# Patient Record
Sex: FEMALE | Race: WHITE | NOT HISPANIC OR LATINO | Employment: OTHER | URBAN - METROPOLITAN AREA
[De-identification: names, ages, dates, MRNs, and addresses within clinical notes are randomized per-mention and may not be internally consistent; named-entity substitution may affect disease eponyms.]

---

## 2017-01-15 RX ORDER — AZATHIOPRINE 50 MG/1
50 TABLET ORAL DAILY
Qty: 30 TABLET | Refills: 0 | Status: SHIPPED | OUTPATIENT
Start: 2017-01-15 | End: 2017-03-03 | Stop reason: SDUPTHER

## 2017-03-06 RX ORDER — AZATHIOPRINE 50 MG/1
TABLET ORAL
Qty: 30 TABLET | Refills: 0 | Status: SHIPPED | OUTPATIENT
Start: 2017-03-06 | End: 2017-04-24 | Stop reason: SDUPTHER

## 2017-04-24 RX ORDER — AZATHIOPRINE 50 MG/1
TABLET ORAL
Qty: 30 TABLET | Refills: 6 | Status: SHIPPED | OUTPATIENT
Start: 2017-04-24 | End: 2017-10-10 | Stop reason: SDUPTHER

## 2017-10-11 RX ORDER — AZATHIOPRINE 50 MG/1
TABLET ORAL
Qty: 30 TABLET | Refills: 0 | Status: SHIPPED | OUTPATIENT
Start: 2017-10-11 | End: 2018-02-06 | Stop reason: SDUPTHER

## 2017-10-20 ENCOUNTER — TELEPHONE (OUTPATIENT)
Dept: HEPATOLOGY | Facility: CLINIC | Age: 57
End: 2017-10-20

## 2017-10-20 NOTE — TELEPHONE ENCOUNTER
----- Message from Agatha Maharaj sent at 10/20/2017  3:44 PM CDT -----  Contact: PT  Please call patient regarding appt on 11-6-17, states labs and u/s need to be scheduled before appt.     Call

## 2017-10-20 NOTE — TELEPHONE ENCOUNTER
MA called patient back, she is former patient of ZELDA CARMICHAEL. She is scheduled to see Dr. Hankins. She is requesting to get labs and ultrasound done before she see Dr. Hankins.     Inform patient that we are going to ask for order once its put it we are going to call her back to schedule the test. Patient understood. YOSEPH

## 2017-10-23 DIAGNOSIS — K75.4 AUTOIMMUNE HEPATITIS: Primary | ICD-10-CM

## 2017-10-23 DIAGNOSIS — K74.60 CIRRHOSIS OF LIVER WITHOUT ASCITES, UNSPECIFIED HEPATIC CIRRHOSIS TYPE: ICD-10-CM

## 2017-11-06 ENCOUNTER — HOSPITAL ENCOUNTER (OUTPATIENT)
Dept: RADIOLOGY | Facility: HOSPITAL | Age: 57
Discharge: HOME | End: 2017-11-06
Attending: INTERNAL MEDICINE
Payer: MEDICARE

## 2017-11-06 ENCOUNTER — OFFICE VISIT (OUTPATIENT)
Dept: HEPATOLOGY | Facility: CLINIC | Age: 57
End: 2017-11-06
Payer: MEDICARE

## 2017-11-06 VITALS
DIASTOLIC BLOOD PRESSURE: 57 MMHG | TEMPERATURE: 98 F | HEART RATE: 83 BPM | HEIGHT: 65 IN | BODY MASS INDEX: 48.82 KG/M2 | OXYGEN SATURATION: 98 % | SYSTOLIC BLOOD PRESSURE: 119 MMHG | WEIGHT: 293 LBS | RESPIRATION RATE: 18 BRPM

## 2017-11-06 DIAGNOSIS — I85.11 SECONDARY ESOPHAGEAL VARICES WITH BLEEDING: ICD-10-CM

## 2017-11-06 DIAGNOSIS — K74.60 CIRRHOSIS OF LIVER WITHOUT ASCITES, UNSPECIFIED HEPATIC CIRRHOSIS TYPE: ICD-10-CM

## 2017-11-06 DIAGNOSIS — Z95.828 S/P TIPS (TRANSJUGULAR INTRAHEPATIC PORTOSYSTEMIC SHUNT): ICD-10-CM

## 2017-11-06 DIAGNOSIS — K75.4 AUTOIMMUNE HEPATITIS: ICD-10-CM

## 2017-11-06 DIAGNOSIS — K21.9 GASTROESOPHAGEAL REFLUX DISEASE WITHOUT ESOPHAGITIS: Primary | ICD-10-CM

## 2017-11-06 DIAGNOSIS — K74.69 OTHER CIRRHOSIS OF LIVER: ICD-10-CM

## 2017-11-06 PROCEDURE — 76700 US EXAM ABDOM COMPLETE: CPT | Mod: 26,GC,, | Performed by: RADIOLOGY

## 2017-11-06 PROCEDURE — 99215 OFFICE O/P EST HI 40 MIN: CPT | Mod: S$PBB,,, | Performed by: INTERNAL MEDICINE

## 2017-11-06 PROCEDURE — 76700 US EXAM ABDOM COMPLETE: CPT | Mod: TC

## 2017-11-06 PROCEDURE — 99214 OFFICE O/P EST MOD 30 MIN: CPT | Mod: PBBFAC,25 | Performed by: INTERNAL MEDICINE

## 2017-11-06 PROCEDURE — 99999 PR PBB SHADOW E&M-EST. PATIENT-LVL IV: CPT | Mod: PBBFAC,,, | Performed by: INTERNAL MEDICINE

## 2017-11-06 RX ORDER — PANTOPRAZOLE SODIUM 40 MG/1
40 TABLET, DELAYED RELEASE ORAL DAILY
Qty: 90 TABLET | Refills: 11 | Status: SHIPPED | OUTPATIENT
Start: 2017-11-06 | End: 2018-01-01 | Stop reason: SDUPTHER

## 2017-11-06 NOTE — PATIENT INSTRUCTIONS
1. Continue imuran at the current dose  2. Blood tests every 3 motnhs  3. Repeat US but with doppler in 6 months  4. Return in 4 months  5. Return sooner if having a lot of infections

## 2017-11-06 NOTE — LETTER
November 6, 2017      Iman Katz NP  [address]  [place] [address]           [facility] - Hepatology  [address]  [facility] [address]  Phone: [phone]  Fax: [phone]          Patient: Zaira Gupta   MR Number: [id number]   YOB: 1960   Date of Visit: 11/6/2017       Dear Iman Katz:    Thank you for referring Zaira Gupta to me for evaluation. Attached you will find relevant portions of my assessment and plan of care.    If you have questions, please do not hesitate to call me. I look forward to following Zaira Gupta along with you.    Sincerely,    Gricelda Hankins MD    Enclosure  CC:  No Recipients    If you would like to receive this communication electronically, please contact [email or web address] or [phone] to request more information on [employer] Link access.    For providers and/or their staff who would like to refer a patient to [facility], please contact us through our one-stop-shop provider referral line, [facility], at [phone].    If you feel you have received this communication in error or would no longer like to receive these types of communications, please e-mail [email or web address]

## 2017-11-06 NOTE — PROGRESS NOTES
HEPATOLOGY FOLLOW UP    Referring Physician: Minna Canchola NP  Current Corresponding Physician: Minna Canchola NP    Zaira Gupta is here for follow up of Autoimmune hepatitis      HPI   Zaira Gupta has a history of decompensated cirrhosis from autoimmune hepatitis and possibly NORIEGA. She had a gastric variceal bleed in February of 2012 and was treated at [facility] with a TIPS procedure and a subsequent revision of the shunt there. Ascites is controlled with TIPS/diuretics and salt restriction.    Her liver biopsy 04/12 done elsewhere was reviewed at [facility] and although there was no definitive evidence of autoimmune hepatitis, there was a lymphocytic infiltrate and her BRANT was strongly positive with a titer of 1 in 320. She has been on azathioprine 50 mg daily with improvement in her liver enzymes and it was recommended that she continue this medication.    US today- no mass lesions; 0.8 cm ?foregin body? TIPS not evaluated     She is overall feeling well. She has no N/V, abdo pain, constipation or diarrhea. She has had no recurrent infections. Her BMI is 53.71.    ALT is 27, AST 47, Tbil 2.1, ALKP 116, INR 1.1, plts 79, creat 1.3 and tbil 2.1, Na 138.    Outpatient Encounter Prescriptions as of 11/6/2017   Medication Sig Dispense Refill    azathioprine (IMURAN) 50 mg Tab TAKE 1 TABLET(50 MG) BY MOUTH EVERY DAY 30 tablet 0    betamethasone dipropionate (DIPROLENE) 0.05 % cream Apply topically 2 (two) times daily. 45 g 1    CHOLECALCIFEROL, VITAMIN D3, (VITAMIN D3 ORAL) Take 1,000 mg by mouth once daily.      furosemide (LASIX) 40 MG tablet Take 1 tablet (40 mg total) by mouth once daily. 30 tablet 11    levothyroxine (SYNTHROID) 125 MCG tablet Take 2 tablets (250 mcg total) by mouth before breakfast. 180 tablet 3    pantoprazole (PROTONIX) 40 MG tablet TAKE 1 TABLET BY MOUTH ONCE DAILY 90 tablet 0    rifAXIMin (XIFAXAN) 550 mg Tab Take 1 tablet (550 mg total) by mouth 2 (two) times daily. 60 tablet 6     spironolactone (ALDACTONE) 100 MG tablet Take 1 tablet (100 mg total) by mouth once daily. 30 tablet 11    clindamycin phosphate 1% (CLINDAGEL) 1 % gel Apply topically 2 (two) times daily. Apply to affected areas. (Patient taking differently: Apply topically 2 (two) times daily as needed. Apply to affected areas.) 60 g 3     No facility-administered encounter medications on file as of 11/6/2017.      Review of patient's allergies indicates:   Allergen Reactions    Aspirin (bulk) Other (See Comments)    Heparin analogues Other (See Comments)     Difficulty to arouse    Nsaids (non-steroidal anti-inflammatory drug)      Past Medical History:   Diagnosis Date    Acid reflux     Anemia     Arthritis     Autoimmune hepatitis 10/23/2012      Ref. Range 2/6/2012 09:38  BRANT Latest Range: Neg <1:160  Pos, Titer to follow (A)  BRANT HEP-2 Titer Latest Range: Neg <1:160 titer Pos 1:320 (A)  BRANT Hep-2 Pattern No range found Homogeneous (A)  Anti-SSA Antibody Latest Range: <20 EU 1.68  Anti-SSA Interpretation Latest Range: Negative  Negative  Anti-SSB Antibody Latest Range: <20 EU 1.76  Anti-SSB Interpretation Latest Range: Negative  Negativ    Cirrhosis     Dry mouth     Esophageal varix bleeding     Hypothyroidism     Obesity     Thrombocytopenia        Review of Systems   Constitutional: Negative.    HENT: Negative.    Eyes: Negative.    Respiratory: Negative.    Cardiovascular: Negative.    Gastrointestinal: Negative.    Genitourinary: Negative.    Musculoskeletal: Negative.    Skin: Negative.    Neurological: Negative.    Psychiatric/Behavioral: Negative.      Vitals:    11/06/17 1311   BP: (!) 119/57   Pulse: 83   Resp: 18   Temp: 97.6 °F (36.4 °C)       Physical Exam   Constitutional: She is oriented to person, place, and time. She appears well-developed and well-nourished.   HENT:   Head: Normocephalic and atraumatic.   Eyes: Conjunctivae and EOM are normal. Pupils are equal, round, and reactive to light.  No scleral icterus.   Neck: Normal range of motion. Neck supple. No thyromegaly present.   Cardiovascular: Normal rate, regular rhythm and normal heart sounds.    Pulmonary/Chest: Effort normal and breath sounds normal. She has no rales.   Abdominal: Soft. Bowel sounds are normal. She exhibits no distension and no mass. There is no tenderness.   Musculoskeletal: Normal range of motion. She exhibits no edema.   Neurological: She is alert and oriented to person, place, and time.   Skin: Skin is warm and dry. No rash noted.   Psychiatric: She has a normal mood and affect.   Vitals reviewed.      MELD-Na score: 13 at 11/6/2017 10:14 AM  MELD score: 13 at 11/6/2017 10:14 AM  Calculated from:  Serum Creatinine: 1.3 mg/dL at 11/6/2017 10:14 AM  Serum Sodium: 138 mmol/L (Rounded to 137) at 11/6/2017 10:14 AM  Total Bilirubin: 2.1 mg/dL at 11/6/2017 10:14 AM  INR(ratio): 1.1 at 11/6/2017 10:14 AM  Age: 57 years    Lab Results   Component Value Date    GLU 98 11/06/2017    BUN 15 11/06/2017    CREATININE 1.3 11/06/2017    CALCIUM 9.6 11/06/2017     11/06/2017    K 4.5 11/06/2017     11/06/2017    PROT 7.0 11/06/2017    CO2 29 11/06/2017    ANIONGAP 5 (L) 11/06/2017    WBC 2.48 (L) 11/06/2017    RBC 4.11 11/06/2017    HGB 13.3 11/06/2017    HCT 39.4 11/06/2017    MCV 96 11/06/2017    MCH 32.4 (H) 11/06/2017    MCHC 33.8 11/06/2017     Lab Results   Component Value Date    RDW 15.6 (H) 11/06/2017    PLT 79 (L) 11/06/2017    MPV 10.1 11/06/2017    GRAN 1.7 (L) 11/06/2017    GRAN 67.3 11/06/2017    LYMPH 0.5 (L) 11/06/2017    LYMPH 19.4 11/06/2017    MONO 0.2 (L) 11/06/2017    MONO 7.7 11/06/2017    EOSINOPHIL 3.6 11/06/2017    BASOPHIL 1.6 11/06/2017    EOS 0.1 11/06/2017    BASO 0.04 11/06/2017    APTT 34.1 (H) 08/25/2014    GROUPTRH A POS 11/15/2013    GROUPTRH A POS 02/06/2012    BRANT Pos, Titer to follow (A) 02/06/2012    CHOL 218 (H) 04/07/2015    TRIG 128 04/07/2015    HDL 31 (L) 04/07/2015    CHOLHDL 14.2 (L)  04/07/2015    TOTALCHOLEST 7.0 (H) 04/07/2015    ALBUMIN 3.0 (L) 11/06/2017    BILIDIR 0.6 (H) 10/03/2013    AST 47 (H) 11/06/2017    ALT 27 11/06/2017    ALKPHOS 116 11/06/2017    LABPROT 11.4 11/06/2017    INR 1.1 11/06/2017     MELD-Na score: 13 at 11/6/2017 10:14 AM  MELD score: 13 at 11/6/2017 10:14 AM  Calculated from:  Serum Creatinine: 1.3 mg/dL at 11/6/2017 10:14 AM  Serum Sodium: 138 mmol/L (Rounded to 137) at 11/6/2017 10:14 AM  Total Bilirubin: 2.1 mg/dL at 11/6/2017 10:14 AM  INR(ratio): 1.1 at 11/6/2017 10:14 AM  Age: 57 years    Assessment and Plan:    Zaira Gupta is a 57 y.o. female with cirrhosis from presumed Autoimmune hepatitis  My current recommendations:  1. Decompensated cirrhosis: MELD <15. Monitor meld labs every 3 months. I suspect that although autoimmune hepatitis was suspected, there is likely also a component of NORIEGA since her AST>ALT. Pt should have repeat HCC screening in 6 months (May 2018).  2. AScites, s/p TIPS; continue current diuretics; check tips patency with next US  3. Gastric varices: s/p TIPS; monitor  4. Autoimmune hepatitis: continue imuran; if recurrent infections develop then consider stopping imuran.  Return 4 months

## 2018-01-01 ENCOUNTER — PATIENT MESSAGE (OUTPATIENT)
Dept: HEPATOLOGY | Facility: CLINIC | Age: 58
End: 2018-01-01

## 2018-01-01 DIAGNOSIS — K21.9 GASTROESOPHAGEAL REFLUX DISEASE WITHOUT ESOPHAGITIS: ICD-10-CM

## 2018-01-01 RX ORDER — PANTOPRAZOLE SODIUM 40 MG/1
TABLET, DELAYED RELEASE ORAL
Qty: 90 TABLET | Refills: 0 | Status: SHIPPED | OUTPATIENT
Start: 2018-01-01 | End: 2019-01-01 | Stop reason: SDUPTHER

## 2018-01-01 RX ORDER — AZATHIOPRINE 50 MG/1
TABLET ORAL
Qty: 30 TABLET | Refills: 0 | Status: SHIPPED | OUTPATIENT
Start: 2018-01-01 | End: 2018-01-01 | Stop reason: SDUPTHER

## 2018-01-01 RX ORDER — RIFAXIMIN 550 MG/1
TABLET ORAL
Qty: 60 TABLET | Refills: 0 | Status: SHIPPED | OUTPATIENT
Start: 2018-01-01 | End: 2019-01-01 | Stop reason: SDUPTHER

## 2018-01-01 RX ORDER — AZATHIOPRINE 50 MG/1
TABLET ORAL
Qty: 30 TABLET | Refills: 0 | Status: SHIPPED | OUTPATIENT
Start: 2018-01-01 | End: 2019-01-01 | Stop reason: SDUPTHER

## 2018-01-01 RX ORDER — RIFAXIMIN 550 MG/1
TABLET ORAL
Qty: 60 TABLET | Refills: 0 | Status: SHIPPED | OUTPATIENT
Start: 2018-01-01 | End: 2018-01-01 | Stop reason: SDUPTHER

## 2018-01-23 RX ORDER — RIFAXIMIN 550 MG/1
TABLET ORAL
Qty: 60 TABLET | Refills: 0 | Status: SHIPPED | OUTPATIENT
Start: 2018-01-23 | End: 2018-02-28 | Stop reason: SDUPTHER

## 2018-02-06 RX ORDER — AZATHIOPRINE 50 MG/1
TABLET ORAL
Qty: 30 TABLET | Refills: 0 | Status: SHIPPED | OUTPATIENT
Start: 2018-02-06 | End: 2018-03-04 | Stop reason: SDUPTHER

## 2018-02-07 NOTE — TELEPHONE ENCOUNTER
----- Message from Gricelda Hankins MD sent at 2/6/2018  8:28 PM CST -----  The Labs are stable - please let patient know.

## 2018-02-28 RX ORDER — RIFAXIMIN 550 MG/1
TABLET ORAL
Qty: 60 TABLET | Refills: 0 | Status: SHIPPED | OUTPATIENT
Start: 2018-02-28 | End: 2018-04-03 | Stop reason: SDUPTHER

## 2018-03-04 RX ORDER — AZATHIOPRINE 50 MG/1
TABLET ORAL
Qty: 30 TABLET | Refills: 0 | Status: SHIPPED | OUTPATIENT
Start: 2018-03-04 | End: 2018-04-07 | Stop reason: SDUPTHER

## 2018-04-03 RX ORDER — RIFAXIMIN 550 MG/1
TABLET ORAL
Qty: 60 TABLET | Refills: 0 | Status: SHIPPED | OUTPATIENT
Start: 2018-04-03 | End: 2018-04-07 | Stop reason: SDUPTHER

## 2018-04-08 RX ORDER — RIFAXIMIN 550 MG/1
TABLET ORAL
Qty: 60 TABLET | Refills: 0 | Status: SHIPPED | OUTPATIENT
Start: 2018-04-08 | End: 2018-04-11 | Stop reason: SDUPTHER

## 2018-04-08 RX ORDER — AZATHIOPRINE 50 MG/1
TABLET ORAL
Qty: 30 TABLET | Refills: 0 | Status: SHIPPED | OUTPATIENT
Start: 2018-04-08 | End: 2018-06-10 | Stop reason: SDUPTHER

## 2018-05-23 PROBLEM — K05.6 PERIODONTAL DISEASE: Status: ACTIVE | Noted: 2018-05-23

## 2018-05-23 PROBLEM — K02.9 DENTAL CARIES: Status: ACTIVE | Noted: 2018-05-23

## 2018-05-23 PROBLEM — K09.0 ODONTOGENIC CYST: Status: ACTIVE | Noted: 2018-05-23

## 2018-06-10 RX ORDER — AZATHIOPRINE 50 MG/1
TABLET ORAL
Qty: 30 TABLET | Refills: 0 | Status: SHIPPED | OUTPATIENT
Start: 2018-06-10 | End: 2018-07-24 | Stop reason: SDUPTHER

## 2018-07-24 RX ORDER — AZATHIOPRINE 50 MG/1
TABLET ORAL
Qty: 30 TABLET | Refills: 0 | Status: SHIPPED | OUTPATIENT
Start: 2018-07-24 | End: 2018-08-24 | Stop reason: SDUPTHER

## 2018-07-24 RX ORDER — RIFAXIMIN 550 MG/1
TABLET ORAL
Qty: 60 TABLET | Refills: 0 | Status: SHIPPED | OUTPATIENT
Start: 2018-07-24 | End: 2018-07-30 | Stop reason: SDUPTHER

## 2018-08-07 ENCOUNTER — TELEPHONE (OUTPATIENT)
Dept: HEPATOLOGY | Facility: CLINIC | Age: 58
End: 2018-08-07

## 2018-08-07 NOTE — TELEPHONE ENCOUNTER
----- Message from Gricelda Hankins MD sent at 8/7/2018  7:33 AM CDT -----  The Labs are stable - please let patient know.

## 2018-08-24 RX ORDER — RIFAXIMIN 550 MG/1
TABLET ORAL
Qty: 60 TABLET | Refills: 0 | Status: SHIPPED | OUTPATIENT
Start: 2018-08-24 | End: 2018-09-26 | Stop reason: SDUPTHER

## 2018-08-24 RX ORDER — AZATHIOPRINE 50 MG/1
TABLET ORAL
Qty: 30 TABLET | Refills: 0 | Status: SHIPPED | OUTPATIENT
Start: 2018-08-24 | End: 2018-09-26 | Stop reason: SDUPTHER

## 2018-09-26 RX ORDER — RIFAXIMIN 550 MG/1
TABLET ORAL
Qty: 60 TABLET | Refills: 0 | Status: SHIPPED | OUTPATIENT
Start: 2018-09-26 | End: 2018-01-01 | Stop reason: SDUPTHER

## 2018-09-26 RX ORDER — AZATHIOPRINE 50 MG/1
TABLET ORAL
Qty: 30 TABLET | Refills: 0 | Status: SHIPPED | OUTPATIENT
Start: 2018-09-26 | End: 2018-01-01 | Stop reason: SDUPTHER

## 2019-01-01 ENCOUNTER — TELEPHONE (OUTPATIENT)
Dept: HEPATOLOGY | Facility: CLINIC | Age: 59
End: 2019-01-01

## 2019-01-01 ENCOUNTER — HOSPITAL ENCOUNTER (INPATIENT)
Facility: HOSPITAL | Age: 59
LOS: 2 days | DRG: 951 | End: 2019-09-29
Attending: INTERNAL MEDICINE | Admitting: HOSPITALIST
Payer: OTHER MISCELLANEOUS

## 2019-01-01 ENCOUNTER — ANESTHESIA EVENT (OUTPATIENT)
Dept: SURGERY | Facility: HOSPITAL | Age: 59
DRG: 820 | End: 2019-01-01
Payer: MEDICARE

## 2019-01-01 ENCOUNTER — ANESTHESIA (OUTPATIENT)
Dept: SURGERY | Facility: HOSPITAL | Age: 59
DRG: 820 | End: 2019-01-01
Payer: MEDICARE

## 2019-01-01 ENCOUNTER — HOSPITAL ENCOUNTER (INPATIENT)
Facility: HOSPITAL | Age: 59
LOS: 14 days | Discharge: HOSPICE | DRG: 820 | End: 2019-09-27
Attending: SURGERY | Admitting: SURGERY
Payer: MEDICARE

## 2019-01-01 ENCOUNTER — ANESTHESIA EVENT (OUTPATIENT)
Dept: ENDOSCOPY | Facility: HOSPITAL | Age: 59
DRG: 820 | End: 2019-01-01
Payer: MEDICARE

## 2019-01-01 ENCOUNTER — ANESTHESIA (OUTPATIENT)
Dept: ENDOSCOPY | Facility: HOSPITAL | Age: 59
DRG: 820 | End: 2019-01-01
Payer: MEDICARE

## 2019-01-01 VITALS
OXYGEN SATURATION: 100 % | WEIGHT: 293 LBS | HEART RATE: 105 BPM | HEIGHT: 65 IN | BODY MASS INDEX: 48.82 KG/M2 | DIASTOLIC BLOOD PRESSURE: 57 MMHG | SYSTOLIC BLOOD PRESSURE: 116 MMHG | TEMPERATURE: 97 F | RESPIRATION RATE: 20 BRPM

## 2019-01-01 VITALS
TEMPERATURE: 96 F | OXYGEN SATURATION: 98 % | DIASTOLIC BLOOD PRESSURE: 40 MMHG | RESPIRATION RATE: 16 BRPM | HEART RATE: 98 BPM | SYSTOLIC BLOOD PRESSURE: 92 MMHG

## 2019-01-01 DIAGNOSIS — K76.82 HEPATIC ENCEPHALOPATHY: ICD-10-CM

## 2019-01-01 DIAGNOSIS — K75.4 AUTOIMMUNE HEPATITIS: ICD-10-CM

## 2019-01-01 DIAGNOSIS — K63.1 PERFORATION OF INTESTINE: Primary | ICD-10-CM

## 2019-01-01 DIAGNOSIS — Z71.89 GOALS OF CARE, COUNSELING/DISCUSSION: ICD-10-CM

## 2019-01-01 DIAGNOSIS — K63.1 SMALL BOWEL PERFORATION: ICD-10-CM

## 2019-01-01 DIAGNOSIS — K21.9 GASTROESOPHAGEAL REFLUX DISEASE WITHOUT ESOPHAGITIS: ICD-10-CM

## 2019-01-01 DIAGNOSIS — N18.30 CKD (CHRONIC KIDNEY DISEASE), STAGE III: ICD-10-CM

## 2019-01-01 DIAGNOSIS — C83.30 DIFFUSE LARGE B CELL LYMPHOMA: ICD-10-CM

## 2019-01-01 DIAGNOSIS — K75.4 AUTOIMMUNE HEPATITIS: Primary | ICD-10-CM

## 2019-01-01 DIAGNOSIS — Z71.89 ADVANCED CARE PLANNING/COUNSELING DISCUSSION: ICD-10-CM

## 2019-01-01 DIAGNOSIS — K92.2 ACUTE GI BLEEDING: ICD-10-CM

## 2019-01-01 DIAGNOSIS — K25.5 GASTRIC PERFORATION: ICD-10-CM

## 2019-01-01 DIAGNOSIS — Z51.5 PALLIATIVE CARE ENCOUNTER: ICD-10-CM

## 2019-01-01 DIAGNOSIS — K76.82 HEPATIC ENCEPHALOPATHY: Primary | ICD-10-CM

## 2019-01-01 DIAGNOSIS — F05 ACUTE CONFUSIONAL STATE: ICD-10-CM

## 2019-01-01 LAB
ABO + RH BLD: NORMAL
ABO + RH BLD: NORMAL
ALBUMIN SERPL BCP-MCNC: 1.4 G/DL (ref 3.5–5.2)
ALBUMIN SERPL BCP-MCNC: 1.5 G/DL (ref 3.5–5.2)
ALBUMIN SERPL BCP-MCNC: 1.6 G/DL (ref 3.5–5.2)
ALBUMIN SERPL BCP-MCNC: 1.7 G/DL (ref 3.5–5.2)
ALBUMIN SERPL BCP-MCNC: 1.8 G/DL (ref 3.5–5.2)
ALBUMIN SERPL BCP-MCNC: 2 G/DL (ref 3.5–5.2)
ALBUMIN SERPL BCP-MCNC: 2.1 G/DL (ref 3.5–5.2)
ALLENS TEST: ABNORMAL
ALLENS TEST: NORMAL
ALLENS TEST: NORMAL
ALP SERPL-CCNC: 105 U/L (ref 55–135)
ALP SERPL-CCNC: 77 U/L (ref 55–135)
ALP SERPL-CCNC: 78 U/L (ref 55–135)
ALP SERPL-CCNC: 80 U/L (ref 55–135)
ALP SERPL-CCNC: 81 U/L (ref 55–135)
ALP SERPL-CCNC: 82 U/L (ref 55–135)
ALP SERPL-CCNC: 82 U/L (ref 55–135)
ALP SERPL-CCNC: 83 U/L (ref 55–135)
ALP SERPL-CCNC: 88 U/L (ref 55–135)
ALP SERPL-CCNC: 89 U/L (ref 55–135)
ALP SERPL-CCNC: 90 U/L (ref 55–135)
ALP SERPL-CCNC: 91 U/L (ref 55–135)
ALP SERPL-CCNC: 92 U/L (ref 55–135)
ALP SERPL-CCNC: 92 U/L (ref 55–135)
ALP SERPL-CCNC: 94 U/L (ref 55–135)
ALP SERPL-CCNC: 95 U/L (ref 55–135)
ALT SERPL W/O P-5'-P-CCNC: 19 U/L (ref 10–44)
ALT SERPL W/O P-5'-P-CCNC: 21 U/L (ref 10–44)
ALT SERPL W/O P-5'-P-CCNC: 22 U/L (ref 10–44)
ALT SERPL W/O P-5'-P-CCNC: 24 U/L (ref 10–44)
ALT SERPL W/O P-5'-P-CCNC: 29 U/L (ref 10–44)
ALT SERPL W/O P-5'-P-CCNC: 30 U/L (ref 10–44)
ALT SERPL W/O P-5'-P-CCNC: 31 U/L (ref 10–44)
ALT SERPL W/O P-5'-P-CCNC: 32 U/L (ref 10–44)
ALT SERPL W/O P-5'-P-CCNC: 33 U/L (ref 10–44)
ALT SERPL W/O P-5'-P-CCNC: 34 U/L (ref 10–44)
ALT SERPL W/O P-5'-P-CCNC: 35 U/L (ref 10–44)
ALT SERPL W/O P-5'-P-CCNC: 36 U/L (ref 10–44)
ALT SERPL W/O P-5'-P-CCNC: 37 U/L (ref 10–44)
ALT SERPL W/O P-5'-P-CCNC: 39 U/L (ref 10–44)
ALT SERPL W/O P-5'-P-CCNC: 41 U/L (ref 10–44)
ALT SERPL W/O P-5'-P-CCNC: 41 U/L (ref 10–44)
AMMONIA PLAS-SCNC: 117 UMOL/L (ref 10–50)
AMMONIA PLAS-SCNC: 124 UMOL/L (ref 10–50)
AMMONIA PLAS-SCNC: 40 UMOL/L (ref 10–50)
AMMONIA PLAS-SCNC: 40 UMOL/L (ref 10–50)
AMMONIA PLAS-SCNC: 45 UMOL/L (ref 10–50)
AMMONIA PLAS-SCNC: 53 UMOL/L (ref 10–50)
AMMONIA PLAS-SCNC: 67 UMOL/L (ref 10–50)
AMMONIA PLAS-SCNC: 70 UMOL/L (ref 10–50)
AMMONIA PLAS-SCNC: 72 UMOL/L (ref 10–50)
AMMONIA PLAS-SCNC: 74 UMOL/L (ref 10–50)
AMMONIA PLAS-SCNC: 76 UMOL/L (ref 10–50)
AMMONIA PLAS-SCNC: 90 UMOL/L (ref 10–50)
AMMONIA PLAS-SCNC: 99 UMOL/L (ref 10–50)
ANION GAP SERPL CALC-SCNC: 11 MMOL/L (ref 8–16)
ANION GAP SERPL CALC-SCNC: 12 MMOL/L (ref 8–16)
ANION GAP SERPL CALC-SCNC: 13 MMOL/L (ref 8–16)
ANION GAP SERPL CALC-SCNC: 14 MMOL/L (ref 8–16)
ANION GAP SERPL CALC-SCNC: 4 MMOL/L (ref 8–16)
ANION GAP SERPL CALC-SCNC: 5 MMOL/L (ref 8–16)
ANION GAP SERPL CALC-SCNC: 6 MMOL/L (ref 8–16)
ANION GAP SERPL CALC-SCNC: 7 MMOL/L (ref 8–16)
ANION GAP SERPL CALC-SCNC: 8 MMOL/L (ref 8–16)
ANION GAP SERPL CALC-SCNC: 8 MMOL/L (ref 8–16)
ANION GAP SERPL CALC-SCNC: 9 MMOL/L (ref 8–16)
ANISOCYTOSIS BLD QL SMEAR: ABNORMAL
ANISOCYTOSIS BLD QL SMEAR: SLIGHT
APTT BLDCRRT: 37 SEC (ref 21–32)
APTT BLDCRRT: 37.8 SEC (ref 21–32)
APTT BLDCRRT: 39.1 SEC (ref 21–32)
APTT BLDCRRT: 40.4 SEC (ref 21–32)
APTT BLDCRRT: 40.7 SEC (ref 21–32)
APTT BLDCRRT: 41.2 SEC (ref 21–32)
APTT BLDCRRT: 41.7 SEC (ref 21–32)
APTT BLDCRRT: 41.8 SEC (ref 21–32)
APTT BLDCRRT: 42.1 SEC (ref 21–32)
APTT BLDCRRT: 43.3 SEC (ref 21–32)
APTT BLDCRRT: 43.5 SEC (ref 21–32)
APTT BLDCRRT: 43.7 SEC (ref 21–32)
APTT BLDCRRT: 46.3 SEC (ref 21–32)
APTT BLDCRRT: 48 SEC (ref 21–32)
APTT BLDCRRT: 53.6 SEC (ref 21–32)
AST SERPL-CCNC: 100 U/L (ref 10–40)
AST SERPL-CCNC: 100 U/L (ref 10–40)
AST SERPL-CCNC: 111 U/L (ref 10–40)
AST SERPL-CCNC: 111 U/L (ref 10–40)
AST SERPL-CCNC: 113 U/L (ref 10–40)
AST SERPL-CCNC: 45 U/L (ref 10–40)
AST SERPL-CCNC: 51 U/L (ref 10–40)
AST SERPL-CCNC: 61 U/L (ref 10–40)
AST SERPL-CCNC: 61 U/L (ref 10–40)
AST SERPL-CCNC: 70 U/L (ref 10–40)
AST SERPL-CCNC: 77 U/L (ref 10–40)
AST SERPL-CCNC: 78 U/L (ref 10–40)
AST SERPL-CCNC: 79 U/L (ref 10–40)
AST SERPL-CCNC: 81 U/L (ref 10–40)
AST SERPL-CCNC: 85 U/L (ref 10–40)
AST SERPL-CCNC: 85 U/L (ref 10–40)
AST SERPL-CCNC: 90 U/L (ref 10–40)
AST SERPL-CCNC: 95 U/L (ref 10–40)
BASO STIPL BLD QL SMEAR: ABNORMAL
BASOPHILS # BLD AUTO: 0 K/UL (ref 0–0.2)
BASOPHILS # BLD AUTO: 0.01 K/UL (ref 0–0.2)
BASOPHILS # BLD AUTO: 0.02 K/UL (ref 0–0.2)
BASOPHILS # BLD AUTO: 0.02 K/UL (ref 0–0.2)
BASOPHILS NFR BLD: 0 % (ref 0–1.9)
BASOPHILS NFR BLD: 0.1 % (ref 0–1.9)
BASOPHILS NFR BLD: 0.2 % (ref 0–1.9)
BASOPHILS NFR BLD: 0.3 % (ref 0–1.9)
BASOPHILS NFR BLD: 0.5 % (ref 0–1.9)
BILIRUB SERPL-MCNC: 3.9 MG/DL (ref 0.1–1)
BILIRUB SERPL-MCNC: 3.9 MG/DL (ref 0.1–1)
BILIRUB SERPL-MCNC: 4.1 MG/DL (ref 0.1–1)
BILIRUB SERPL-MCNC: 4.2 MG/DL (ref 0.1–1)
BILIRUB SERPL-MCNC: 4.3 MG/DL (ref 0.1–1)
BILIRUB SERPL-MCNC: 4.5 MG/DL (ref 0.1–1)
BILIRUB SERPL-MCNC: 4.8 MG/DL (ref 0.1–1)
BILIRUB SERPL-MCNC: 4.9 MG/DL (ref 0.1–1)
BILIRUB SERPL-MCNC: 5.1 MG/DL (ref 0.1–1)
BILIRUB SERPL-MCNC: 5.4 MG/DL (ref 0.1–1)
BILIRUB SERPL-MCNC: 5.5 MG/DL (ref 0.1–1)
BILIRUB SERPL-MCNC: 5.6 MG/DL (ref 0.1–1)
BILIRUB SERPL-MCNC: 5.8 MG/DL (ref 0.1–1)
BILIRUB SERPL-MCNC: 6 MG/DL (ref 0.1–1)
BILIRUB SERPL-MCNC: 6.6 MG/DL (ref 0.1–1)
BILIRUB SERPL-MCNC: 6.8 MG/DL (ref 0.1–1)
BLD GP AB SCN CELLS X3 SERPL QL: NORMAL
BLD GP AB SCN CELLS X3 SERPL QL: NORMAL
BLD PROD TYP BPU: NORMAL
BLOOD UNIT EXPIRATION DATE: NORMAL
BLOOD UNIT TYPE CODE: 600
BLOOD UNIT TYPE CODE: 6200
BLOOD UNIT TYPE CODE: NORMAL
BLOOD UNIT TYPE CODE: NORMAL
BLOOD UNIT TYPE: NORMAL
BUN SERPL-MCNC: 22 MG/DL (ref 6–20)
BUN SERPL-MCNC: 23 MG/DL (ref 6–20)
BUN SERPL-MCNC: 27 MG/DL (ref 6–20)
BUN SERPL-MCNC: 38 MG/DL (ref 6–20)
BUN SERPL-MCNC: 42 MG/DL (ref 6–20)
BUN SERPL-MCNC: 44 MG/DL (ref 6–20)
BUN SERPL-MCNC: 44 MG/DL (ref 6–20)
BUN SERPL-MCNC: 45 MG/DL (ref 6–20)
BUN SERPL-MCNC: 46 MG/DL (ref 6–20)
BUN SERPL-MCNC: 48 MG/DL (ref 6–20)
BUN SERPL-MCNC: 48 MG/DL (ref 6–20)
BUN SERPL-MCNC: 50 MG/DL (ref 6–20)
BUN SERPL-MCNC: 50 MG/DL (ref 6–20)
BUN SERPL-MCNC: 51 MG/DL (ref 6–20)
BUN SERPL-MCNC: 52 MG/DL (ref 6–20)
BUN SERPL-MCNC: 53 MG/DL (ref 6–20)
BUN SERPL-MCNC: 62 MG/DL (ref 6–20)
BUN SERPL-MCNC: 81 MG/DL (ref 6–20)
BUN SERPL-MCNC: 84 MG/DL (ref 6–20)
BUN SERPL-MCNC: 87 MG/DL (ref 6–20)
BURR CELLS BLD QL SMEAR: ABNORMAL
BURR CELLS BLD QL SMEAR: ABNORMAL
CALCIUM SERPL-MCNC: 7.3 MG/DL (ref 8.7–10.5)
CALCIUM SERPL-MCNC: 7.4 MG/DL (ref 8.7–10.5)
CALCIUM SERPL-MCNC: 7.4 MG/DL (ref 8.7–10.5)
CALCIUM SERPL-MCNC: 7.6 MG/DL (ref 8.7–10.5)
CALCIUM SERPL-MCNC: 8 MG/DL (ref 8.7–10.5)
CALCIUM SERPL-MCNC: 8 MG/DL (ref 8.7–10.5)
CALCIUM SERPL-MCNC: 8.1 MG/DL (ref 8.7–10.5)
CALCIUM SERPL-MCNC: 8.2 MG/DL (ref 8.7–10.5)
CALCIUM SERPL-MCNC: 8.4 MG/DL (ref 8.7–10.5)
CALCIUM SERPL-MCNC: 8.4 MG/DL (ref 8.7–10.5)
CALCIUM SERPL-MCNC: 8.7 MG/DL (ref 8.7–10.5)
CALCIUM SERPL-MCNC: 8.9 MG/DL (ref 8.7–10.5)
CALCIUM SERPL-MCNC: 8.9 MG/DL (ref 8.7–10.5)
CALCIUM SERPL-MCNC: 9 MG/DL (ref 8.7–10.5)
CALCIUM SERPL-MCNC: 9.1 MG/DL (ref 8.7–10.5)
CALCIUM SERPL-MCNC: 9.4 MG/DL (ref 8.7–10.5)
CALCIUM SERPL-MCNC: 9.5 MG/DL (ref 8.7–10.5)
CALCIUM SERPL-MCNC: 9.7 MG/DL (ref 8.7–10.5)
CHLORIDE SERPL-SCNC: 108 MMOL/L (ref 95–110)
CHLORIDE SERPL-SCNC: 109 MMOL/L (ref 95–110)
CHLORIDE SERPL-SCNC: 109 MMOL/L (ref 95–110)
CHLORIDE SERPL-SCNC: 110 MMOL/L (ref 95–110)
CHLORIDE SERPL-SCNC: 110 MMOL/L (ref 95–110)
CHLORIDE SERPL-SCNC: 111 MMOL/L (ref 95–110)
CHLORIDE SERPL-SCNC: 111 MMOL/L (ref 95–110)
CHLORIDE SERPL-SCNC: 112 MMOL/L (ref 95–110)
CHLORIDE SERPL-SCNC: 113 MMOL/L (ref 95–110)
CHLORIDE SERPL-SCNC: 113 MMOL/L (ref 95–110)
CHLORIDE SERPL-SCNC: 114 MMOL/L (ref 95–110)
CHLORIDE SERPL-SCNC: 114 MMOL/L (ref 95–110)
CHLORIDE SERPL-SCNC: 115 MMOL/L (ref 95–110)
CHLORIDE SERPL-SCNC: 117 MMOL/L (ref 95–110)
CHLORIDE SERPL-SCNC: 118 MMOL/L (ref 95–110)
CO2 SERPL-SCNC: 14 MMOL/L (ref 23–29)
CO2 SERPL-SCNC: 15 MMOL/L (ref 23–29)
CO2 SERPL-SCNC: 16 MMOL/L (ref 23–29)
CO2 SERPL-SCNC: 17 MMOL/L (ref 23–29)
CO2 SERPL-SCNC: 18 MMOL/L (ref 23–29)
CO2 SERPL-SCNC: 20 MMOL/L (ref 23–29)
CO2 SERPL-SCNC: 21 MMOL/L (ref 23–29)
CO2 SERPL-SCNC: 23 MMOL/L (ref 23–29)
CO2 SERPL-SCNC: 23 MMOL/L (ref 23–29)
CO2 SERPL-SCNC: 24 MMOL/L (ref 23–29)
CO2 SERPL-SCNC: 24 MMOL/L (ref 23–29)
CO2 SERPL-SCNC: 25 MMOL/L (ref 23–29)
CO2 SERPL-SCNC: 26 MMOL/L (ref 23–29)
CO2 SERPL-SCNC: 27 MMOL/L (ref 23–29)
CO2 SERPL-SCNC: 27 MMOL/L (ref 23–29)
CODING SYSTEM: NORMAL
CREAT SERPL-MCNC: 0.9 MG/DL (ref 0.5–1.4)
CREAT SERPL-MCNC: 0.9 MG/DL (ref 0.5–1.4)
CREAT SERPL-MCNC: 1 MG/DL (ref 0.5–1.4)
CREAT SERPL-MCNC: 1.1 MG/DL (ref 0.5–1.4)
CREAT SERPL-MCNC: 1.2 MG/DL (ref 0.5–1.4)
CREAT SERPL-MCNC: 1.3 MG/DL (ref 0.5–1.4)
CREAT SERPL-MCNC: 1.4 MG/DL (ref 0.5–1.4)
CREAT SERPL-MCNC: 1.4 MG/DL (ref 0.5–1.4)
CREAT SERPL-MCNC: 1.5 MG/DL (ref 0.5–1.4)
CREAT SERPL-MCNC: 1.6 MG/DL (ref 0.5–1.4)
CREAT SERPL-MCNC: 1.7 MG/DL (ref 0.5–1.4)
CREAT SERPL-MCNC: 2 MG/DL (ref 0.5–1.4)
CREAT SERPL-MCNC: 2 MG/DL (ref 0.5–1.4)
CREAT SERPL-MCNC: 2.1 MG/DL (ref 0.5–1.4)
DELSYS: ABNORMAL
DELSYS: NORMAL
DELSYS: NORMAL
DIFFERENTIAL METHOD: ABNORMAL
DISPENSE STATUS: NORMAL
DOHLE BOD BLD QL SMEAR: PRESENT
EOSINOPHIL # BLD AUTO: 0 K/UL (ref 0–0.5)
EOSINOPHIL # BLD AUTO: 0.1 K/UL (ref 0–0.5)
EOSINOPHIL NFR BLD: 0 % (ref 0–8)
EOSINOPHIL NFR BLD: 0.1 % (ref 0–8)
EOSINOPHIL NFR BLD: 0.2 % (ref 0–8)
EOSINOPHIL NFR BLD: 0.3 % (ref 0–8)
EOSINOPHIL NFR BLD: 0.4 % (ref 0–8)
EOSINOPHIL NFR BLD: 0.5 % (ref 0–8)
EOSINOPHIL NFR BLD: 0.7 % (ref 0–8)
EOSINOPHIL NFR BLD: 0.8 % (ref 0–8)
EOSINOPHIL NFR BLD: 0.9 % (ref 0–8)
EOSINOPHIL NFR BLD: 1 % (ref 0–8)
EOSINOPHIL NFR BLD: 1.1 % (ref 0–8)
EOSINOPHIL NFR BLD: 1.3 % (ref 0–8)
ERYTHROCYTE [DISTWIDTH] IN BLOOD BY AUTOMATED COUNT: 19.3 % (ref 11.5–14.5)
ERYTHROCYTE [DISTWIDTH] IN BLOOD BY AUTOMATED COUNT: 19.7 % (ref 11.5–14.5)
ERYTHROCYTE [DISTWIDTH] IN BLOOD BY AUTOMATED COUNT: 19.8 % (ref 11.5–14.5)
ERYTHROCYTE [DISTWIDTH] IN BLOOD BY AUTOMATED COUNT: 19.8 % (ref 11.5–14.5)
ERYTHROCYTE [DISTWIDTH] IN BLOOD BY AUTOMATED COUNT: 19.9 % (ref 11.5–14.5)
ERYTHROCYTE [DISTWIDTH] IN BLOOD BY AUTOMATED COUNT: 20.1 % (ref 11.5–14.5)
ERYTHROCYTE [DISTWIDTH] IN BLOOD BY AUTOMATED COUNT: 20.1 % (ref 11.5–14.5)
ERYTHROCYTE [DISTWIDTH] IN BLOOD BY AUTOMATED COUNT: 20.7 % (ref 11.5–14.5)
ERYTHROCYTE [DISTWIDTH] IN BLOOD BY AUTOMATED COUNT: 21.2 % (ref 11.5–14.5)
ERYTHROCYTE [DISTWIDTH] IN BLOOD BY AUTOMATED COUNT: 21.5 % (ref 11.5–14.5)
ERYTHROCYTE [DISTWIDTH] IN BLOOD BY AUTOMATED COUNT: 21.8 % (ref 11.5–14.5)
ERYTHROCYTE [DISTWIDTH] IN BLOOD BY AUTOMATED COUNT: 22.4 % (ref 11.5–14.5)
ERYTHROCYTE [DISTWIDTH] IN BLOOD BY AUTOMATED COUNT: 22.7 % (ref 11.5–14.5)
ERYTHROCYTE [DISTWIDTH] IN BLOOD BY AUTOMATED COUNT: 22.8 % (ref 11.5–14.5)
ERYTHROCYTE [DISTWIDTH] IN BLOOD BY AUTOMATED COUNT: 23.4 % (ref 11.5–14.5)
ERYTHROCYTE [DISTWIDTH] IN BLOOD BY AUTOMATED COUNT: 23.7 % (ref 11.5–14.5)
ERYTHROCYTE [DISTWIDTH] IN BLOOD BY AUTOMATED COUNT: 23.8 % (ref 11.5–14.5)
ERYTHROCYTE [DISTWIDTH] IN BLOOD BY AUTOMATED COUNT: 23.9 % (ref 11.5–14.5)
ERYTHROCYTE [DISTWIDTH] IN BLOOD BY AUTOMATED COUNT: 23.9 % (ref 11.5–14.5)
ERYTHROCYTE [DISTWIDTH] IN BLOOD BY AUTOMATED COUNT: 24.1 % (ref 11.5–14.5)
ERYTHROCYTE [DISTWIDTH] IN BLOOD BY AUTOMATED COUNT: 24.2 % (ref 11.5–14.5)
ERYTHROCYTE [DISTWIDTH] IN BLOOD BY AUTOMATED COUNT: 24.2 % (ref 11.5–14.5)
ERYTHROCYTE [DISTWIDTH] IN BLOOD BY AUTOMATED COUNT: 24.7 % (ref 11.5–14.5)
ERYTHROCYTE [DISTWIDTH] IN BLOOD BY AUTOMATED COUNT: 24.9 % (ref 11.5–14.5)
ERYTHROCYTE [DISTWIDTH] IN BLOOD BY AUTOMATED COUNT: 24.9 % (ref 11.5–14.5)
ERYTHROCYTE [DISTWIDTH] IN BLOOD BY AUTOMATED COUNT: 25 % (ref 11.5–14.5)
ERYTHROCYTE [DISTWIDTH] IN BLOOD BY AUTOMATED COUNT: 25.1 % (ref 11.5–14.5)
ERYTHROCYTE [DISTWIDTH] IN BLOOD BY AUTOMATED COUNT: 25.3 % (ref 11.5–14.5)
ERYTHROCYTE [DISTWIDTH] IN BLOOD BY AUTOMATED COUNT: 25.3 % (ref 11.5–14.5)
ERYTHROCYTE [DISTWIDTH] IN BLOOD BY AUTOMATED COUNT: 25.5 % (ref 11.5–14.5)
ERYTHROCYTE [DISTWIDTH] IN BLOOD BY AUTOMATED COUNT: 26.4 % (ref 11.5–14.5)
ERYTHROCYTE [SEDIMENTATION RATE] IN BLOOD BY WESTERGREN METHOD: 16 MM/H
EST. GFR  (AFRICAN AMERICAN): 29.7 ML/MIN/1.73 M^2
EST. GFR  (AFRICAN AMERICAN): 31.5 ML/MIN/1.73 M^2
EST. GFR  (AFRICAN AMERICAN): 31.5 ML/MIN/1.73 M^2
EST. GFR  (AFRICAN AMERICAN): 38.3 ML/MIN/1.73 M^2
EST. GFR  (AFRICAN AMERICAN): 41.2 ML/MIN/1.73 M^2
EST. GFR  (AFRICAN AMERICAN): 44.6 ML/MIN/1.73 M^2
EST. GFR  (AFRICAN AMERICAN): 48.5 ML/MIN/1.73 M^2
EST. GFR  (AFRICAN AMERICAN): 48.5 ML/MIN/1.73 M^2
EST. GFR  (AFRICAN AMERICAN): 53 ML/MIN/1.73 M^2
EST. GFR  (AFRICAN AMERICAN): 58.4 ML/MIN/1.73 M^2
EST. GFR  (AFRICAN AMERICAN): >60 ML/MIN/1.73 M^2
EST. GFR  (NON AFRICAN AMERICAN): 25.7 ML/MIN/1.73 M^2
EST. GFR  (NON AFRICAN AMERICAN): 27.3 ML/MIN/1.73 M^2
EST. GFR  (NON AFRICAN AMERICAN): 27.3 ML/MIN/1.73 M^2
EST. GFR  (NON AFRICAN AMERICAN): 33.2 ML/MIN/1.73 M^2
EST. GFR  (NON AFRICAN AMERICAN): 35.8 ML/MIN/1.73 M^2
EST. GFR  (NON AFRICAN AMERICAN): 38.7 ML/MIN/1.73 M^2
EST. GFR  (NON AFRICAN AMERICAN): 42 ML/MIN/1.73 M^2
EST. GFR  (NON AFRICAN AMERICAN): 42 ML/MIN/1.73 M^2
EST. GFR  (NON AFRICAN AMERICAN): 46 ML/MIN/1.73 M^2
EST. GFR  (NON AFRICAN AMERICAN): 50.6 ML/MIN/1.73 M^2
EST. GFR  (NON AFRICAN AMERICAN): 56.3 ML/MIN/1.73 M^2
EST. GFR  (NON AFRICAN AMERICAN): >60 ML/MIN/1.73 M^2
FIBRINOGEN PPP-MCNC: 100 MG/DL (ref 182–366)
FIBRINOGEN PPP-MCNC: 101 MG/DL (ref 182–366)
FIBRINOGEN PPP-MCNC: 103 MG/DL (ref 182–366)
FIBRINOGEN PPP-MCNC: 108 MG/DL (ref 182–366)
FIBRINOGEN PPP-MCNC: 108 MG/DL (ref 182–366)
FIBRINOGEN PPP-MCNC: 110 MG/DL (ref 182–366)
FIBRINOGEN PPP-MCNC: 111 MG/DL (ref 182–366)
FIBRINOGEN PPP-MCNC: 112 MG/DL (ref 182–366)
FIBRINOGEN PPP-MCNC: 113 MG/DL (ref 182–366)
FIBRINOGEN PPP-MCNC: 77 MG/DL (ref 182–366)
FIBRINOGEN PPP-MCNC: 85 MG/DL (ref 182–366)
FIBRINOGEN PPP-MCNC: 85 MG/DL (ref 182–366)
FIBRINOGEN PPP-MCNC: 94 MG/DL (ref 182–366)
FIBRINOGEN PPP-MCNC: <70 MG/DL (ref 182–366)
FIO2: 28
FIO2: 40
FLOW: 2
FLOW: 2
FLOW: 4
FLOW: 4
GLUCOSE SERPL-MCNC: 100 MG/DL (ref 70–110)
GLUCOSE SERPL-MCNC: 101 MG/DL (ref 70–110)
GLUCOSE SERPL-MCNC: 101 MG/DL (ref 70–110)
GLUCOSE SERPL-MCNC: 103 MG/DL (ref 70–110)
GLUCOSE SERPL-MCNC: 104 MG/DL (ref 70–110)
GLUCOSE SERPL-MCNC: 106 MG/DL (ref 70–110)
GLUCOSE SERPL-MCNC: 108 MG/DL (ref 70–110)
GLUCOSE SERPL-MCNC: 110 MG/DL (ref 70–110)
GLUCOSE SERPL-MCNC: 112 MG/DL (ref 70–110)
GLUCOSE SERPL-MCNC: 67 MG/DL (ref 70–110)
GLUCOSE SERPL-MCNC: 74 MG/DL (ref 70–110)
GLUCOSE SERPL-MCNC: 75 MG/DL (ref 70–110)
GLUCOSE SERPL-MCNC: 78 MG/DL (ref 70–110)
GLUCOSE SERPL-MCNC: 80 MG/DL (ref 70–110)
GLUCOSE SERPL-MCNC: 82 MG/DL (ref 70–110)
GLUCOSE SERPL-MCNC: 84 MG/DL (ref 70–110)
GLUCOSE SERPL-MCNC: 84 MG/DL (ref 70–110)
GLUCOSE SERPL-MCNC: 85 MG/DL (ref 70–110)
GLUCOSE SERPL-MCNC: 90 MG/DL (ref 70–110)
GLUCOSE SERPL-MCNC: 96 MG/DL (ref 70–110)
HCO3 UR-SCNC: 21.2 MMOL/L (ref 24–28)
HCO3 UR-SCNC: 21.6 MMOL/L (ref 24–28)
HCO3 UR-SCNC: 21.8 MMOL/L (ref 24–28)
HCO3 UR-SCNC: 24.4 MMOL/L (ref 24–28)
HCO3 UR-SCNC: 24.6 MMOL/L (ref 24–28)
HCT VFR BLD AUTO: 18.5 % (ref 37–48.5)
HCT VFR BLD AUTO: 19.4 % (ref 37–48.5)
HCT VFR BLD AUTO: 22.9 % (ref 37–48.5)
HCT VFR BLD AUTO: 22.9 % (ref 37–48.5)
HCT VFR BLD AUTO: 23.1 % (ref 37–48.5)
HCT VFR BLD AUTO: 23.2 % (ref 37–48.5)
HCT VFR BLD AUTO: 23.3 % (ref 37–48.5)
HCT VFR BLD AUTO: 23.5 % (ref 37–48.5)
HCT VFR BLD AUTO: 23.7 % (ref 37–48.5)
HCT VFR BLD AUTO: 23.8 % (ref 37–48.5)
HCT VFR BLD AUTO: 24.1 % (ref 37–48.5)
HCT VFR BLD AUTO: 24.5 % (ref 37–48.5)
HCT VFR BLD AUTO: 24.8 % (ref 37–48.5)
HCT VFR BLD AUTO: 25 % (ref 37–48.5)
HCT VFR BLD AUTO: 25 % (ref 37–48.5)
HCT VFR BLD AUTO: 25.1 % (ref 37–48.5)
HCT VFR BLD AUTO: 25.2 % (ref 37–48.5)
HCT VFR BLD AUTO: 25.3 % (ref 37–48.5)
HCT VFR BLD AUTO: 25.5 % (ref 37–48.5)
HCT VFR BLD AUTO: 25.6 % (ref 37–48.5)
HCT VFR BLD AUTO: 25.8 % (ref 37–48.5)
HCT VFR BLD AUTO: 26.1 % (ref 37–48.5)
HCT VFR BLD AUTO: 26.2 % (ref 37–48.5)
HCT VFR BLD AUTO: 26.4 % (ref 37–48.5)
HCT VFR BLD AUTO: 26.6 % (ref 37–48.5)
HCT VFR BLD AUTO: 27.8 % (ref 37–48.5)
HCT VFR BLD AUTO: 29.5 % (ref 37–48.5)
HCT VFR BLD AUTO: 30.1 % (ref 37–48.5)
HCT VFR BLD AUTO: 32 % (ref 37–48.5)
HGB BLD-MCNC: 5.7 G/DL (ref 12–16)
HGB BLD-MCNC: 5.7 G/DL (ref 12–16)
HGB BLD-MCNC: 6.9 G/DL (ref 12–16)
HGB BLD-MCNC: 6.9 G/DL (ref 12–16)
HGB BLD-MCNC: 7.2 G/DL (ref 12–16)
HGB BLD-MCNC: 7.3 G/DL (ref 12–16)
HGB BLD-MCNC: 7.4 G/DL (ref 12–16)
HGB BLD-MCNC: 7.5 G/DL (ref 12–16)
HGB BLD-MCNC: 7.6 G/DL (ref 12–16)
HGB BLD-MCNC: 7.7 G/DL (ref 12–16)
HGB BLD-MCNC: 7.8 G/DL (ref 12–16)
HGB BLD-MCNC: 7.9 G/DL (ref 12–16)
HGB BLD-MCNC: 7.9 G/DL (ref 12–16)
HGB BLD-MCNC: 8 G/DL (ref 12–16)
HGB BLD-MCNC: 8.1 G/DL (ref 12–16)
HGB BLD-MCNC: 8.1 G/DL (ref 12–16)
HGB BLD-MCNC: 8.5 G/DL (ref 12–16)
HGB BLD-MCNC: 8.6 G/DL (ref 12–16)
HGB BLD-MCNC: 9.2 G/DL (ref 12–16)
HGB BLD-MCNC: 9.2 G/DL (ref 12–16)
HGB BLD-MCNC: 9.6 G/DL (ref 12–16)
HYPOCHROMIA BLD QL SMEAR: ABNORMAL
IMM GRANULOCYTES # BLD AUTO: 0.02 K/UL (ref 0–0.04)
IMM GRANULOCYTES # BLD AUTO: 0.03 K/UL (ref 0–0.04)
IMM GRANULOCYTES # BLD AUTO: 0.03 K/UL (ref 0–0.04)
IMM GRANULOCYTES # BLD AUTO: 0.05 K/UL (ref 0–0.04)
IMM GRANULOCYTES # BLD AUTO: 0.06 K/UL (ref 0–0.04)
IMM GRANULOCYTES # BLD AUTO: 0.07 K/UL (ref 0–0.04)
IMM GRANULOCYTES # BLD AUTO: 0.08 K/UL (ref 0–0.04)
IMM GRANULOCYTES # BLD AUTO: 0.09 K/UL (ref 0–0.04)
IMM GRANULOCYTES # BLD AUTO: 0.1 K/UL (ref 0–0.04)
IMM GRANULOCYTES # BLD AUTO: 0.11 K/UL (ref 0–0.04)
IMM GRANULOCYTES # BLD AUTO: 0.11 K/UL (ref 0–0.04)
IMM GRANULOCYTES # BLD AUTO: 0.12 K/UL (ref 0–0.04)
IMM GRANULOCYTES # BLD AUTO: 0.14 K/UL (ref 0–0.04)
IMM GRANULOCYTES # BLD AUTO: 0.17 K/UL (ref 0–0.04)
IMM GRANULOCYTES NFR BLD AUTO: 0.5 % (ref 0–0.5)
IMM GRANULOCYTES NFR BLD AUTO: 0.5 % (ref 0–0.5)
IMM GRANULOCYTES NFR BLD AUTO: 0.6 % (ref 0–0.5)
IMM GRANULOCYTES NFR BLD AUTO: 0.7 % (ref 0–0.5)
IMM GRANULOCYTES NFR BLD AUTO: 0.8 % (ref 0–0.5)
IMM GRANULOCYTES NFR BLD AUTO: 1 % (ref 0–0.5)
IMM GRANULOCYTES NFR BLD AUTO: 1.1 % (ref 0–0.5)
IMM GRANULOCYTES NFR BLD AUTO: 1.2 % (ref 0–0.5)
IMM GRANULOCYTES NFR BLD AUTO: 1.3 % (ref 0–0.5)
IMM GRANULOCYTES NFR BLD AUTO: 1.4 % (ref 0–0.5)
IMM GRANULOCYTES NFR BLD AUTO: 1.5 % (ref 0–0.5)
IMM GRANULOCYTES NFR BLD AUTO: 1.7 % (ref 0–0.5)
IMM GRANULOCYTES NFR BLD AUTO: 1.7 % (ref 0–0.5)
IMM GRANULOCYTES NFR BLD AUTO: 1.9 % (ref 0–0.5)
IMM GRANULOCYTES NFR BLD AUTO: 2.5 % (ref 0–0.5)
IMM GRANULOCYTES NFR BLD AUTO: 3 % (ref 0–0.5)
IMM GRANULOCYTES NFR BLD AUTO: 3.2 % (ref 0–0.5)
INR PPP: 1.4 (ref 0.8–1.2)
INR PPP: 1.5 (ref 0.8–1.2)
INR PPP: 1.7 (ref 0.8–1.2)
INR PPP: 1.8 (ref 0.8–1.2)
INR PPP: 1.9 (ref 0.8–1.2)
INR PPP: 1.9 (ref 0.8–1.2)
INR PPP: 2.5 (ref 0.8–1.2)
INR PPP: 2.6 (ref 0.8–1.2)
INR PPP: 2.9 (ref 0.8–1.2)
LACTATE SERPL-SCNC: 3.2 MMOL/L (ref 0.5–2.2)
LACTATE SERPL-SCNC: 5.1 MMOL/L (ref 0.5–2.2)
LACTATE SERPL-SCNC: 6 MMOL/L (ref 0.5–2.2)
LACTATE SERPL-SCNC: 7.6 MMOL/L (ref 0.5–2.2)
LACTATE SERPL-SCNC: 8.6 MMOL/L (ref 0.5–2.2)
LACTATE SERPL-SCNC: 9.5 MMOL/L (ref 0.5–2.2)
LDH SERPL L TO P-CCNC: 398 U/L (ref 110–260)
LDH SERPL L TO P-CCNC: 417 U/L (ref 110–260)
LYMPHOCYTES # BLD AUTO: 0.2 K/UL (ref 1–4.8)
LYMPHOCYTES # BLD AUTO: 0.3 K/UL (ref 1–4.8)
LYMPHOCYTES # BLD AUTO: 0.4 K/UL (ref 1–4.8)
LYMPHOCYTES # BLD AUTO: 0.5 K/UL (ref 1–4.8)
LYMPHOCYTES # BLD AUTO: 0.6 K/UL (ref 1–4.8)
LYMPHOCYTES # BLD AUTO: 0.7 K/UL (ref 1–4.8)
LYMPHOCYTES NFR BLD: 10.7 % (ref 18–48)
LYMPHOCYTES NFR BLD: 3.5 % (ref 18–48)
LYMPHOCYTES NFR BLD: 3.6 % (ref 18–48)
LYMPHOCYTES NFR BLD: 4.2 % (ref 18–48)
LYMPHOCYTES NFR BLD: 4.2 % (ref 18–48)
LYMPHOCYTES NFR BLD: 4.6 % (ref 18–48)
LYMPHOCYTES NFR BLD: 4.6 % (ref 18–48)
LYMPHOCYTES NFR BLD: 4.9 % (ref 18–48)
LYMPHOCYTES NFR BLD: 5 % (ref 18–48)
LYMPHOCYTES NFR BLD: 5 % (ref 18–48)
LYMPHOCYTES NFR BLD: 5.1 % (ref 18–48)
LYMPHOCYTES NFR BLD: 5.1 % (ref 18–48)
LYMPHOCYTES NFR BLD: 5.2 % (ref 18–48)
LYMPHOCYTES NFR BLD: 5.2 % (ref 18–48)
LYMPHOCYTES NFR BLD: 5.4 % (ref 18–48)
LYMPHOCYTES NFR BLD: 5.5 % (ref 18–48)
LYMPHOCYTES NFR BLD: 5.5 % (ref 18–48)
LYMPHOCYTES NFR BLD: 5.8 % (ref 18–48)
LYMPHOCYTES NFR BLD: 5.8 % (ref 18–48)
LYMPHOCYTES NFR BLD: 5.9 % (ref 18–48)
LYMPHOCYTES NFR BLD: 6.1 % (ref 18–48)
LYMPHOCYTES NFR BLD: 6.3 % (ref 18–48)
LYMPHOCYTES NFR BLD: 6.5 % (ref 18–48)
LYMPHOCYTES NFR BLD: 6.8 % (ref 18–48)
LYMPHOCYTES NFR BLD: 7 % (ref 18–48)
LYMPHOCYTES NFR BLD: 7.1 % (ref 18–48)
LYMPHOCYTES NFR BLD: 7.2 % (ref 18–48)
LYMPHOCYTES NFR BLD: 7.3 % (ref 18–48)
LYMPHOCYTES NFR BLD: 7.3 % (ref 18–48)
LYMPHOCYTES NFR BLD: 7.9 % (ref 18–48)
LYMPHOCYTES NFR BLD: 8.5 % (ref 18–48)
LYMPHOCYTES NFR BLD: 9.3 % (ref 18–48)
LYMPHOCYTES NFR BLD: 9.3 % (ref 18–48)
MAGNESIUM SERPL-MCNC: 1.7 MG/DL (ref 1.6–2.6)
MAGNESIUM SERPL-MCNC: 1.8 MG/DL (ref 1.6–2.6)
MAGNESIUM SERPL-MCNC: 1.9 MG/DL (ref 1.6–2.6)
MAGNESIUM SERPL-MCNC: 2 MG/DL (ref 1.6–2.6)
MAGNESIUM SERPL-MCNC: 2.1 MG/DL (ref 1.6–2.6)
MAGNESIUM SERPL-MCNC: 2.1 MG/DL (ref 1.6–2.6)
MAGNESIUM SERPL-MCNC: 2.2 MG/DL (ref 1.6–2.6)
MAGNESIUM SERPL-MCNC: 2.3 MG/DL (ref 1.6–2.6)
MAGNESIUM SERPL-MCNC: 2.4 MG/DL (ref 1.6–2.6)
MAGNESIUM SERPL-MCNC: 2.5 MG/DL (ref 1.6–2.6)
MAGNESIUM SERPL-MCNC: 2.5 MG/DL (ref 1.6–2.6)
MCH RBC QN AUTO: 32.9 PG (ref 27–31)
MCH RBC QN AUTO: 33.3 PG (ref 27–31)
MCH RBC QN AUTO: 33.5 PG (ref 27–31)
MCH RBC QN AUTO: 33.6 PG (ref 27–31)
MCH RBC QN AUTO: 33.6 PG (ref 27–31)
MCH RBC QN AUTO: 33.9 PG (ref 27–31)
MCH RBC QN AUTO: 34.1 PG (ref 27–31)
MCH RBC QN AUTO: 34.2 PG (ref 27–31)
MCH RBC QN AUTO: 34.2 PG (ref 27–31)
MCH RBC QN AUTO: 34.4 PG (ref 27–31)
MCH RBC QN AUTO: 34.6 PG (ref 27–31)
MCH RBC QN AUTO: 34.7 PG (ref 27–31)
MCH RBC QN AUTO: 34.8 PG (ref 27–31)
MCH RBC QN AUTO: 35 PG (ref 27–31)
MCH RBC QN AUTO: 35.2 PG (ref 27–31)
MCH RBC QN AUTO: 35.3 PG (ref 27–31)
MCH RBC QN AUTO: 35.4 PG (ref 27–31)
MCH RBC QN AUTO: 35.4 PG (ref 27–31)
MCH RBC QN AUTO: 35.6 PG (ref 27–31)
MCH RBC QN AUTO: 35.7 PG (ref 27–31)
MCH RBC QN AUTO: 35.8 PG (ref 27–31)
MCH RBC QN AUTO: 35.8 PG (ref 27–31)
MCH RBC QN AUTO: 36.1 PG (ref 27–31)
MCH RBC QN AUTO: 36.2 PG (ref 27–31)
MCH RBC QN AUTO: 36.4 PG (ref 27–31)
MCHC RBC AUTO-ENTMCNC: 29.3 G/DL (ref 32–36)
MCHC RBC AUTO-ENTMCNC: 29.4 G/DL (ref 32–36)
MCHC RBC AUTO-ENTMCNC: 29.4 G/DL (ref 32–36)
MCHC RBC AUTO-ENTMCNC: 29.8 G/DL (ref 32–36)
MCHC RBC AUTO-ENTMCNC: 30 G/DL (ref 32–36)
MCHC RBC AUTO-ENTMCNC: 30.1 G/DL (ref 32–36)
MCHC RBC AUTO-ENTMCNC: 30.1 G/DL (ref 32–36)
MCHC RBC AUTO-ENTMCNC: 30.2 G/DL (ref 32–36)
MCHC RBC AUTO-ENTMCNC: 30.3 G/DL (ref 32–36)
MCHC RBC AUTO-ENTMCNC: 30.4 G/DL (ref 32–36)
MCHC RBC AUTO-ENTMCNC: 30.6 G/DL (ref 32–36)
MCHC RBC AUTO-ENTMCNC: 30.7 G/DL (ref 32–36)
MCHC RBC AUTO-ENTMCNC: 30.8 G/DL (ref 32–36)
MCHC RBC AUTO-ENTMCNC: 31 G/DL (ref 32–36)
MCHC RBC AUTO-ENTMCNC: 31 G/DL (ref 32–36)
MCHC RBC AUTO-ENTMCNC: 31.1 G/DL (ref 32–36)
MCHC RBC AUTO-ENTMCNC: 31.1 G/DL (ref 32–36)
MCHC RBC AUTO-ENTMCNC: 31.2 G/DL (ref 32–36)
MCHC RBC AUTO-ENTMCNC: 31.5 G/DL (ref 32–36)
MCHC RBC AUTO-ENTMCNC: 31.6 G/DL (ref 32–36)
MCHC RBC AUTO-ENTMCNC: 31.8 G/DL (ref 32–36)
MCHC RBC AUTO-ENTMCNC: 31.9 G/DL (ref 32–36)
MCHC RBC AUTO-ENTMCNC: 32 G/DL (ref 32–36)
MCHC RBC AUTO-ENTMCNC: 32 G/DL (ref 32–36)
MCHC RBC AUTO-ENTMCNC: 32.3 G/DL (ref 32–36)
MCHC RBC AUTO-ENTMCNC: 32.5 G/DL (ref 32–36)
MCHC RBC AUTO-ENTMCNC: 32.6 G/DL (ref 32–36)
MCV RBC AUTO: 106 FL (ref 82–98)
MCV RBC AUTO: 107 FL (ref 82–98)
MCV RBC AUTO: 109 FL (ref 82–98)
MCV RBC AUTO: 110 FL (ref 82–98)
MCV RBC AUTO: 111 FL (ref 82–98)
MCV RBC AUTO: 112 FL (ref 82–98)
MCV RBC AUTO: 113 FL (ref 82–98)
MCV RBC AUTO: 115 FL (ref 82–98)
MCV RBC AUTO: 116 FL (ref 82–98)
MCV RBC AUTO: 117 FL (ref 82–98)
MCV RBC AUTO: 118 FL (ref 82–98)
MCV RBC AUTO: 119 FL (ref 82–98)
MCV RBC AUTO: 121 FL (ref 82–98)
MODE: ABNORMAL
MODE: NORMAL
MODE: NORMAL
MONOCYTES # BLD AUTO: 0.2 K/UL (ref 0.3–1)
MONOCYTES # BLD AUTO: 0.3 K/UL (ref 0.3–1)
MONOCYTES # BLD AUTO: 0.4 K/UL (ref 0.3–1)
MONOCYTES # BLD AUTO: 0.5 K/UL (ref 0.3–1)
MONOCYTES # BLD AUTO: 0.6 K/UL (ref 0.3–1)
MONOCYTES # BLD AUTO: 0.7 K/UL (ref 0.3–1)
MONOCYTES NFR BLD: 4 % (ref 4–15)
MONOCYTES NFR BLD: 4.3 % (ref 4–15)
MONOCYTES NFR BLD: 4.7 % (ref 4–15)
MONOCYTES NFR BLD: 5.1 % (ref 4–15)
MONOCYTES NFR BLD: 5.2 % (ref 4–15)
MONOCYTES NFR BLD: 5.4 % (ref 4–15)
MONOCYTES NFR BLD: 5.4 % (ref 4–15)
MONOCYTES NFR BLD: 5.5 % (ref 4–15)
MONOCYTES NFR BLD: 5.6 % (ref 4–15)
MONOCYTES NFR BLD: 5.6 % (ref 4–15)
MONOCYTES NFR BLD: 5.9 % (ref 4–15)
MONOCYTES NFR BLD: 5.9 % (ref 4–15)
MONOCYTES NFR BLD: 6 % (ref 4–15)
MONOCYTES NFR BLD: 6.4 % (ref 4–15)
MONOCYTES NFR BLD: 6.5 % (ref 4–15)
MONOCYTES NFR BLD: 6.6 % (ref 4–15)
MONOCYTES NFR BLD: 6.7 % (ref 4–15)
MONOCYTES NFR BLD: 6.7 % (ref 4–15)
MONOCYTES NFR BLD: 6.8 % (ref 4–15)
MONOCYTES NFR BLD: 7.2 % (ref 4–15)
MONOCYTES NFR BLD: 7.3 % (ref 4–15)
MONOCYTES NFR BLD: 7.3 % (ref 4–15)
MONOCYTES NFR BLD: 7.4 % (ref 4–15)
MONOCYTES NFR BLD: 7.4 % (ref 4–15)
MONOCYTES NFR BLD: 7.6 % (ref 4–15)
MONOCYTES NFR BLD: 7.9 % (ref 4–15)
MONOCYTES NFR BLD: 8.2 % (ref 4–15)
MONOCYTES NFR BLD: 8.8 % (ref 4–15)
NEUTROPHILS # BLD AUTO: 13.9 K/UL (ref 1.8–7.7)
NEUTROPHILS # BLD AUTO: 3 K/UL (ref 1.8–7.7)
NEUTROPHILS # BLD AUTO: 3 K/UL (ref 1.8–7.7)
NEUTROPHILS # BLD AUTO: 3.1 K/UL (ref 1.8–7.7)
NEUTROPHILS # BLD AUTO: 3.1 K/UL (ref 1.8–7.7)
NEUTROPHILS # BLD AUTO: 3.7 K/UL (ref 1.8–7.7)
NEUTROPHILS # BLD AUTO: 3.8 K/UL (ref 1.8–7.7)
NEUTROPHILS # BLD AUTO: 4.2 K/UL (ref 1.8–7.7)
NEUTROPHILS # BLD AUTO: 4.2 K/UL (ref 1.8–7.7)
NEUTROPHILS # BLD AUTO: 4.5 K/UL (ref 1.8–7.7)
NEUTROPHILS # BLD AUTO: 4.5 K/UL (ref 1.8–7.7)
NEUTROPHILS # BLD AUTO: 4.7 K/UL (ref 1.8–7.7)
NEUTROPHILS # BLD AUTO: 4.7 K/UL (ref 1.8–7.7)
NEUTROPHILS # BLD AUTO: 5 K/UL (ref 1.8–7.7)
NEUTROPHILS # BLD AUTO: 5 K/UL (ref 1.8–7.7)
NEUTROPHILS # BLD AUTO: 5.4 K/UL (ref 1.8–7.7)
NEUTROPHILS # BLD AUTO: 5.5 K/UL (ref 1.8–7.7)
NEUTROPHILS # BLD AUTO: 6.1 K/UL (ref 1.8–7.7)
NEUTROPHILS # BLD AUTO: 6.2 K/UL (ref 1.8–7.7)
NEUTROPHILS # BLD AUTO: 6.2 K/UL (ref 1.8–7.7)
NEUTROPHILS # BLD AUTO: 6.4 K/UL (ref 1.8–7.7)
NEUTROPHILS # BLD AUTO: 6.8 K/UL (ref 1.8–7.7)
NEUTROPHILS # BLD AUTO: 7 K/UL (ref 1.8–7.7)
NEUTROPHILS # BLD AUTO: 7 K/UL (ref 1.8–7.7)
NEUTROPHILS # BLD AUTO: 7.2 K/UL (ref 1.8–7.7)
NEUTROPHILS # BLD AUTO: 8.1 K/UL (ref 1.8–7.7)
NEUTROPHILS # BLD AUTO: 8.1 K/UL (ref 1.8–7.7)
NEUTROPHILS # BLD AUTO: 8.3 K/UL (ref 1.8–7.7)
NEUTROPHILS # BLD AUTO: 8.4 K/UL (ref 1.8–7.7)
NEUTROPHILS # BLD AUTO: 8.4 K/UL (ref 1.8–7.7)
NEUTROPHILS # BLD AUTO: 8.6 K/UL (ref 1.8–7.7)
NEUTROPHILS # BLD AUTO: 8.7 K/UL (ref 1.8–7.7)
NEUTROPHILS # BLD AUTO: 9.5 K/UL (ref 1.8–7.7)
NEUTROPHILS NFR BLD: 77.1 % (ref 38–73)
NEUTROPHILS NFR BLD: 79.8 % (ref 38–73)
NEUTROPHILS NFR BLD: 80.5 % (ref 38–73)
NEUTROPHILS NFR BLD: 81.5 % (ref 38–73)
NEUTROPHILS NFR BLD: 81.6 % (ref 38–73)
NEUTROPHILS NFR BLD: 82.7 % (ref 38–73)
NEUTROPHILS NFR BLD: 83.3 % (ref 38–73)
NEUTROPHILS NFR BLD: 84.2 % (ref 38–73)
NEUTROPHILS NFR BLD: 85 % (ref 38–73)
NEUTROPHILS NFR BLD: 85.1 % (ref 38–73)
NEUTROPHILS NFR BLD: 85.2 % (ref 38–73)
NEUTROPHILS NFR BLD: 85.5 % (ref 38–73)
NEUTROPHILS NFR BLD: 85.5 % (ref 38–73)
NEUTROPHILS NFR BLD: 85.6 % (ref 38–73)
NEUTROPHILS NFR BLD: 85.6 % (ref 38–73)
NEUTROPHILS NFR BLD: 86.1 % (ref 38–73)
NEUTROPHILS NFR BLD: 86.1 % (ref 38–73)
NEUTROPHILS NFR BLD: 86.3 % (ref 38–73)
NEUTROPHILS NFR BLD: 86.4 % (ref 38–73)
NEUTROPHILS NFR BLD: 86.5 % (ref 38–73)
NEUTROPHILS NFR BLD: 86.9 % (ref 38–73)
NEUTROPHILS NFR BLD: 87 % (ref 38–73)
NEUTROPHILS NFR BLD: 87.3 % (ref 38–73)
NEUTROPHILS NFR BLD: 87.5 % (ref 38–73)
NEUTROPHILS NFR BLD: 87.5 % (ref 38–73)
NEUTROPHILS NFR BLD: 87.7 % (ref 38–73)
NEUTROPHILS NFR BLD: 87.9 % (ref 38–73)
NEUTROPHILS NFR BLD: 87.9 % (ref 38–73)
NEUTROPHILS NFR BLD: 88 % (ref 38–73)
NEUTROPHILS NFR BLD: 88.1 % (ref 38–73)
NEUTROPHILS NFR BLD: 90.1 % (ref 38–73)
NEUTROPHILS NFR BLD: 90.6 % (ref 38–73)
NEUTROPHILS NFR BLD: 91 % (ref 38–73)
NRBC BLD-RTO: 0 /100 WBC
NRBC BLD-RTO: 1 /100 WBC
NRBC BLD-RTO: 2 /100 WBC
NUM UNITS TRANS FFP: NORMAL
OVALOCYTES BLD QL SMEAR: ABNORMAL
PCO2 BLDA: 36.3 MMHG (ref 35–45)
PCO2 BLDA: 38.3 MMHG (ref 35–45)
PCO2 BLDA: 38.8 MMHG (ref 35–45)
PCO2 BLDA: 39.3 MMHG (ref 35–45)
PCO2 BLDA: 40.8 MMHG (ref 35–45)
PEEP: 8
PH SMN: 7.34 [PH] (ref 7.35–7.45)
PH SMN: 7.35 [PH] (ref 7.35–7.45)
PH SMN: 7.36 [PH] (ref 7.35–7.45)
PH SMN: 7.38 [PH] (ref 7.35–7.45)
PH SMN: 7.44 [PH] (ref 7.35–7.45)
PHOSPHATE SERPL-MCNC: 2.8 MG/DL (ref 2.7–4.5)
PHOSPHATE SERPL-MCNC: 3 MG/DL (ref 2.7–4.5)
PHOSPHATE SERPL-MCNC: 3.1 MG/DL (ref 2.7–4.5)
PHOSPHATE SERPL-MCNC: 3.1 MG/DL (ref 2.7–4.5)
PHOSPHATE SERPL-MCNC: 3.2 MG/DL (ref 2.7–4.5)
PHOSPHATE SERPL-MCNC: 3.4 MG/DL (ref 2.7–4.5)
PHOSPHATE SERPL-MCNC: 3.5 MG/DL (ref 2.7–4.5)
PHOSPHATE SERPL-MCNC: 3.8 MG/DL (ref 2.7–4.5)
PHOSPHATE SERPL-MCNC: 4 MG/DL (ref 2.7–4.5)
PHOSPHATE SERPL-MCNC: 4.3 MG/DL (ref 2.7–4.5)
PHOSPHATE SERPL-MCNC: 4.7 MG/DL (ref 2.7–4.5)
PHOSPHATE SERPL-MCNC: 4.8 MG/DL (ref 2.7–4.5)
PHOSPHATE SERPL-MCNC: 4.9 MG/DL (ref 2.7–4.5)
PHOSPHATE SERPL-MCNC: 5 MG/DL (ref 2.7–4.5)
PLATELET # BLD AUTO: 27 K/UL (ref 150–350)
PLATELET # BLD AUTO: 33 K/UL (ref 150–350)
PLATELET # BLD AUTO: 40 K/UL (ref 150–350)
PLATELET # BLD AUTO: 41 K/UL (ref 150–350)
PLATELET # BLD AUTO: 41 K/UL (ref 150–350)
PLATELET # BLD AUTO: 42 K/UL (ref 150–350)
PLATELET # BLD AUTO: 43 K/UL (ref 150–350)
PLATELET # BLD AUTO: 44 K/UL (ref 150–350)
PLATELET # BLD AUTO: 44 K/UL (ref 150–350)
PLATELET # BLD AUTO: 45 K/UL (ref 150–350)
PLATELET # BLD AUTO: 45 K/UL (ref 150–350)
PLATELET # BLD AUTO: 46 K/UL (ref 150–350)
PLATELET # BLD AUTO: 48 K/UL (ref 150–350)
PLATELET # BLD AUTO: 49 K/UL (ref 150–350)
PLATELET # BLD AUTO: 50 K/UL (ref 150–350)
PLATELET # BLD AUTO: 52 K/UL (ref 150–350)
PLATELET # BLD AUTO: 53 K/UL (ref 150–350)
PLATELET # BLD AUTO: 54 K/UL (ref 150–350)
PLATELET # BLD AUTO: 55 K/UL (ref 150–350)
PLATELET # BLD AUTO: 57 K/UL (ref 150–350)
PLATELET # BLD AUTO: 58 K/UL (ref 150–350)
PLATELET # BLD AUTO: 58 K/UL (ref 150–350)
PLATELET # BLD AUTO: 59 K/UL (ref 150–350)
PLATELET # BLD AUTO: 60 K/UL (ref 150–350)
PLATELET # BLD AUTO: 60 K/UL (ref 150–350)
PLATELET # BLD AUTO: 62 K/UL (ref 150–350)
PLATELET # BLD AUTO: 63 K/UL (ref 150–350)
PLATELET # BLD AUTO: 65 K/UL (ref 150–350)
PLATELET # BLD AUTO: 71 K/UL (ref 150–350)
PLATELET BLD QL SMEAR: ABNORMAL
PMV BLD AUTO: 10.2 FL (ref 9.2–12.9)
PMV BLD AUTO: 10.3 FL (ref 9.2–12.9)
PMV BLD AUTO: 10.3 FL (ref 9.2–12.9)
PMV BLD AUTO: 10.7 FL (ref 9.2–12.9)
PMV BLD AUTO: 10.8 FL (ref 9.2–12.9)
PMV BLD AUTO: 10.9 FL (ref 9.2–12.9)
PMV BLD AUTO: 11 FL (ref 9.2–12.9)
PMV BLD AUTO: 11 FL (ref 9.2–12.9)
PMV BLD AUTO: 11.1 FL (ref 9.2–12.9)
PMV BLD AUTO: 11.2 FL (ref 9.2–12.9)
PMV BLD AUTO: 11.2 FL (ref 9.2–12.9)
PMV BLD AUTO: 11.3 FL (ref 9.2–12.9)
PMV BLD AUTO: 11.3 FL (ref 9.2–12.9)
PMV BLD AUTO: 11.4 FL (ref 9.2–12.9)
PMV BLD AUTO: 11.5 FL (ref 9.2–12.9)
PMV BLD AUTO: 11.6 FL (ref 9.2–12.9)
PMV BLD AUTO: 11.7 FL (ref 9.2–12.9)
PMV BLD AUTO: 11.8 FL (ref 9.2–12.9)
PMV BLD AUTO: 11.8 FL (ref 9.2–12.9)
PMV BLD AUTO: 11.9 FL (ref 9.2–12.9)
PMV BLD AUTO: 12.4 FL (ref 9.2–12.9)
PO2 BLDA: 105 MMHG (ref 80–100)
PO2 BLDA: 113 MMHG (ref 80–100)
PO2 BLDA: 51 MMHG (ref 40–60)
PO2 BLDA: 86 MMHG (ref 80–100)
PO2 BLDA: 96 MMHG (ref 80–100)
POC BE: -1 MMOL/L
POC BE: -4 MMOL/L
POC BE: -4 MMOL/L
POC BE: -5 MMOL/L
POC BE: 0 MMOL/L
POC SATURATED O2: 84 % (ref 95–100)
POC SATURATED O2: 97 % (ref 95–100)
POC SATURATED O2: 97 % (ref 95–100)
POC SATURATED O2: 98 % (ref 95–100)
POC SATURATED O2: 98 % (ref 95–100)
POC TCO2: 22 MMOL/L (ref 23–27)
POC TCO2: 23 MMOL/L (ref 23–27)
POC TCO2: 23 MMOL/L (ref 24–29)
POC TCO2: 26 MMOL/L (ref 23–27)
POC TCO2: 26 MMOL/L (ref 23–27)
POCT GLUCOSE: 102 MG/DL (ref 70–110)
POCT GLUCOSE: 103 MG/DL (ref 70–110)
POCT GLUCOSE: 104 MG/DL (ref 70–110)
POCT GLUCOSE: 106 MG/DL (ref 70–110)
POCT GLUCOSE: 107 MG/DL (ref 70–110)
POCT GLUCOSE: 108 MG/DL (ref 70–110)
POCT GLUCOSE: 109 MG/DL (ref 70–110)
POCT GLUCOSE: 109 MG/DL (ref 70–110)
POCT GLUCOSE: 110 MG/DL (ref 70–110)
POCT GLUCOSE: 110 MG/DL (ref 70–110)
POCT GLUCOSE: 112 MG/DL (ref 70–110)
POCT GLUCOSE: 112 MG/DL (ref 70–110)
POCT GLUCOSE: 116 MG/DL (ref 70–110)
POCT GLUCOSE: 124 MG/DL (ref 70–110)
POCT GLUCOSE: 126 MG/DL (ref 70–110)
POCT GLUCOSE: 132 MG/DL (ref 70–110)
POCT GLUCOSE: 132 MG/DL (ref 70–110)
POCT GLUCOSE: 88 MG/DL (ref 70–110)
POCT GLUCOSE: 94 MG/DL (ref 70–110)
POCT GLUCOSE: 94 MG/DL (ref 70–110)
POCT GLUCOSE: 95 MG/DL (ref 70–110)
POCT GLUCOSE: 95 MG/DL (ref 70–110)
POCT GLUCOSE: 98 MG/DL (ref 70–110)
POIKILOCYTOSIS BLD QL SMEAR: ABNORMAL
POIKILOCYTOSIS BLD QL SMEAR: SLIGHT
POLYCHROMASIA BLD QL SMEAR: ABNORMAL
POOLED CRYOPPT GVN BPU: NORMAL
POTASSIUM SERPL-SCNC: 4 MMOL/L (ref 3.5–5.1)
POTASSIUM SERPL-SCNC: 4 MMOL/L (ref 3.5–5.1)
POTASSIUM SERPL-SCNC: 4.1 MMOL/L (ref 3.5–5.1)
POTASSIUM SERPL-SCNC: 4.5 MMOL/L (ref 3.5–5.1)
POTASSIUM SERPL-SCNC: 4.6 MMOL/L (ref 3.5–5.1)
POTASSIUM SERPL-SCNC: 4.7 MMOL/L (ref 3.5–5.1)
POTASSIUM SERPL-SCNC: 4.8 MMOL/L (ref 3.5–5.1)
POTASSIUM SERPL-SCNC: 4.8 MMOL/L (ref 3.5–5.1)
POTASSIUM SERPL-SCNC: 4.9 MMOL/L (ref 3.5–5.1)
POTASSIUM SERPL-SCNC: 4.9 MMOL/L (ref 3.5–5.1)
POTASSIUM SERPL-SCNC: 5 MMOL/L (ref 3.5–5.1)
POTASSIUM SERPL-SCNC: 5.4 MMOL/L (ref 3.5–5.1)
PROT SERPL-MCNC: 4.2 G/DL (ref 6–8.4)
PROT SERPL-MCNC: 4.3 G/DL (ref 6–8.4)
PROT SERPL-MCNC: 4.4 G/DL (ref 6–8.4)
PROT SERPL-MCNC: 4.4 G/DL (ref 6–8.4)
PROT SERPL-MCNC: 4.5 G/DL (ref 6–8.4)
PROT SERPL-MCNC: 4.6 G/DL (ref 6–8.4)
PROT SERPL-MCNC: 4.7 G/DL (ref 6–8.4)
PROT SERPL-MCNC: 4.8 G/DL (ref 6–8.4)
PROT SERPL-MCNC: 4.9 G/DL (ref 6–8.4)
PROT SERPL-MCNC: 5.3 G/DL (ref 6–8.4)
PROTHROMBIN TIME: 14 SEC (ref 9–12.5)
PROTHROMBIN TIME: 15.2 SEC (ref 9–12.5)
PROTHROMBIN TIME: 16.8 SEC (ref 9–12.5)
PROTHROMBIN TIME: 17.6 SEC (ref 9–12.5)
PROTHROMBIN TIME: 18.1 SEC (ref 9–12.5)
PROTHROMBIN TIME: 18.7 SEC (ref 9–12.5)
PROTHROMBIN TIME: 24.2 SEC (ref 9–12.5)
PROTHROMBIN TIME: 25 SEC (ref 9–12.5)
PROTHROMBIN TIME: 27.7 SEC (ref 9–12.5)
RBC # BLD AUTO: 1.58 M/UL (ref 4–5.4)
RBC # BLD AUTO: 1.6 M/UL (ref 4–5.4)
RBC # BLD AUTO: 2.02 M/UL (ref 4–5.4)
RBC # BLD AUTO: 2.04 M/UL (ref 4–5.4)
RBC # BLD AUTO: 2.06 M/UL (ref 4–5.4)
RBC # BLD AUTO: 2.09 M/UL (ref 4–5.4)
RBC # BLD AUTO: 2.1 M/UL (ref 4–5.4)
RBC # BLD AUTO: 2.14 M/UL (ref 4–5.4)
RBC # BLD AUTO: 2.16 M/UL (ref 4–5.4)
RBC # BLD AUTO: 2.18 M/UL (ref 4–5.4)
RBC # BLD AUTO: 2.18 M/UL (ref 4–5.4)
RBC # BLD AUTO: 2.2 M/UL (ref 4–5.4)
RBC # BLD AUTO: 2.21 M/UL (ref 4–5.4)
RBC # BLD AUTO: 2.22 M/UL (ref 4–5.4)
RBC # BLD AUTO: 2.23 M/UL (ref 4–5.4)
RBC # BLD AUTO: 2.24 M/UL (ref 4–5.4)
RBC # BLD AUTO: 2.25 M/UL (ref 4–5.4)
RBC # BLD AUTO: 2.27 M/UL (ref 4–5.4)
RBC # BLD AUTO: 2.27 M/UL (ref 4–5.4)
RBC # BLD AUTO: 2.28 M/UL (ref 4–5.4)
RBC # BLD AUTO: 2.29 M/UL (ref 4–5.4)
RBC # BLD AUTO: 2.34 M/UL (ref 4–5.4)
RBC # BLD AUTO: 2.4 M/UL (ref 4–5.4)
RBC # BLD AUTO: 2.49 M/UL (ref 4–5.4)
RBC # BLD AUTO: 2.53 M/UL (ref 4–5.4)
RBC # BLD AUTO: 2.54 M/UL (ref 4–5.4)
RBC # BLD AUTO: 2.77 M/UL (ref 4–5.4)
SAMPLE: ABNORMAL
SAMPLE: NORMAL
SAMPLE: NORMAL
SITE: ABNORMAL
SITE: NORMAL
SITE: NORMAL
SODIUM SERPL-SCNC: 137 MMOL/L (ref 136–145)
SODIUM SERPL-SCNC: 139 MMOL/L (ref 136–145)
SODIUM SERPL-SCNC: 139 MMOL/L (ref 136–145)
SODIUM SERPL-SCNC: 140 MMOL/L (ref 136–145)
SODIUM SERPL-SCNC: 141 MMOL/L (ref 136–145)
SODIUM SERPL-SCNC: 141 MMOL/L (ref 136–145)
SODIUM SERPL-SCNC: 142 MMOL/L (ref 136–145)
SODIUM SERPL-SCNC: 143 MMOL/L (ref 136–145)
SODIUM SERPL-SCNC: 143 MMOL/L (ref 136–145)
SODIUM SERPL-SCNC: 144 MMOL/L (ref 136–145)
SODIUM SERPL-SCNC: 145 MMOL/L (ref 136–145)
SODIUM SERPL-SCNC: 145 MMOL/L (ref 136–145)
SODIUM SERPL-SCNC: 146 MMOL/L (ref 136–145)
SODIUM SERPL-SCNC: 146 MMOL/L (ref 136–145)
SODIUM SERPL-SCNC: 147 MMOL/L (ref 136–145)
SODIUM SERPL-SCNC: 149 MMOL/L (ref 136–145)
SODIUM SERPL-SCNC: 151 MMOL/L (ref 136–145)
SODIUM SERPL-SCNC: 151 MMOL/L (ref 136–145)
SP02: 93
TARGETS BLD QL SMEAR: ABNORMAL
TOXIC GRANULES BLD QL SMEAR: PRESENT
TRANS ERYTHROCYTES VOL PATIENT: NORMAL ML
TRANS PLATPHERESIS VOL PATIENT: NORMAL ML
UNIT NUMBER: NORMAL
URATE SERPL-MCNC: 11 MG/DL (ref 2.4–5.7)
URATE SERPL-MCNC: 11.3 MG/DL (ref 2.4–5.7)
URATE SERPL-MCNC: 12.2 MG/DL (ref 2.4–5.7)
VT: 400
WBC # BLD AUTO: 10.78 K/UL (ref 3.9–12.7)
WBC # BLD AUTO: 15.33 K/UL (ref 3.9–12.7)
WBC # BLD AUTO: 3.69 K/UL (ref 3.9–12.7)
WBC # BLD AUTO: 3.76 K/UL (ref 3.9–12.7)
WBC # BLD AUTO: 3.78 K/UL (ref 3.9–12.7)
WBC # BLD AUTO: 4.02 K/UL (ref 3.9–12.7)
WBC # BLD AUTO: 4.33 K/UL (ref 3.9–12.7)
WBC # BLD AUTO: 4.62 K/UL (ref 3.9–12.7)
WBC # BLD AUTO: 4.88 K/UL (ref 3.9–12.7)
WBC # BLD AUTO: 4.94 K/UL (ref 3.9–12.7)
WBC # BLD AUTO: 5.14 K/UL (ref 3.9–12.7)
WBC # BLD AUTO: 5.23 K/UL (ref 3.9–12.7)
WBC # BLD AUTO: 5.39 K/UL (ref 3.9–12.7)
WBC # BLD AUTO: 5.53 K/UL (ref 3.9–12.7)
WBC # BLD AUTO: 5.63 K/UL (ref 3.9–12.7)
WBC # BLD AUTO: 5.89 K/UL (ref 3.9–12.7)
WBC # BLD AUTO: 6.27 K/UL (ref 3.9–12.7)
WBC # BLD AUTO: 6.44 K/UL (ref 3.9–12.7)
WBC # BLD AUTO: 7.13 K/UL (ref 3.9–12.7)
WBC # BLD AUTO: 7.21 K/UL (ref 3.9–12.7)
WBC # BLD AUTO: 7.29 K/UL (ref 3.9–12.7)
WBC # BLD AUTO: 7.65 K/UL (ref 3.9–12.7)
WBC # BLD AUTO: 8 K/UL (ref 3.9–12.7)
WBC # BLD AUTO: 8 K/UL (ref 3.9–12.7)
WBC # BLD AUTO: 8.09 K/UL (ref 3.9–12.7)
WBC # BLD AUTO: 8.16 K/UL (ref 3.9–12.7)
WBC # BLD AUTO: 9.27 K/UL (ref 3.9–12.7)
WBC # BLD AUTO: 9.31 K/UL (ref 3.9–12.7)
WBC # BLD AUTO: 9.44 K/UL (ref 3.9–12.7)
WBC # BLD AUTO: 9.45 K/UL (ref 3.9–12.7)
WBC # BLD AUTO: 9.71 K/UL (ref 3.9–12.7)
WBC # BLD AUTO: 9.92 K/UL (ref 3.9–12.7)
WBC # BLD AUTO: 9.94 K/UL (ref 3.9–12.7)

## 2019-01-01 PROCEDURE — 80053 COMPREHEN METABOLIC PANEL: CPT

## 2019-01-01 PROCEDURE — 99291 CRITICAL CARE FIRST HOUR: CPT | Mod: ,,, | Performed by: SURGERY

## 2019-01-01 PROCEDURE — 85025 COMPLETE CBC W/AUTO DIFF WBC: CPT

## 2019-01-01 PROCEDURE — 85730 THROMBOPLASTIN TIME PARTIAL: CPT

## 2019-01-01 PROCEDURE — 63600175 PHARM REV CODE 636 W HCPCS: Performed by: STUDENT IN AN ORGANIZED HEALTH CARE EDUCATION/TRAINING PROGRAM

## 2019-01-01 PROCEDURE — 25000003 PHARM REV CODE 250: Performed by: STUDENT IN AN ORGANIZED HEALTH CARE EDUCATION/TRAINING PROGRAM

## 2019-01-01 PROCEDURE — 99232 PR SUBSEQUENT HOSPITAL CARE,LEVL II: ICD-10-PCS | Mod: 24,GC,, | Performed by: SURGERY

## 2019-01-01 PROCEDURE — P9012 CRYOPRECIPITATE EACH UNIT: HCPCS

## 2019-01-01 PROCEDURE — 25000003 PHARM REV CODE 250: Performed by: HOSPITALIST

## 2019-01-01 PROCEDURE — 63600175 PHARM REV CODE 636 W HCPCS: Performed by: NURSE ANESTHETIST, CERTIFIED REGISTERED

## 2019-01-01 PROCEDURE — 83605 ASSAY OF LACTIC ACID: CPT

## 2019-01-01 PROCEDURE — 36415 COLL VENOUS BLD VENIPUNCTURE: CPT

## 2019-01-01 PROCEDURE — 82140 ASSAY OF AMMONIA: CPT

## 2019-01-01 PROCEDURE — D9220A PRA ANESTHESIA: ICD-10-PCS | Mod: CRNA,,, | Performed by: NURSE ANESTHETIST, CERTIFIED REGISTERED

## 2019-01-01 PROCEDURE — 36000707: Performed by: SURGERY

## 2019-01-01 PROCEDURE — 99223 PR INITIAL HOSPITAL CARE,LEVL III: ICD-10-PCS | Mod: AI,57,, | Performed by: SURGERY

## 2019-01-01 PROCEDURE — 99233 SBSQ HOSP IP/OBS HIGH 50: CPT | Mod: 24,,, | Performed by: SURGERY

## 2019-01-01 PROCEDURE — 20000000 HC ICU ROOM

## 2019-01-01 PROCEDURE — 99024 POSTOP FOLLOW-UP VISIT: CPT | Mod: ,,, | Performed by: SURGERY

## 2019-01-01 PROCEDURE — 97116 GAIT TRAINING THERAPY: CPT

## 2019-01-01 PROCEDURE — 85730 THROMBOPLASTIN TIME PARTIAL: CPT | Mod: 91

## 2019-01-01 PROCEDURE — S0028 INJECTION, FAMOTIDINE, 20 MG: HCPCS | Performed by: STUDENT IN AN ORGANIZED HEALTH CARE EDUCATION/TRAINING PROGRAM

## 2019-01-01 PROCEDURE — 88307 TISSUE SPECIMEN TO PATHOLOGY - SURGERY: ICD-10-PCS | Mod: 26,,, | Performed by: PATHOLOGY

## 2019-01-01 PROCEDURE — 94640 AIRWAY INHALATION TREATMENT: CPT

## 2019-01-01 PROCEDURE — D9220A PRA ANESTHESIA: Mod: CRNA,,, | Performed by: NURSE ANESTHETIST, CERTIFIED REGISTERED

## 2019-01-01 PROCEDURE — 99233 PR SUBSEQUENT HOSPITAL CARE,LEVL III: ICD-10-PCS | Mod: ,,, | Performed by: INTERNAL MEDICINE

## 2019-01-01 PROCEDURE — 85610 PROTHROMBIN TIME: CPT

## 2019-01-01 PROCEDURE — 99900026 HC AIRWAY MAINTENANCE (STAT)

## 2019-01-01 PROCEDURE — C9113 INJ PANTOPRAZOLE SODIUM, VIA: HCPCS | Performed by: STUDENT IN AN ORGANIZED HEALTH CARE EDUCATION/TRAINING PROGRAM

## 2019-01-01 PROCEDURE — 27201012 HC FORCEPS, HOT/COLD, DISP: Performed by: INTERNAL MEDICINE

## 2019-01-01 PROCEDURE — 85025 COMPLETE CBC W/AUTO DIFF WBC: CPT | Mod: 91

## 2019-01-01 PROCEDURE — 27100171 HC OXYGEN HIGH FLOW UP TO 24 HOURS

## 2019-01-01 PROCEDURE — 99900035 HC TECH TIME PER 15 MIN (STAT)

## 2019-01-01 PROCEDURE — 83615 LACTATE (LD) (LDH) ENZYME: CPT

## 2019-01-01 PROCEDURE — 37000009 HC ANESTHESIA EA ADD 15 MINS: Performed by: SURGERY

## 2019-01-01 PROCEDURE — 84100 ASSAY OF PHOSPHORUS: CPT

## 2019-01-01 PROCEDURE — 97535 SELF CARE MNGMENT TRAINING: CPT

## 2019-01-01 PROCEDURE — 83605 ASSAY OF LACTIC ACID: CPT | Mod: 91

## 2019-01-01 PROCEDURE — 99223 PR INITIAL HOSPITAL CARE,LEVL III: ICD-10-PCS | Mod: GC,,, | Performed by: INTERNAL MEDICINE

## 2019-01-01 PROCEDURE — 85384 FIBRINOGEN ACTIVITY: CPT

## 2019-01-01 PROCEDURE — 94761 N-INVAS EAR/PLS OXIMETRY MLT: CPT

## 2019-01-01 PROCEDURE — 27000221 HC OXYGEN, UP TO 24 HOURS

## 2019-01-01 PROCEDURE — 97530 THERAPEUTIC ACTIVITIES: CPT

## 2019-01-01 PROCEDURE — 84295 ASSAY OF SERUM SODIUM: CPT

## 2019-01-01 PROCEDURE — 36000706: Performed by: SURGERY

## 2019-01-01 PROCEDURE — 83735 ASSAY OF MAGNESIUM: CPT | Mod: 91

## 2019-01-01 PROCEDURE — 92526 ORAL FUNCTION THERAPY: CPT

## 2019-01-01 PROCEDURE — 37000009 HC ANESTHESIA EA ADD 15 MINS: Performed by: INTERNAL MEDICINE

## 2019-01-01 PROCEDURE — 99497 PR ADVNCD CARE PLAN 30 MIN: ICD-10-PCS | Mod: ,,, | Performed by: CLINICAL NURSE SPECIALIST

## 2019-01-01 PROCEDURE — 36600 WITHDRAWAL OF ARTERIAL BLOOD: CPT

## 2019-01-01 PROCEDURE — 97608 NEG PRS WND THER NDME>50SQCM: CPT

## 2019-01-01 PROCEDURE — P9017 PLASMA 1 DONOR FRZ W/IN 8 HR: HCPCS

## 2019-01-01 PROCEDURE — 88307 TISSUE EXAM BY PATHOLOGIST: CPT | Mod: 26,,, | Performed by: PATHOLOGY

## 2019-01-01 PROCEDURE — 83735 ASSAY OF MAGNESIUM: CPT

## 2019-01-01 PROCEDURE — 63600175 PHARM REV CODE 636 W HCPCS

## 2019-01-01 PROCEDURE — P9021 RED BLOOD CELLS UNIT: HCPCS

## 2019-01-01 PROCEDURE — 85384 FIBRINOGEN ACTIVITY: CPT | Mod: 91

## 2019-01-01 PROCEDURE — 99291 PR CRITICAL CARE, E/M 30-74 MINUTES: ICD-10-PCS | Mod: GC,,, | Performed by: SURGERY

## 2019-01-01 PROCEDURE — 88341 TISSUE SPECIMEN TO PATHOLOGY - SURGERY: ICD-10-PCS | Mod: 26,,, | Performed by: PATHOLOGY

## 2019-01-01 PROCEDURE — D9220A PRA ANESTHESIA: ICD-10-PCS | Mod: ANES,,, | Performed by: ANESTHESIOLOGY

## 2019-01-01 PROCEDURE — 27200966 HC CLOSED SUCTION SYSTEM

## 2019-01-01 PROCEDURE — 36430 TRANSFUSION BLD/BLD COMPNT: CPT

## 2019-01-01 PROCEDURE — 99497 ADVNCD CARE PLAN 30 MIN: CPT | Mod: ,,, | Performed by: CLINICAL NURSE SPECIALIST

## 2019-01-01 PROCEDURE — 20600001 HC STEP DOWN PRIVATE ROOM

## 2019-01-01 PROCEDURE — 99233 SBSQ HOSP IP/OBS HIGH 50: CPT | Mod: 24,GC,, | Performed by: SURGERY

## 2019-01-01 PROCEDURE — 80048 BASIC METABOLIC PNL TOTAL CA: CPT

## 2019-01-01 PROCEDURE — 88342 IMHCHEM/IMCYTCHM 1ST ANTB: CPT | Performed by: PATHOLOGY

## 2019-01-01 PROCEDURE — 25000003 PHARM REV CODE 250

## 2019-01-01 PROCEDURE — 94668 MNPJ CHEST WALL SBSQ: CPT

## 2019-01-01 PROCEDURE — 27201423 OPTIME MED/SURG SUP & DEVICES STERILE SUPPLY: Performed by: SURGERY

## 2019-01-01 PROCEDURE — 44120 REMOVAL OF SMALL INTESTINE: CPT | Mod: GC,,, | Performed by: SURGERY

## 2019-01-01 PROCEDURE — 99223 1ST HOSP IP/OBS HIGH 75: CPT | Mod: 24,,, | Performed by: SURGERY

## 2019-01-01 PROCEDURE — 99291 PR CRITICAL CARE, E/M 30-74 MINUTES: ICD-10-PCS | Mod: ,,, | Performed by: SURGERY

## 2019-01-01 PROCEDURE — 88305 TISSUE EXAM BY PATHOLOGIST: CPT | Mod: 26,,, | Performed by: PATHOLOGY

## 2019-01-01 PROCEDURE — 86850 RBC ANTIBODY SCREEN: CPT

## 2019-01-01 PROCEDURE — 88341 IMHCHEM/IMCYTCHM EA ADD ANTB: CPT | Mod: 26,,, | Performed by: PATHOLOGY

## 2019-01-01 PROCEDURE — 99233 PR SUBSEQUENT HOSPITAL CARE,LEVL III: ICD-10-PCS | Mod: 24,GC,, | Performed by: SURGERY

## 2019-01-01 PROCEDURE — 82140 ASSAY OF AMMONIA: CPT | Mod: 91

## 2019-01-01 PROCEDURE — 86965 POOLING BLOOD PLATELETS: CPT

## 2019-01-01 PROCEDURE — 84100 ASSAY OF PHOSPHORUS: CPT | Mod: 91

## 2019-01-01 PROCEDURE — 84550 ASSAY OF BLOOD/URIC ACID: CPT

## 2019-01-01 PROCEDURE — 88365 TISSUE SPECIMEN TO PATHOLOGY - SURGERY: ICD-10-PCS | Mod: 26,,, | Performed by: PATHOLOGY

## 2019-01-01 PROCEDURE — 99223 PR INITIAL HOSPITAL CARE,LEVL III: ICD-10-PCS | Mod: 24,,, | Performed by: SURGERY

## 2019-01-01 PROCEDURE — 99024 PR POST-OP FOLLOW-UP VISIT: ICD-10-PCS | Mod: ,,, | Performed by: SURGERY

## 2019-01-01 PROCEDURE — 25000242 PHARM REV CODE 250 ALT 637 W/ HCPCS: Performed by: STUDENT IN AN ORGANIZED HEALTH CARE EDUCATION/TRAINING PROGRAM

## 2019-01-01 PROCEDURE — 43239 PR EGD, FLEX, W/BIOPSY, SGL/MULTI: ICD-10-PCS | Mod: GC,,, | Performed by: INTERNAL MEDICINE

## 2019-01-01 PROCEDURE — 99291 CRITICAL CARE FIRST HOUR: CPT | Mod: GC,,, | Performed by: SURGERY

## 2019-01-01 PROCEDURE — 99223 1ST HOSP IP/OBS HIGH 75: CPT | Mod: 25,GC,, | Performed by: INTERNAL MEDICINE

## 2019-01-01 PROCEDURE — 99233 SBSQ HOSP IP/OBS HIGH 50: CPT | Mod: ,,, | Performed by: CLINICAL NURSE SPECIALIST

## 2019-01-01 PROCEDURE — 31500 INSERT EMERGENCY AIRWAY: CPT

## 2019-01-01 PROCEDURE — 99232 SBSQ HOSP IP/OBS MODERATE 35: CPT | Mod: 24,GC,, | Performed by: SURGERY

## 2019-01-01 PROCEDURE — 82803 BLOOD GASES ANY COMBINATION: CPT

## 2019-01-01 PROCEDURE — 88305 TISSUE EXAM BY PATHOLOGIST: CPT | Performed by: PATHOLOGY

## 2019-01-01 PROCEDURE — 86920 COMPATIBILITY TEST SPIN: CPT

## 2019-01-01 PROCEDURE — 88342 IMHCHEM/IMCYTCHM 1ST ANTB: CPT | Mod: 26,,, | Performed by: PATHOLOGY

## 2019-01-01 PROCEDURE — 31720 CLEARANCE OF AIRWAYS: CPT

## 2019-01-01 PROCEDURE — D9220A PRA ANESTHESIA: Mod: ANES,,, | Performed by: ANESTHESIOLOGY

## 2019-01-01 PROCEDURE — 44120 PR RESECT SMALL INTEST,SINGL RESEC/ANAS: ICD-10-PCS | Mod: GC,,, | Performed by: SURGERY

## 2019-01-01 PROCEDURE — 43239 EGD BIOPSY SINGLE/MULTIPLE: CPT | Mod: GC,,, | Performed by: INTERNAL MEDICINE

## 2019-01-01 PROCEDURE — 99233 SBSQ HOSP IP/OBS HIGH 50: CPT | Mod: ,,, | Performed by: INTERNAL MEDICINE

## 2019-01-01 PROCEDURE — 25000003 PHARM REV CODE 250: Performed by: NURSE ANESTHETIST, CERTIFIED REGISTERED

## 2019-01-01 PROCEDURE — 97803 MED NUTRITION INDIV SUBSEQ: CPT

## 2019-01-01 PROCEDURE — 97110 THERAPEUTIC EXERCISES: CPT

## 2019-01-01 PROCEDURE — 80053 COMPREHEN METABOLIC PANEL: CPT | Mod: 91

## 2019-01-01 PROCEDURE — 99223 1ST HOSP IP/OBS HIGH 75: CPT | Mod: GC,,, | Performed by: INTERNAL MEDICINE

## 2019-01-01 PROCEDURE — P9035 PLATELET PHERES LEUKOREDUCED: HCPCS

## 2019-01-01 PROCEDURE — 37000008 HC ANESTHESIA 1ST 15 MINUTES: Performed by: INTERNAL MEDICINE

## 2019-01-01 PROCEDURE — S5010 5% DEXTROSE AND 0.45% SALINE: HCPCS | Performed by: STUDENT IN AN ORGANIZED HEALTH CARE EDUCATION/TRAINING PROGRAM

## 2019-01-01 PROCEDURE — C1751 CATH, INF, PER/CENT/MIDLINE: HCPCS

## 2019-01-01 PROCEDURE — 97161 PT EVAL LOW COMPLEX 20 MIN: CPT

## 2019-01-01 PROCEDURE — 76937 US GUIDE VASCULAR ACCESS: CPT

## 2019-01-01 PROCEDURE — 94003 VENT MGMT INPAT SUBQ DAY: CPT

## 2019-01-01 PROCEDURE — 88365 INSITU HYBRIDIZATION (FISH): CPT | Mod: 26,,, | Performed by: PATHOLOGY

## 2019-01-01 PROCEDURE — 88342 TISSUE SPECIMEN TO PATHOLOGY - SURGERY: ICD-10-PCS | Mod: 26,,, | Performed by: PATHOLOGY

## 2019-01-01 PROCEDURE — 99233 PR SUBSEQUENT HOSPITAL CARE,LEVL III: ICD-10-PCS | Mod: 24,,, | Performed by: SURGERY

## 2019-01-01 PROCEDURE — 92610 EVALUATE SWALLOWING FUNCTION: CPT

## 2019-01-01 PROCEDURE — 86901 BLOOD TYPING SEROLOGIC RH(D): CPT

## 2019-01-01 PROCEDURE — 43239 EGD BIOPSY SINGLE/MULTIPLE: CPT | Performed by: INTERNAL MEDICINE

## 2019-01-01 PROCEDURE — 99233 PR SUBSEQUENT HOSPITAL CARE,LEVL III: ICD-10-PCS | Mod: ,,, | Performed by: CLINICAL NURSE SPECIALIST

## 2019-01-01 PROCEDURE — 97802 MEDICAL NUTRITION INDIV IN: CPT

## 2019-01-01 PROCEDURE — 97165 OT EVAL LOW COMPLEX 30 MIN: CPT

## 2019-01-01 PROCEDURE — 63600175 PHARM REV CODE 636 W HCPCS: Performed by: HOSPITALIST

## 2019-01-01 PROCEDURE — 99223 PR INITIAL HOSPITAL CARE,LEVL III: ICD-10-PCS | Mod: 25,GC,, | Performed by: INTERNAL MEDICINE

## 2019-01-01 PROCEDURE — 37000008 HC ANESTHESIA 1ST 15 MINUTES: Performed by: SURGERY

## 2019-01-01 PROCEDURE — 88305 TISSUE SPECIMEN TO PATHOLOGY - SURGERY: ICD-10-PCS | Mod: 26,,, | Performed by: PATHOLOGY

## 2019-01-01 PROCEDURE — 36410 VNPNXR 3YR/> PHY/QHP DX/THER: CPT

## 2019-01-01 PROCEDURE — 94002 VENT MGMT INPAT INIT DAY: CPT

## 2019-01-01 PROCEDURE — 99223 1ST HOSP IP/OBS HIGH 75: CPT | Mod: AI,57,, | Performed by: SURGERY

## 2019-01-01 PROCEDURE — 27201038 HC PROBE, BI-POLAR: Performed by: INTERNAL MEDICINE

## 2019-01-01 RX ORDER — KETAMINE HCL IN 0.9 % NACL 50 MG/5 ML
SYRINGE (ML) INTRAVENOUS
Status: DISCONTINUED | OUTPATIENT
Start: 2019-01-01 | End: 2019-01-01

## 2019-01-01 RX ORDER — POTASSIUM CHLORIDE 7.45 MG/ML
60 INJECTION INTRAVENOUS
Status: DISCONTINUED | OUTPATIENT
Start: 2019-01-01 | End: 2019-01-01

## 2019-01-01 RX ORDER — SUCCINYLCHOLINE CHLORIDE 20 MG/ML
INJECTION INTRAMUSCULAR; INTRAVENOUS
Status: DISCONTINUED | OUTPATIENT
Start: 2019-01-01 | End: 2019-01-01

## 2019-01-01 RX ORDER — MIDAZOLAM HYDROCHLORIDE 1 MG/ML
INJECTION INTRAMUSCULAR; INTRAVENOUS
Status: COMPLETED
Start: 2019-01-01 | End: 2019-01-01

## 2019-01-01 RX ORDER — ROCURONIUM BROMIDE 10 MG/ML
INJECTION, SOLUTION INTRAVENOUS
Status: COMPLETED
Start: 2019-01-01 | End: 2019-01-01

## 2019-01-01 RX ORDER — RIFAXIMIN 550 MG/1
TABLET ORAL
Qty: 60 TABLET | Refills: 0 | Status: SHIPPED | OUTPATIENT
Start: 2019-01-01 | End: 2019-01-01 | Stop reason: SDUPTHER

## 2019-01-01 RX ORDER — MIDAZOLAM HYDROCHLORIDE 1 MG/ML
INJECTION, SOLUTION INTRAMUSCULAR; INTRAVENOUS
Status: DISCONTINUED | OUTPATIENT
Start: 2019-01-01 | End: 2019-01-01

## 2019-01-01 RX ORDER — PROPOFOL 10 MG/ML
VIAL (ML) INTRAVENOUS
Status: DISCONTINUED | OUTPATIENT
Start: 2019-01-01 | End: 2019-01-01

## 2019-01-01 RX ORDER — PROPOFOL 10 MG/ML
INJECTION, EMULSION INTRAVENOUS
Status: COMPLETED
Start: 2019-01-01 | End: 2019-01-01

## 2019-01-01 RX ORDER — OXYCODONE HYDROCHLORIDE 5 MG/1
5 TABLET ORAL EVERY 4 HOURS PRN
Status: DISCONTINUED | OUTPATIENT
Start: 2019-01-01 | End: 2019-01-01

## 2019-01-01 RX ORDER — FLUCONAZOLE 2 MG/ML
400 INJECTION, SOLUTION INTRAVENOUS
Status: DISCONTINUED | OUTPATIENT
Start: 2019-01-01 | End: 2019-01-01

## 2019-01-01 RX ORDER — GLUCAGON 1 MG
1 KIT INJECTION
Status: DISCONTINUED | OUTPATIENT
Start: 2019-01-01 | End: 2019-01-01 | Stop reason: HOSPADM

## 2019-01-01 RX ORDER — PANTOPRAZOLE SODIUM 40 MG/10ML
80 INJECTION, POWDER, LYOPHILIZED, FOR SOLUTION INTRAVENOUS ONCE
Status: COMPLETED | OUTPATIENT
Start: 2019-01-01 | End: 2019-01-01

## 2019-01-01 RX ORDER — FENTANYL CITRATE 50 UG/ML
INJECTION, SOLUTION INTRAMUSCULAR; INTRAVENOUS
Status: COMPLETED
Start: 2019-01-01 | End: 2019-01-01

## 2019-01-01 RX ORDER — ONDANSETRON 2 MG/ML
INJECTION INTRAMUSCULAR; INTRAVENOUS
Status: DISCONTINUED | OUTPATIENT
Start: 2019-01-01 | End: 2019-01-01

## 2019-01-01 RX ORDER — ALLOPURINOL 300 MG/1
300 TABLET ORAL DAILY
Status: DISCONTINUED | OUTPATIENT
Start: 2019-01-01 | End: 2019-01-01

## 2019-01-01 RX ORDER — FUROSEMIDE 10 MG/ML
60 INJECTION INTRAMUSCULAR; INTRAVENOUS ONCE
Status: COMPLETED | OUTPATIENT
Start: 2019-01-01 | End: 2019-01-01

## 2019-01-01 RX ORDER — HYDROCODONE BITARTRATE AND ACETAMINOPHEN 500; 5 MG/1; MG/1
TABLET ORAL
Status: DISCONTINUED | OUTPATIENT
Start: 2019-01-01 | End: 2019-01-01 | Stop reason: HOSPADM

## 2019-01-01 RX ORDER — PANTOPRAZOLE SODIUM 40 MG/1
TABLET, DELAYED RELEASE ORAL
Qty: 90 TABLET | Refills: 0 | Status: SHIPPED | OUTPATIENT
Start: 2019-01-01 | End: 2019-01-01 | Stop reason: SDUPTHER

## 2019-01-01 RX ORDER — POTASSIUM CHLORIDE 7.45 MG/ML
40 INJECTION INTRAVENOUS
Status: DISCONTINUED | OUTPATIENT
Start: 2019-01-01 | End: 2019-01-01

## 2019-01-01 RX ORDER — LIDOCAINE HCL/PF 100 MG/5ML
SYRINGE (ML) INTRAVENOUS
Status: DISCONTINUED | OUTPATIENT
Start: 2019-01-01 | End: 2019-01-01

## 2019-01-01 RX ORDER — HYDROCODONE BITARTRATE AND ACETAMINOPHEN 500; 5 MG/1; MG/1
TABLET ORAL
Status: DISCONTINUED | OUTPATIENT
Start: 2019-01-01 | End: 2019-01-01

## 2019-01-01 RX ORDER — SODIUM CHLORIDE 9 MG/ML
INJECTION, SOLUTION INTRAVENOUS CONTINUOUS PRN
Status: DISCONTINUED | OUTPATIENT
Start: 2019-01-01 | End: 2019-01-01

## 2019-01-01 RX ORDER — AZATHIOPRINE 50 MG/1
TABLET ORAL
Qty: 30 TABLET | Refills: 0 | Status: SHIPPED | OUTPATIENT
Start: 2019-01-01 | End: 2019-01-01 | Stop reason: SDUPTHER

## 2019-01-01 RX ORDER — OXYCODONE AND ACETAMINOPHEN 5; 325 MG/1; MG/1
1 TABLET ORAL ONCE
Status: COMPLETED | OUTPATIENT
Start: 2019-01-01 | End: 2019-01-01

## 2019-01-01 RX ORDER — FUROSEMIDE 10 MG/ML
40 INJECTION INTRAMUSCULAR; INTRAVENOUS ONCE
Status: COMPLETED | OUTPATIENT
Start: 2019-01-01 | End: 2019-01-01

## 2019-01-01 RX ORDER — POTASSIUM CHLORIDE 7.45 MG/ML
80 INJECTION INTRAVENOUS
Status: DISCONTINUED | OUTPATIENT
Start: 2019-01-01 | End: 2019-01-01

## 2019-01-01 RX ORDER — MORPHINE SULFATE 2 MG/ML
2 INJECTION, SOLUTION INTRAMUSCULAR; INTRAVENOUS EVERY 4 HOURS PRN
Status: DISCONTINUED | OUTPATIENT
Start: 2019-01-01 | End: 2019-01-01

## 2019-01-01 RX ORDER — PROPOFOL 10 MG/ML
10 INJECTION, EMULSION INTRAVENOUS CONTINUOUS
Status: DISCONTINUED | OUTPATIENT
Start: 2019-01-01 | End: 2019-01-01

## 2019-01-01 RX ORDER — AZATHIOPRINE 50 MG/1
TABLET ORAL
Qty: 30 TABLET | Refills: 0 | Status: SHIPPED | OUTPATIENT
Start: 2019-01-01 | End: 2019-01-01

## 2019-01-01 RX ORDER — FENTANYL CITRATE 50 UG/ML
INJECTION, SOLUTION INTRAMUSCULAR; INTRAVENOUS
Status: DISCONTINUED | OUTPATIENT
Start: 2019-01-01 | End: 2019-01-01

## 2019-01-01 RX ORDER — DOCUSATE SODIUM 100 MG/1
100 CAPSULE, LIQUID FILLED ORAL DAILY
Status: DISCONTINUED | OUTPATIENT
Start: 2019-01-01 | End: 2019-01-01

## 2019-01-01 RX ORDER — NOREPINEPHRINE BITARTRATE/D5W 4MG/250ML
0.02 PLASTIC BAG, INJECTION (ML) INTRAVENOUS CONTINUOUS
Status: DISCONTINUED | OUTPATIENT
Start: 2019-01-01 | End: 2019-01-01

## 2019-01-01 RX ORDER — HALOPERIDOL 5 MG/ML
0.5 INJECTION INTRAMUSCULAR ONCE
Status: COMPLETED | OUTPATIENT
Start: 2019-01-01 | End: 2019-01-01

## 2019-01-01 RX ORDER — FAMOTIDINE 10 MG/ML
20 INJECTION INTRAVENOUS EVERY 12 HOURS
Status: DISCONTINUED | OUTPATIENT
Start: 2019-01-01 | End: 2019-01-01

## 2019-01-01 RX ORDER — MORPHINE SULFATE 2 MG/ML
1 INJECTION, SOLUTION INTRAMUSCULAR; INTRAVENOUS
Status: DISCONTINUED | OUTPATIENT
Start: 2019-01-01 | End: 2019-01-01 | Stop reason: HOSPADM

## 2019-01-01 RX ORDER — LIDOCAINE 50 MG/G
OINTMENT TOPICAL
Status: DISCONTINUED | OUTPATIENT
Start: 2019-01-01 | End: 2019-01-01 | Stop reason: HOSPADM

## 2019-01-01 RX ORDER — MORPHINE SULFATE 2 MG/ML
4 INJECTION, SOLUTION INTRAMUSCULAR; INTRAVENOUS EVERY 4 HOURS PRN
Status: DISCONTINUED | OUTPATIENT
Start: 2019-01-01 | End: 2019-01-01

## 2019-01-01 RX ORDER — IPRATROPIUM BROMIDE AND ALBUTEROL SULFATE 2.5; .5 MG/3ML; MG/3ML
3 SOLUTION RESPIRATORY (INHALATION) ONCE
Status: COMPLETED | OUTPATIENT
Start: 2019-01-01 | End: 2019-01-01

## 2019-01-01 RX ORDER — ONDANSETRON 2 MG/ML
4 INJECTION INTRAMUSCULAR; INTRAVENOUS EVERY 8 HOURS PRN
Status: DISCONTINUED | OUTPATIENT
Start: 2019-01-01 | End: 2019-01-01

## 2019-01-01 RX ORDER — ENOXAPARIN SODIUM 100 MG/ML
40 INJECTION SUBCUTANEOUS EVERY 24 HOURS
Status: DISCONTINUED | OUTPATIENT
Start: 2019-01-01 | End: 2019-01-01

## 2019-01-01 RX ORDER — OXYCODONE HYDROCHLORIDE 10 MG/1
10 TABLET ORAL EVERY 4 HOURS PRN
Status: DISCONTINUED | OUTPATIENT
Start: 2019-01-01 | End: 2019-01-01

## 2019-01-01 RX ORDER — DEXTROSE MONOHYDRATE 50 MG/ML
INJECTION, SOLUTION INTRAVENOUS CONTINUOUS
Status: DISCONTINUED | OUTPATIENT
Start: 2019-01-01 | End: 2019-01-01

## 2019-01-01 RX ORDER — HYDROMORPHONE HYDROCHLORIDE 1 MG/ML
0.2 INJECTION, SOLUTION INTRAMUSCULAR; INTRAVENOUS; SUBCUTANEOUS ONCE
Status: COMPLETED | OUTPATIENT
Start: 2019-01-01 | End: 2019-01-01

## 2019-01-01 RX ORDER — ROCURONIUM BROMIDE 10 MG/ML
INJECTION, SOLUTION INTRAVENOUS
Status: DISCONTINUED | OUTPATIENT
Start: 2019-01-01 | End: 2019-01-01

## 2019-01-01 RX ORDER — ONDANSETRON 2 MG/ML
4 INJECTION INTRAMUSCULAR; INTRAVENOUS EVERY 4 HOURS PRN
Status: DISCONTINUED | OUTPATIENT
Start: 2019-01-01 | End: 2019-01-01 | Stop reason: HOSPADM

## 2019-01-01 RX ORDER — MAGNESIUM SULFATE HEPTAHYDRATE 40 MG/ML
2 INJECTION, SOLUTION INTRAVENOUS
Status: DISCONTINUED | OUTPATIENT
Start: 2019-01-01 | End: 2019-01-01

## 2019-01-01 RX ORDER — AZATHIOPRINE 50 MG/1
TABLET ORAL
Qty: 30 TABLET | Refills: 0 | Status: ON HOLD | OUTPATIENT
Start: 2019-01-01 | End: 2019-01-01

## 2019-01-01 RX ORDER — PANTOPRAZOLE SODIUM 40 MG/10ML
40 INJECTION, POWDER, LYOPHILIZED, FOR SOLUTION INTRAVENOUS 2 TIMES DAILY
Status: DISCONTINUED | OUTPATIENT
Start: 2019-01-01 | End: 2019-01-01

## 2019-01-01 RX ORDER — PANTOPRAZOLE SODIUM 40 MG/1
TABLET, DELAYED RELEASE ORAL
Qty: 90 TABLET | Refills: 0 | Status: ON HOLD | OUTPATIENT
Start: 2019-01-01 | End: 2019-01-01

## 2019-01-01 RX ORDER — GLYCOPYRROLATE 1 MG/1
1 TABLET ORAL 3 TIMES DAILY PRN
Status: DISCONTINUED | OUTPATIENT
Start: 2019-01-01 | End: 2019-01-01 | Stop reason: HOSPADM

## 2019-01-01 RX ORDER — ALLOPURINOL 300 MG/1
300 TABLET ORAL 2 TIMES DAILY
Status: CANCELLED | OUTPATIENT
Start: 2019-01-01

## 2019-01-01 RX ORDER — FAMOTIDINE 10 MG/ML
20 INJECTION INTRAVENOUS 2 TIMES DAILY
Status: DISCONTINUED | OUTPATIENT
Start: 2019-01-01 | End: 2019-01-01

## 2019-01-01 RX ORDER — GLYCOPYRROLATE 0.2 MG/ML
INJECTION INTRAMUSCULAR; INTRAVENOUS
Status: DISCONTINUED | OUTPATIENT
Start: 2019-01-01 | End: 2019-01-01

## 2019-01-01 RX ORDER — DOCUSATE SODIUM 50 MG/5ML
100 LIQUID ORAL DAILY
Status: DISCONTINUED | OUTPATIENT
Start: 2019-01-01 | End: 2019-01-01

## 2019-01-01 RX ORDER — FAMOTIDINE 20 MG/1
20 TABLET, FILM COATED ORAL 2 TIMES DAILY
Status: DISCONTINUED | OUTPATIENT
Start: 2019-01-01 | End: 2019-01-01

## 2019-01-01 RX ORDER — PHENYLEPHRINE HCL IN 0.9% NACL 1 MG/10 ML
SYRINGE (ML) INTRAVENOUS
Status: COMPLETED
Start: 2019-01-01 | End: 2019-01-01

## 2019-01-01 RX ORDER — OCTREOTIDE ACETATE 100 UG/ML
50 INJECTION, SOLUTION INTRAVENOUS; SUBCUTANEOUS ONCE
Status: COMPLETED | OUTPATIENT
Start: 2019-01-01 | End: 2019-01-01

## 2019-01-01 RX ORDER — DEXAMETHASONE SODIUM PHOSPHATE 4 MG/ML
INJECTION, SOLUTION INTRA-ARTICULAR; INTRALESIONAL; INTRAMUSCULAR; INTRAVENOUS; SOFT TISSUE
Status: DISCONTINUED | OUTPATIENT
Start: 2019-01-01 | End: 2019-01-01

## 2019-01-01 RX ORDER — POTASSIUM CHLORIDE 7.45 MG/ML
20 INJECTION INTRAVENOUS
Status: DISCONTINUED | OUTPATIENT
Start: 2019-01-01 | End: 2019-01-01

## 2019-01-01 RX ORDER — LORAZEPAM 2 MG/ML
1 INJECTION INTRAMUSCULAR EVERY 30 MIN PRN
Status: DISCONTINUED | OUTPATIENT
Start: 2019-01-01 | End: 2019-01-01 | Stop reason: HOSPADM

## 2019-01-01 RX ORDER — SODIUM CHLORIDE, SODIUM LACTATE, POTASSIUM CHLORIDE, CALCIUM CHLORIDE 600; 310; 30; 20 MG/100ML; MG/100ML; MG/100ML; MG/100ML
INJECTION, SOLUTION INTRAVENOUS CONTINUOUS
Status: DISCONTINUED | OUTPATIENT
Start: 2019-01-01 | End: 2019-01-01

## 2019-01-01 RX ORDER — POLYETHYLENE GLYCOL 3350 17 G/17G
17 POWDER, FOR SOLUTION ORAL DAILY
Status: DISCONTINUED | OUTPATIENT
Start: 2019-01-01 | End: 2019-01-01

## 2019-01-01 RX ORDER — MIDAZOLAM HYDROCHLORIDE 1 MG/ML
3 INJECTION INTRAMUSCULAR; INTRAVENOUS ONCE
Status: COMPLETED | OUTPATIENT
Start: 2019-01-01 | End: 2019-01-01

## 2019-01-01 RX ORDER — RIFAXIMIN 550 MG/1
TABLET ORAL
Qty: 60 TABLET | Refills: 0 | Status: ON HOLD | OUTPATIENT
Start: 2019-01-01 | End: 2019-01-01

## 2019-01-01 RX ORDER — CHLORHEXIDINE GLUCONATE ORAL RINSE 1.2 MG/ML
15 SOLUTION DENTAL 2 TIMES DAILY
Status: DISCONTINUED | OUTPATIENT
Start: 2019-01-01 | End: 2019-01-01

## 2019-01-01 RX ORDER — FENTANYL CITRATE 50 UG/ML
50 INJECTION, SOLUTION INTRAMUSCULAR; INTRAVENOUS ONCE
Status: COMPLETED | OUTPATIENT
Start: 2019-01-01 | End: 2019-01-01

## 2019-01-01 RX ORDER — ONDANSETRON 8 MG/1
8 TABLET, ORALLY DISINTEGRATING ORAL EVERY 8 HOURS PRN
Status: DISCONTINUED | OUTPATIENT
Start: 2019-01-01 | End: 2019-01-01

## 2019-01-01 RX ORDER — LACTULOSE 10 G/15ML
200 SOLUTION ORAL; RECTAL 3 TIMES DAILY
Status: DISCONTINUED | OUTPATIENT
Start: 2019-01-01 | End: 2019-01-01

## 2019-01-01 RX ORDER — MAGNESIUM SULFATE HEPTAHYDRATE 40 MG/ML
4 INJECTION, SOLUTION INTRAVENOUS
Status: DISCONTINUED | OUTPATIENT
Start: 2019-01-01 | End: 2019-01-01

## 2019-01-01 RX ORDER — CEFTRIAXONE 1 G/1
1 INJECTION, POWDER, FOR SOLUTION INTRAMUSCULAR; INTRAVENOUS
Status: COMPLETED | OUTPATIENT
Start: 2019-01-01 | End: 2019-01-01

## 2019-01-01 RX ORDER — FLUCONAZOLE 2 MG/ML
400 INJECTION, SOLUTION INTRAVENOUS ONCE
Status: COMPLETED | OUTPATIENT
Start: 2019-01-01 | End: 2019-01-01

## 2019-01-01 RX ORDER — OXYCODONE HYDROCHLORIDE 5 MG/1
5 TABLET ORAL EVERY 6 HOURS PRN
Status: DISCONTINUED | OUTPATIENT
Start: 2019-01-01 | End: 2019-01-01

## 2019-01-01 RX ORDER — INSULIN ASPART 100 [IU]/ML
0-5 INJECTION, SOLUTION INTRAVENOUS; SUBCUTANEOUS EVERY 6 HOURS PRN
Status: DISCONTINUED | OUTPATIENT
Start: 2019-01-01 | End: 2019-01-01 | Stop reason: HOSPADM

## 2019-01-01 RX ORDER — CEFTRIAXONE 2 G/50ML
2 INJECTION, SOLUTION INTRAVENOUS
Status: DISCONTINUED | OUTPATIENT
Start: 2019-01-01 | End: 2019-01-01

## 2019-01-01 RX ORDER — IPRATROPIUM BROMIDE AND ALBUTEROL SULFATE 2.5; .5 MG/3ML; MG/3ML
3 SOLUTION RESPIRATORY (INHALATION)
Status: DISCONTINUED | OUTPATIENT
Start: 2019-01-01 | End: 2019-01-01

## 2019-01-01 RX ORDER — ONDANSETRON 2 MG/ML
4 INJECTION INTRAMUSCULAR; INTRAVENOUS EVERY 6 HOURS PRN
Status: DISCONTINUED | OUTPATIENT
Start: 2019-01-01 | End: 2019-01-01 | Stop reason: HOSPADM

## 2019-01-01 RX ORDER — SODIUM CHLORIDE 0.9 % (FLUSH) 0.9 %
10 SYRINGE (ML) INJECTION
Status: DISCONTINUED | OUTPATIENT
Start: 2019-01-01 | End: 2019-01-01

## 2019-01-01 RX ORDER — POLYETHYLENE GLYCOL 3350 17 G/17G
17 POWDER, FOR SOLUTION ORAL 2 TIMES DAILY
Status: DISCONTINUED | OUTPATIENT
Start: 2019-01-01 | End: 2019-01-01

## 2019-01-01 RX ORDER — SODIUM CHLORIDE 9 MG/ML
INJECTION, SOLUTION INTRAVENOUS CONTINUOUS
Status: DISCONTINUED | OUTPATIENT
Start: 2019-01-01 | End: 2019-01-01

## 2019-01-01 RX ORDER — PROPOFOL 10 MG/ML
INJECTION, EMULSION INTRAVENOUS
Status: DISPENSED
Start: 2019-01-01 | End: 2019-01-01

## 2019-01-01 RX ORDER — LEVOTHYROXINE SODIUM ANHYDROUS 100 UG/5ML
88 INJECTION, POWDER, LYOPHILIZED, FOR SOLUTION INTRAVENOUS DAILY
Status: DISCONTINUED | OUTPATIENT
Start: 2019-01-01 | End: 2019-01-01

## 2019-01-01 RX ORDER — MIDAZOLAM HYDROCHLORIDE 1 MG/ML
1 INJECTION INTRAMUSCULAR; INTRAVENOUS ONCE
Status: COMPLETED | OUTPATIENT
Start: 2019-01-01 | End: 2019-01-01

## 2019-01-01 RX ORDER — LABETALOL HCL 20 MG/4 ML
10 SYRINGE (ML) INTRAVENOUS ONCE
Status: COMPLETED | OUTPATIENT
Start: 2019-01-01 | End: 2019-01-01

## 2019-01-01 RX ORDER — MORPHINE SULFATE 1 MG/ML
0.5 INJECTION, SOLUTION INTRAVENOUS CONTINUOUS
Status: DISCONTINUED | OUTPATIENT
Start: 2019-01-01 | End: 2019-01-01 | Stop reason: HOSPADM

## 2019-01-01 RX ORDER — LACTULOSE 10 G/15ML
20 SOLUTION ORAL 3 TIMES DAILY
Status: DISCONTINUED | OUTPATIENT
Start: 2019-01-01 | End: 2019-01-01

## 2019-01-01 RX ORDER — DEXTROSE MONOHYDRATE 50 MG/ML
INJECTION, SOLUTION INTRAVENOUS CONTINUOUS
Status: ACTIVE | OUTPATIENT
Start: 2019-01-01 | End: 2019-01-01

## 2019-01-01 RX ORDER — PHENYLEPHRINE HCL IN 0.9% NACL 1 MG/10 ML
100 SYRINGE (ML) INTRAVENOUS ONCE
Status: COMPLETED | OUTPATIENT
Start: 2019-01-01 | End: 2019-01-01

## 2019-01-01 RX ORDER — DEXTROSE MONOHYDRATE AND SODIUM CHLORIDE 5; .45 G/100ML; G/100ML
INJECTION, SOLUTION INTRAVENOUS CONTINUOUS
Status: DISCONTINUED | OUTPATIENT
Start: 2019-01-01 | End: 2019-01-01

## 2019-01-01 RX ORDER — SODIUM CHLORIDE 0.9 % (FLUSH) 0.9 %
10 SYRINGE (ML) INJECTION
Status: DISCONTINUED | OUTPATIENT
Start: 2019-01-01 | End: 2019-01-01 | Stop reason: HOSPADM

## 2019-01-01 RX ADMIN — Medication 0.06 MCG/KG/MIN: at 03:09

## 2019-01-01 RX ADMIN — SODIUM CHLORIDE: 0.9 INJECTION, SOLUTION INTRAVENOUS at 09:09

## 2019-01-01 RX ADMIN — DEXTROSE 8 MG/HR: 50 INJECTION, SOLUTION INTRAVENOUS at 04:09

## 2019-01-01 RX ADMIN — LACTULOSE 20 G: 20 SOLUTION ORAL at 09:09

## 2019-01-01 RX ADMIN — LACTULOSE 200 G: 10 SOLUTION ORAL at 03:09

## 2019-01-01 RX ADMIN — IPRATROPIUM BROMIDE AND ALBUTEROL SULFATE 3 ML: .5; 3 SOLUTION RESPIRATORY (INHALATION) at 04:09

## 2019-01-01 RX ADMIN — SODIUM CHLORIDE, SODIUM LACTATE, POTASSIUM CHLORIDE, AND CALCIUM CHLORIDE: 600; 310; 30; 20 INJECTION, SOLUTION INTRAVENOUS at 06:09

## 2019-01-01 RX ADMIN — PIPERACILLIN SODIUM AND TAZOBACTAM SODIUM 4.5 G: 4; .5 INJECTION, POWDER, LYOPHILIZED, FOR SOLUTION INTRAVENOUS at 05:09

## 2019-01-01 RX ADMIN — MORPHINE SULFATE 0.5 MG/HR: 1 INJECTION, SOLUTION INTRAVENOUS at 09:09

## 2019-01-01 RX ADMIN — PROPOFOL: 10 INJECTION, EMULSION INTRAVENOUS at 07:09

## 2019-01-01 RX ADMIN — LEVOTHYROXINE SODIUM ANHYDROUS 88 MCG: 100 INJECTION, POWDER, LYOPHILIZED, FOR SOLUTION INTRAVENOUS at 09:09

## 2019-01-01 RX ADMIN — FAMOTIDINE 20 MG: 10 INJECTION, SOLUTION INTRAVENOUS at 09:09

## 2019-01-01 RX ADMIN — PIPERACILLIN SODIUM AND TAZOBACTAM SODIUM 4.5 G: 4; .5 INJECTION, POWDER, LYOPHILIZED, FOR SOLUTION INTRAVENOUS at 01:09

## 2019-01-01 RX ADMIN — FAMOTIDINE 20 MG: 10 INJECTION INTRAVENOUS at 08:09

## 2019-01-01 RX ADMIN — RIFAXIMIN 550 MG: 550 TABLET ORAL at 08:09

## 2019-01-01 RX ADMIN — LIDOCAINE HYDROCHLORIDE 75 MG: 20 INJECTION, SOLUTION INTRAVENOUS at 05:09

## 2019-01-01 RX ADMIN — SODIUM CHLORIDE, SODIUM LACTATE, POTASSIUM CHLORIDE, AND CALCIUM CHLORIDE: 600; 310; 30; 20 INJECTION, SOLUTION INTRAVENOUS at 05:09

## 2019-01-01 RX ADMIN — LABETALOL HCL IV SOLN PREFILLED SYRINGE 20 MG/4ML (5 MG/ML) 10 MG: 20/4 SOLUTION PREFILLED SYRINGE at 01:09

## 2019-01-01 RX ADMIN — FLUCONAZOLE 400 MG: 2 INJECTION, SOLUTION INTRAVENOUS at 04:09

## 2019-01-01 RX ADMIN — SODIUM CHLORIDE, SODIUM LACTATE, POTASSIUM CHLORIDE, AND CALCIUM CHLORIDE: 600; 310; 30; 20 INJECTION, SOLUTION INTRAVENOUS at 01:09

## 2019-01-01 RX ADMIN — LACTULOSE 200 G: 10 SOLUTION ORAL at 09:09

## 2019-01-01 RX ADMIN — PANTOPRAZOLE SODIUM 80 MG: 40 INJECTION, POWDER, FOR SOLUTION INTRAVENOUS at 10:09

## 2019-01-01 RX ADMIN — IPRATROPIUM BROMIDE AND ALBUTEROL SULFATE 3 ML: .5; 3 SOLUTION RESPIRATORY (INHALATION) at 03:09

## 2019-01-01 RX ADMIN — OXYCODONE HYDROCHLORIDE AND ACETAMINOPHEN 1 TABLET: 5; 325 TABLET ORAL at 09:09

## 2019-01-01 RX ADMIN — FENTANYL CITRATE 25 MCG: 50 INJECTION, SOLUTION INTRAMUSCULAR; INTRAVENOUS at 06:09

## 2019-01-01 RX ADMIN — ROCURONIUM BROMIDE 50 MG: 10 INJECTION, SOLUTION INTRAVENOUS at 07:09

## 2019-01-01 RX ADMIN — FAMOTIDINE 20 MG: 10 INJECTION INTRAVENOUS at 11:09

## 2019-01-01 RX ADMIN — IPRATROPIUM BROMIDE AND ALBUTEROL SULFATE 3 ML: .5; 3 SOLUTION RESPIRATORY (INHALATION) at 08:09

## 2019-01-01 RX ADMIN — PIPERACILLIN SODIUM AND TAZOBACTAM SODIUM 4.5 G: 4; .5 INJECTION, POWDER, LYOPHILIZED, FOR SOLUTION INTRAVENOUS at 09:09

## 2019-01-01 RX ADMIN — MIDAZOLAM HYDROCHLORIDE 3 MG: 1 INJECTION INTRAMUSCULAR; INTRAVENOUS at 07:09

## 2019-01-01 RX ADMIN — IPRATROPIUM BROMIDE AND ALBUTEROL SULFATE 3 ML: .5; 3 SOLUTION RESPIRATORY (INHALATION) at 07:09

## 2019-01-01 RX ADMIN — FENTANYL CITRATE 50 MCG: 50 INJECTION, SOLUTION INTRAMUSCULAR; INTRAVENOUS at 07:09

## 2019-01-01 RX ADMIN — HALOPERIDOL LACTATE 0.5 MG: 5 INJECTION INTRAMUSCULAR at 12:09

## 2019-01-01 RX ADMIN — PROPOFOL 180 MG: 10 INJECTION, EMULSION INTRAVENOUS at 05:09

## 2019-01-01 RX ADMIN — IPRATROPIUM BROMIDE AND ALBUTEROL SULFATE 3 ML: .5; 3 SOLUTION RESPIRATORY (INHALATION) at 11:09

## 2019-01-01 RX ADMIN — FENTANYL CITRATE 25 MCG: 50 INJECTION, SOLUTION INTRAMUSCULAR; INTRAVENOUS at 07:09

## 2019-01-01 RX ADMIN — LACTULOSE 200 G: 10 SOLUTION ORAL at 12:09

## 2019-01-01 RX ADMIN — PIPERACILLIN SODIUM AND TAZOBACTAM SODIUM 4.5 G: 4; .5 INJECTION, POWDER, LYOPHILIZED, FOR SOLUTION INTRAVENOUS at 08:09

## 2019-01-01 RX ADMIN — LEVOTHYROXINE SODIUM ANHYDROUS 88 MCG: 100 INJECTION, POWDER, LYOPHILIZED, FOR SOLUTION INTRAVENOUS at 08:09

## 2019-01-01 RX ADMIN — MORPHINE SULFATE 1 MG/HR: 1 INJECTION, SOLUTION INTRAVENOUS at 01:09

## 2019-01-01 RX ADMIN — DEXTROSE: 5 SOLUTION INTRAVENOUS at 10:09

## 2019-01-01 RX ADMIN — CHLORHEXIDINE GLUCONATE 0.12% ORAL RINSE 15 ML: 1.2 LIQUID ORAL at 08:09

## 2019-01-01 RX ADMIN — GLYCOPYRROLATE 0.2 MG: 0.2 INJECTION, SOLUTION INTRAMUSCULAR; INTRAVENOUS at 02:09

## 2019-01-01 RX ADMIN — RIFAXIMIN 550 MG: 550 TABLET ORAL at 09:09

## 2019-01-01 RX ADMIN — DEXTROSE 8 MG/HR: 50 INJECTION, SOLUTION INTRAVENOUS at 10:09

## 2019-01-01 RX ADMIN — LIDOCAINE HYDROCHLORIDE 100 MG: 20 INJECTION, SOLUTION INTRAVENOUS at 02:09

## 2019-01-01 RX ADMIN — PANTOPRAZOLE SODIUM 40 MG: 40 INJECTION, POWDER, FOR SOLUTION INTRAVENOUS at 09:09

## 2019-01-01 RX ADMIN — SODIUM CHLORIDE: 0.9 INJECTION, SOLUTION INTRAVENOUS at 02:09

## 2019-01-01 RX ADMIN — MORPHINE SULFATE 0.75 MG/HR: 1 INJECTION, SOLUTION INTRAVENOUS at 10:09

## 2019-01-01 RX ADMIN — PROPOFOL 30 MG: 10 INJECTION, EMULSION INTRAVENOUS at 02:09

## 2019-01-01 RX ADMIN — MIDAZOLAM HYDROCHLORIDE 2 MG: 1 INJECTION, SOLUTION INTRAMUSCULAR; INTRAVENOUS at 05:09

## 2019-01-01 RX ADMIN — FAMOTIDINE 20 MG: 20 TABLET, FILM COATED ORAL at 08:09

## 2019-01-01 RX ADMIN — OCTREOTIDE ACETATE 50 MCG/HR: 1000 INJECTION, SOLUTION INTRAVENOUS; SUBCUTANEOUS at 10:09

## 2019-01-01 RX ADMIN — PIPERACILLIN SODIUM AND TAZOBACTAM SODIUM 4.5 G: 4; .5 INJECTION, POWDER, LYOPHILIZED, FOR SOLUTION INTRAVENOUS at 04:09

## 2019-01-01 RX ADMIN — MIDAZOLAM HYDROCHLORIDE 1 MG: 1 INJECTION, SOLUTION INTRAMUSCULAR; INTRAVENOUS at 07:09

## 2019-01-01 RX ADMIN — PROPOFOL 40 MG: 10 INJECTION, EMULSION INTRAVENOUS at 02:09

## 2019-01-01 RX ADMIN — LACTULOSE 200 G: 10 SOLUTION ORAL at 04:09

## 2019-01-01 RX ADMIN — POLYETHYLENE GLYCOL 3350 17 G: 17 POWDER, FOR SOLUTION ORAL at 09:09

## 2019-01-01 RX ADMIN — SODIUM CHLORIDE, SODIUM LACTATE, POTASSIUM CHLORIDE, AND CALCIUM CHLORIDE 1000 ML: .6; .31; .03; .02 INJECTION, SOLUTION INTRAVENOUS at 07:09

## 2019-01-01 RX ADMIN — FENTANYL CITRATE 50 MCG: 50 INJECTION, SOLUTION INTRAMUSCULAR; INTRAVENOUS at 05:09

## 2019-01-01 RX ADMIN — LEVOTHYROXINE SODIUM 175 MCG: 125 TABLET ORAL at 10:09

## 2019-01-01 RX ADMIN — DEXTROSE: 5 SOLUTION INTRAVENOUS at 06:09

## 2019-01-01 RX ADMIN — CEFTRIAXONE SODIUM 1 G: 1 INJECTION, POWDER, FOR SOLUTION INTRAMUSCULAR; INTRAVENOUS at 09:09

## 2019-01-01 RX ADMIN — DEXTROSE 8 MG/HR: 50 INJECTION, SOLUTION INTRAVENOUS at 03:09

## 2019-01-01 RX ADMIN — SODIUM CHLORIDE, SODIUM LACTATE, POTASSIUM CHLORIDE, AND CALCIUM CHLORIDE 500 ML: .6; .31; .03; .02 INJECTION, SOLUTION INTRAVENOUS at 01:09

## 2019-01-01 RX ADMIN — MORPHINE SULFATE 1.25 MG/HR: 1 INJECTION, SOLUTION INTRAVENOUS at 05:09

## 2019-01-01 RX ADMIN — MORPHINE SULFATE 4 MG: 2 INJECTION, SOLUTION INTRAMUSCULAR; INTRAVENOUS at 11:09

## 2019-01-01 RX ADMIN — SUCCINYLCHOLINE CHLORIDE 180 MG: 20 INJECTION, SOLUTION INTRAMUSCULAR; INTRAVENOUS at 05:09

## 2019-01-01 RX ADMIN — CHLORHEXIDINE GLUCONATE 0.12% ORAL RINSE 15 ML: 1.2 LIQUID ORAL at 09:09

## 2019-01-01 RX ADMIN — DEXTROSE AND SODIUM CHLORIDE: 5; .45 INJECTION, SOLUTION INTRAVENOUS at 08:09

## 2019-01-01 RX ADMIN — Medication 0.02 MCG/KG/MIN: at 02:09

## 2019-01-01 RX ADMIN — ROCURONIUM BROMIDE 30 MG: 10 INJECTION, SOLUTION INTRAVENOUS at 06:09

## 2019-01-01 RX ADMIN — MORPHINE SULFATE 4 MG: 2 INJECTION, SOLUTION INTRAMUSCULAR; INTRAVENOUS at 12:09

## 2019-01-01 RX ADMIN — FAMOTIDINE 20 MG: 20 TABLET, FILM COATED ORAL at 09:09

## 2019-01-01 RX ADMIN — DEXTROSE: 5 SOLUTION INTRAVENOUS at 02:09

## 2019-01-01 RX ADMIN — MORPHINE SULFATE 2 MG: 2 INJECTION, SOLUTION INTRAMUSCULAR; INTRAVENOUS at 11:09

## 2019-01-01 RX ADMIN — OCTREOTIDE ACETATE 50 MCG/HR: 1000 INJECTION, SOLUTION INTRAVENOUS; SUBCUTANEOUS at 09:09

## 2019-01-01 RX ADMIN — SODIUM CHLORIDE, SODIUM LACTATE, POTASSIUM CHLORIDE, AND CALCIUM CHLORIDE 1000 ML: .6; .31; .03; .02 INJECTION, SOLUTION INTRAVENOUS at 01:09

## 2019-01-01 RX ADMIN — SODIUM CHLORIDE, SODIUM LACTATE, POTASSIUM CHLORIDE, AND CALCIUM CHLORIDE 500 ML: .6; .31; .03; .02 INJECTION, SOLUTION INTRAVENOUS at 10:09

## 2019-01-01 RX ADMIN — HYDROMORPHONE HYDROCHLORIDE 0.2 MG: 1 INJECTION, SOLUTION INTRAMUSCULAR; INTRAVENOUS; SUBCUTANEOUS at 11:09

## 2019-01-01 RX ADMIN — SODIUM CHLORIDE: 0.9 INJECTION, SOLUTION INTRAVENOUS at 11:09

## 2019-01-01 RX ADMIN — SODIUM CHLORIDE, SODIUM LACTATE, POTASSIUM CHLORIDE, AND CALCIUM CHLORIDE 2000 ML: .6; .31; .03; .02 INJECTION, SOLUTION INTRAVENOUS at 04:09

## 2019-01-01 RX ADMIN — LEVOTHYROXINE SODIUM 175 MCG: 125 TABLET ORAL at 09:09

## 2019-01-01 RX ADMIN — PROPOFOL 10 MCG/KG/MIN: 10 INJECTION, EMULSION INTRAVENOUS at 08:09

## 2019-01-01 RX ADMIN — ALLOPURINOL 300 MG: 300 TABLET ORAL at 08:09

## 2019-01-01 RX ADMIN — DEXTROSE: 5 SOLUTION INTRAVENOUS at 08:09

## 2019-01-01 RX ADMIN — FLUCONAZOLE 400 MG: 2 INJECTION, SOLUTION INTRAVENOUS at 05:09

## 2019-01-01 RX ADMIN — FLUCONAZOLE 400 MG: 2 INJECTION, SOLUTION INTRAVENOUS at 11:09

## 2019-01-01 RX ADMIN — LACTULOSE 20 G: 20 SOLUTION ORAL at 01:09

## 2019-01-01 RX ADMIN — MORPHINE SULFATE 2 MG: 2 INJECTION, SOLUTION INTRAMUSCULAR; INTRAVENOUS at 08:09

## 2019-01-01 RX ADMIN — IPRATROPIUM BROMIDE AND ALBUTEROL SULFATE 3 ML: .5; 3 SOLUTION RESPIRATORY (INHALATION) at 09:09

## 2019-01-01 RX ADMIN — PIPERACILLIN SODIUM AND TAZOBACTAM SODIUM 4.5 G: 4; .5 INJECTION, POWDER, LYOPHILIZED, FOR SOLUTION INTRAVENOUS at 12:09

## 2019-01-01 RX ADMIN — SODIUM CHLORIDE, SODIUM LACTATE, POTASSIUM CHLORIDE, AND CALCIUM CHLORIDE: .6; .31; .03; .02 INJECTION, SOLUTION INTRAVENOUS at 02:09

## 2019-01-01 RX ADMIN — Medication 1 MG: at 07:09

## 2019-01-01 RX ADMIN — CEFTRIAXONE 2 G: 2 INJECTION, SOLUTION INTRAVENOUS at 10:09

## 2019-01-01 RX ADMIN — FAMOTIDINE 20 MG: 10 INJECTION INTRAVENOUS at 09:09

## 2019-01-01 RX ADMIN — SODIUM CHLORIDE, SODIUM LACTATE, POTASSIUM CHLORIDE, AND CALCIUM CHLORIDE 1000 ML: .6; .31; .03; .02 INJECTION, SOLUTION INTRAVENOUS at 05:09

## 2019-01-01 RX ADMIN — LACTULOSE 20 G: 20 SOLUTION ORAL at 03:09

## 2019-01-01 RX ADMIN — PANTOPRAZOLE SODIUM 40 MG: 40 INJECTION, POWDER, FOR SOLUTION INTRAVENOUS at 08:09

## 2019-01-01 RX ADMIN — MORPHINE SULFATE 4 MG: 2 INJECTION, SOLUTION INTRAMUSCULAR; INTRAVENOUS at 08:09

## 2019-01-01 RX ADMIN — FUROSEMIDE 60 MG: 10 INJECTION, SOLUTION INTRAMUSCULAR; INTRAVENOUS at 08:09

## 2019-01-01 RX ADMIN — CHLORHEXIDINE GLUCONATE 0.12% ORAL RINSE 15 ML: 1.2 LIQUID ORAL at 10:09

## 2019-01-01 RX ADMIN — SODIUM CHLORIDE, SODIUM LACTATE, POTASSIUM CHLORIDE, AND CALCIUM CHLORIDE: 600; 310; 30; 20 INJECTION, SOLUTION INTRAVENOUS at 02:09

## 2019-01-01 RX ADMIN — FUROSEMIDE 40 MG: 10 INJECTION, SOLUTION INTRAMUSCULAR; INTRAVENOUS at 11:09

## 2019-01-01 RX ADMIN — MORPHINE SULFATE 4 MG: 2 INJECTION, SOLUTION INTRAMUSCULAR; INTRAVENOUS at 04:09

## 2019-01-01 RX ADMIN — CEFTRIAXONE SODIUM 1 G: 1 INJECTION, POWDER, FOR SOLUTION INTRAMUSCULAR; INTRAVENOUS at 10:09

## 2019-01-01 RX ADMIN — ROCURONIUM BROMIDE 10 MG: 10 INJECTION, SOLUTION INTRAVENOUS at 05:09

## 2019-01-01 RX ADMIN — ALLOPURINOL 300 MG: 300 TABLET ORAL at 02:09

## 2019-01-01 RX ADMIN — LACTULOSE 200 G: 10 SOLUTION ORAL at 10:09

## 2019-01-01 RX ADMIN — LACTULOSE 200 G: 10 SOLUTION ORAL at 08:09

## 2019-01-01 RX ADMIN — LEVOTHYROXINE SODIUM ANHYDROUS 88 MCG: 100 INJECTION, POWDER, LYOPHILIZED, FOR SOLUTION INTRAVENOUS at 10:09

## 2019-01-01 RX ADMIN — FENTANYL CITRATE 50 MCG: 50 INJECTION INTRAMUSCULAR; INTRAVENOUS at 07:09

## 2019-01-01 RX ADMIN — GLYCOPYRROLATE 1 MG: 1 TABLET ORAL at 02:09

## 2019-01-01 RX ADMIN — SODIUM CHLORIDE 1000 ML: 0.9 INJECTION, SOLUTION INTRAVENOUS at 01:09

## 2019-01-01 RX ADMIN — FAMOTIDINE 20 MG: 10 INJECTION, SOLUTION INTRAVENOUS at 08:09

## 2019-01-01 RX ADMIN — MORPHINE SULFATE 2 MG: 2 INJECTION, SOLUTION INTRAMUSCULAR; INTRAVENOUS at 05:09

## 2019-01-01 RX ADMIN — DEXAMETHASONE SODIUM PHOSPHATE 4 MG: 4 INJECTION, SOLUTION INTRAMUSCULAR; INTRAVENOUS at 05:09

## 2019-01-01 RX ADMIN — LEVOTHYROXINE SODIUM 175 MCG: 125 TABLET ORAL at 12:09

## 2019-01-01 RX ADMIN — ONDANSETRON 4 MG: 2 INJECTION INTRAMUSCULAR; INTRAVENOUS at 06:09

## 2019-01-01 RX ADMIN — LACTULOSE 200 G: 10 SOLUTION ORAL at 05:09

## 2019-01-01 RX ADMIN — ALLOPURINOL 300 MG: 300 TABLET ORAL at 09:09

## 2019-01-01 RX ADMIN — MORPHINE SULFATE 2 MG: 2 INJECTION, SOLUTION INTRAMUSCULAR; INTRAVENOUS at 09:09

## 2019-01-01 RX ADMIN — SODIUM CHLORIDE, SODIUM LACTATE, POTASSIUM CHLORIDE, AND CALCIUM CHLORIDE 500 ML: .6; .31; .03; .02 INJECTION, SOLUTION INTRAVENOUS at 11:09

## 2019-01-01 RX ADMIN — MORPHINE SULFATE 2 MG: 2 INJECTION, SOLUTION INTRAMUSCULAR; INTRAVENOUS at 02:09

## 2019-01-01 RX ADMIN — FLUCONAZOLE 400 MG: 2 INJECTION, SOLUTION INTRAVENOUS at 03:09

## 2019-01-01 RX ADMIN — GLYCOPYRROLATE 1 MG: 1 TABLET ORAL at 10:09

## 2019-01-01 RX ADMIN — Medication 30 MG: at 05:09

## 2019-01-01 RX ADMIN — LACTULOSE 20 G: 20 SOLUTION ORAL at 02:09

## 2019-01-01 RX ADMIN — OCTREOTIDE ACETATE 50 MCG/HR: 1000 INJECTION, SOLUTION INTRAVENOUS; SUBCUTANEOUS at 05:09

## 2019-01-01 RX ADMIN — MAGNESIUM SULFATE IN WATER 2 G: 40 INJECTION, SOLUTION INTRAVENOUS at 11:09

## 2019-01-01 RX ADMIN — SODIUM CHLORIDE, SODIUM LACTATE, POTASSIUM CHLORIDE, AND CALCIUM CHLORIDE: 600; 310; 30; 20 INJECTION, SOLUTION INTRAVENOUS at 10:09

## 2019-01-01 RX ADMIN — SODIUM CHLORIDE, SODIUM GLUCONATE, SODIUM ACETATE, POTASSIUM CHLORIDE, MAGNESIUM CHLORIDE, SODIUM PHOSPHATE, DIBASIC, AND POTASSIUM PHOSPHATE: .53; .5; .37; .037; .03; .012; .00082 INJECTION, SOLUTION INTRAVENOUS at 05:09

## 2019-01-01 RX ADMIN — LEVOTHYROXINE SODIUM 175 MCG: 25 TABLET ORAL at 09:09

## 2019-01-01 RX ADMIN — MIDAZOLAM HYDROCHLORIDE 3 MG: 1 INJECTION, SOLUTION INTRAMUSCULAR; INTRAVENOUS at 07:09

## 2019-01-01 RX ADMIN — OCTREOTIDE ACETATE 50 MCG: 100 INJECTION, SOLUTION INTRAVENOUS; SUBCUTANEOUS at 10:09

## 2019-01-01 RX ADMIN — PROPOFOL 10 MG: 10 INJECTION, EMULSION INTRAVENOUS at 02:09

## 2019-01-01 RX ADMIN — LACTULOSE 20 G: 20 SOLUTION ORAL at 08:09

## 2019-01-01 RX ADMIN — MORPHINE SULFATE 1 MG: 2 INJECTION, SOLUTION INTRAMUSCULAR; INTRAVENOUS at 01:09

## 2019-01-01 RX ADMIN — SODIUM CHLORIDE, SODIUM LACTATE, POTASSIUM CHLORIDE, AND CALCIUM CHLORIDE: .6; .31; .03; .02 INJECTION, SOLUTION INTRAVENOUS at 08:09

## 2019-01-01 RX ADMIN — MORPHINE SULFATE 4 MG: 2 INJECTION, SOLUTION INTRAMUSCULAR; INTRAVENOUS at 06:09

## 2019-04-01 PROBLEM — R10.13 EPIGASTRIC PAIN: Status: ACTIVE | Noted: 2019-01-01

## 2019-04-08 NOTE — TELEPHONE ENCOUNTER
----- Message from Pao Barbosa sent at 4/8/2019 10:38 AM CDT -----  Contact: Pt  Former pt of [facility] she was last seen on 2017 would like to schedule an appt with any provider that is here taking [facility] pts for an check up appt     # [phone]    Thanks

## 2019-09-12 PROBLEM — K63.1 PERFORATION OF INTESTINE: Status: ACTIVE | Noted: 2019-01-01

## 2019-09-13 PROBLEM — K25.5 GASTRIC PERFORATION: Status: ACTIVE | Noted: 2019-01-01

## 2019-09-13 NOTE — PLAN OF CARE
09/13/19 1509   Post-Acute Status   Post-Acute Authorization   (needs to be determined)   This SW in communication with  and medical team. SW will continue to follow for discharge needs and offer support as needed.    Needs to be determined.    Dinah Burgos LMSW  [facility]  [id number]

## 2019-09-13 NOTE — PLAN OF CARE
Patient lives in a ground floor apartment w/her disabled Mother. Daughter & sister, Lacie, at . They state other family members are assisting caring for their mother at home. Hospital plan & D/c needs are to be determined.     CM discussed CM role, together w/JENY, CAROLYN Burgos, in that we follow the patient, while she is here in the hospital, & assist w/discharge needs. They verbalized their understanding.     [facility] Packet given to daughter after informed about it;Daughter verbalized her understanding.       09/13/19 1325   Discharge Assessment   Assessment Type Discharge Planning Assessment   Confirmed/corrected address and phone number on facesheet? Yes   Assessment information obtained from? Medical Record;Other  (daughter-Maddie)   Expected Length of Stay (days)   (TBD-In ICU now)   Communicated expected length of stay with patient/caregiver no   Prior to hospitilization cognitive status: Alert/Oriented;No Deficits   Prior to hospitalization functional status: Independent   Current cognitive status: Coma/Sedated/Intubated  (Sedated, falling asleep)   Current Functional Status: Needs Assistance;Assistive Equipment;Independent   Facility Arrived From:   ([facility] )   Lives With parent(s)  (Disabled mother)   Able to Return to Prior Arrangements other (see comments)  (Unable to determine at this time)   Is patient able to care for self after discharge? Unable to determine at this time (comments)   Who are your caregiver(s) and their phone number(s)?   (Maddie Shaw Daughter     [phone]. Has sisters, no names nor #'s given. )   Patient's perception of discharge disposition home or selfcare;home health;skilled nursing facility  (TBD)   Readmission Within the Last 30 Days unable to assess   Patient currently being followed by outpatient case management? No   Patient currently receives any other outside agency services? No   Equipment Currently Used at Home none   Do you have any problems  affording any of your prescribed medications? No   Is the patient taking medications as prescribed? yes   Does the patient have transportation home? Yes   Transportation Anticipated family or friend will provide   Dialysis Name and Scheduled days   (N/A)   Does the patient receive services at the [facility]? No   Discharge Plan A Skilled Nursing Facility;Long-term acute care facility (LTAC);Home Health;Home with family  (TBD)   Discharge Plan B Home Health;Home with family;Long-term acute care facility (LTAC);Skilled Nursing Facility  (TBD)   DME Needed Upon Discharge    (TBD)   Patient/Family in Agreement with Plan unable to assess

## 2019-09-13 NOTE — H&P
[facility]  General Surgery  History & Physical    Patient Name: Zaira Gupta  MRN: [id number]  Admission Date: 9/13/2019  Attending Physician: Zay Sánchez MD   Primary Care Provider: Minna Canchola NP    Patient information was obtained from patient, past medical records and ER records.     Subjective:   History of Present Illness:  History of Present Illness: 59 yo  female with Hx of AIH, cirrhosis with splenomegaly and low-volume ascites, portal HTN s/p TIPS (2012), reflux esophagitis (on protonix), hypothyroidism, CKD III, and morbid obesity who presents to ED with chief complaint of abdominal pain. She states she had a coughing fit at 11:00 am and approximately and hour or two later began to have intense lower abdominal pain that was so severe she couldn't walk. After the abdominal pain she developed nausea and came to the ED.    Workup revealed perforated viscus, likely stomach. Given underlying significant liver disease, patient transferred here for higher level care.     Current Facility-Administered Medications on File Prior to Encounter   Medication    [COMPLETED] 0.9%  NaCl infusion    [COMPLETED] 0.9%  NaCl infusion    [COMPLETED] 0.9%  NaCl infusion    [COMPLETED] iohexol (OMNIPAQUE 350) injection 80 mL    [COMPLETED] morphine injection 4 mg    [COMPLETED] morphine injection 6 mg    [COMPLETED] ondansetron injection 4 mg    [COMPLETED] piperacillin-tazobactam 4.5 g in dextrose 5 % 100 mL IVPB (ready to mix system)    [COMPLETED] promethazine (PHENERGAN) 12.5 mg in dextrose 5 % 50 mL IVPB    [COMPLETED] sodium chloride 0.9% bolus 1,000 mL    [COMPLETED] sodium chloride 0.9% bolus 1,000 mL    [COMPLETED] sodium chloride 0.9% bolus 1,000 mL    [COMPLETED] vancomycin 2 g in dextrose 5 % 500 mL IVPB    [DISCONTINUED] fluconazole (DIFLUCAN) IVPB 400 mg 200 mL     Current Outpatient Medications on File Prior to Encounter   Medication Sig    azaTHIOprine  (IMURAN) 50 mg Tab TAKE 1 TABLET(50 MG) BY MOUTH EVERY DAY    CHOLECALCIFEROL, VITAMIN D3, (VITAMIN D3 ORAL) Take 1,000 mg by mouth once daily.    furosemide (LASIX) 40 MG tablet TAKE 1 TABLET(40 MG) BY MOUTH EVERY DAY    levothyroxine (SYNTHROID, LEVOTHROID) 175 MCG tablet Take 1 tablet (175 mcg total) by mouth before breakfast. Wait at least 1 hour after taking LT4 to take PPI    pantoprazole (PROTONIX) 40 MG tablet TAKE 1 TABLET(40 MG) BY MOUTH EVERY DAY    spironolactone (ALDACTONE) 100 MG tablet TAKE 1 TABLET(100 MG) BY MOUTH EVERY DAY    XIFAXAN 550 mg Tab TAKE 1 TABLET(550 MG) BY MOUTH TWICE DAILY       Review of patient's allergies indicates:   Allergen Reactions    Aspirin (bulk) Other (See Comments)    Heparin analogues Other (See Comments)     Difficulty to arouse    Nsaids (non-steroidal anti-inflammatory drug)        Past Medical History:   Diagnosis Date    Acid reflux     Anemia     Arthritis     Autoimmune hepatitis 10/23/2012      Ref. Range 2012 09:38  BRANT Latest Range: Neg <1:160  Pos, Titer to follow (A)  BRANT HEP-2 Titer Latest Range: Neg <1:160 titer Pos 1:320 (A)  BRANT Hep-2 Pattern No range found Homogeneous (A)  Anti-SSA Antibody Latest Range: <20 EU 1.68  Anti-SSA Interpretation Latest Range: Negative  Negative  Anti-SSB Antibody Latest Range: <20 EU 1.76  Anti-SSB Interpretation Latest Range: Negative  Negativ    Cirrhosis     Dry mouth     Esophageal varix bleeding     Hypothyroidism     Obesity     Thrombocytopenia      Past Surgical History:   Procedure Laterality Date    BLADDER SURGERY  in      SECTION, CLASSIC      DILATION AND CURETTAGE OF UTERUS  19932 MAB    EGD (ESOPHAGOGASTRODUODENOSCOPY) N/A 11/15/2013    Performed by [facility]un [facility] at [facility] (2ND FLR)    ESOPHAGOGASTRODUODENOSCOPY (EGD) N/A 2015    Performed by Luis Bogran-Reyes, MD at [facility] ENDO    TIPS PROCEDURE      TIPS revision  2012    TONSILLECTOMY,  ADENOIDECTOMY       Family History     Problem Relation (Age of Onset)    Arthritis Mother    Diabetes Mother, Sister, Sister    Hypertension Sister        Tobacco Use    Smoking status: Former Smoker     Packs/day: 0.10     Types: Cigarettes    Smokeless tobacco: Never Used   Substance and Sexual Activity    Alcohol use: No     Alcohol/week: 0.0 oz    Drug use: No    Sexual activity: Never     Review of Systems   Gastrointestinal: Positive for abdominal pain.   All other systems reviewed and are negative.    Objective:     Vital Signs (Most Recent):  Temp: 98.8 °F (37.1 °C) (09/13/19 0400)  Pulse: 110 (09/13/19 0400)  Resp: (!) 40 (09/13/19 0400)  BP: (!) 130/58 (09/13/19 0400)  SpO2: 99 % (09/13/19 0400) Vital Signs (24h Range):  Temp:  [98.4 °F (36.9 °C)-98.8 °F (37.1 °C)] 98.8 °F (37.1 °C)  Pulse:  [] 110  Resp:  [19-40] 40  SpO2:  [95 %-100 %] 99 %  BP: ()/(45-66) 130/58     Weight: 121.3 kg (267 lb 6.7 oz)  Body mass index is 44.5 kg/m².    Physical Exam   Constitutional: She is oriented to person, place, and time. She appears distressed.   HENT:   Head: Normocephalic and atraumatic.   Cardiovascular: Normal rate.   Pulmonary/Chest: Effort normal.   Abdominal: Soft. She exhibits no distension. There is tenderness. There is guarding.   Neurological: She is alert and oriented to person, place, and time.   Psychiatric: She has a normal mood and affect. Her behavior is normal.       Significant Labs:  CBC:   Recent Labs   Lab 09/12/19 1858   WBC 4.08   RBC 2.74*   HGB 9.6*   HCT 30.8*   PLT 45*   *   MCH 35.0*   MCHC 31.2*     CMP:   Recent Labs   Lab 09/12/19 1858   GLU 89   CALCIUM 8.0*   ALBUMIN 2.3*   PROT 5.2*      K 4.1   CO2 20*      BUN 20   CREATININE 1.3   ALKPHOS 110   ALT 23   AST 53*   BILITOT 5.0*     Cardiac markers: No results for input(s): CKMB, CPKMB, TROPONINT, TROPONINI, MYOGLOBIN in the last 168 hours.    Significant Diagnostics:  I have reviewed all  pertinent imaging results/findings within the past 24 hours.    Assessment/Plan:     Active Diagnoses:    Diagnosis Date Noted POA    Gastric perforation [K25.5] 09/13/2019 Yes      Problems Resolved During this Admission:     VTE Risk Mitigation (From admission, onward)        Ordered     Place ZONIA hose  Until discontinued      09/13/19 0412     Place sequential compression device  Until discontinued      09/13/19 0412     IP VTE HIGH RISK PATIENT  Once      09/13/19 0411        OR for ex lap    Rick Cintron MD  General Surgery  [facility]

## 2019-09-13 NOTE — TRANSFER OF CARE
"Anesthesia Transfer of Care Note    Patient: Zaira Gupta    Procedure(s) Performed: Procedure(s) (LRB):  LAPAROTOMY, EXPLORATORY (N/A)    Patient location: ICU    Anesthesia Type: general    Transport from OR: Transported from OR on 6-10 L/min O2 by face mask with adequate spontaneous ventilation. Continuous ECG monitoring in transport. Continuous SpO2 monitoring in transport    Post pain: adequate analgesia    Post assessment: no apparent anesthetic complications and tolerated procedure well    Post vital signs: stable    Level of consciousness: alert, awake and oriented    Nausea/Vomiting: no nausea/vomiting    Complications: none    Transfer of care protocol was followed      Last vitals:   Visit Vitals  BP (!) 143/69   Pulse 87   Temp 36.4 °C (97.5 °F) (Oral)   Resp 20   Ht 5' 5" (1.651 m)   Wt 121.3 kg (267 lb 6.7 oz)   SpO2 97%   BMI 44.50 kg/m²     "

## 2019-09-13 NOTE — ANESTHESIA PREPROCEDURE EVALUATION
[facility]  Anesthesia Pre-Operative Evaluation         Patient Name: Zaira Gupta  YOB: 1960  MRN: [id number]    SUBJECTIVE:     Pre-operative evaluation for Procedure(s) (LRB):  LAPAROTOMY, EXPLORATORY (N/A)     09/13/2019    Zaira Gupta is a 58 y.o. female w/ a significant PMHx of obesity, autoimmune hepatitis, cirrhosis, hypothyroidism, CKD stage 3, and S/P TIPS transferred from [facility] for CT findings suggestive of intraabdominal perforation. She will be taken emergency to the operating room for exploratory laparotomy.    Patient now presents for the above procedure(s).      LDA:        Peripheral IV - Single Lumen 09/12/19 1814 20 G Left Hand (Active)   Site Assessment Clean;Dry;Intact;No redness;No swelling 9/12/2019  6:25 PM   Line Status Blood return noted;Flushed 9/12/2019  6:25 PM   Dressing Status Clean;Dry;Intact 9/12/2019  6:25 PM   Dressing Intervention New dressing 9/12/2019  6:25 PM   Number of days: 0            Peripheral IV - Single Lumen 09/13/19 0021 22 G Right Forearm (Active)   Site Assessment Clean;Dry;Intact;No redness;No swelling 9/13/2019 12:21 AM   Line Status Blood return noted;Flushed;Saline locked 9/13/2019 12:21 AM   Dressing Status Clean;Dry;Intact 9/13/2019 12:21 AM   Dressing Intervention New dressing 9/13/2019 12:21 AM   Number of days: 0            Peripheral IV - Single Lumen 09/13/19 0454 20 G Left Forearm (Active)   Number of days: 0            Urethral Catheter 09/13/19 0006  16 Fr. (Active)   Output (mL) 30 mL 9/13/2019  5:00 AM   Number of days: 0       Prev airway: None documented.    Drips:    lactated ringers 125 mL/hr at 09/13/19 0515       Patient Active Problem List   Diagnosis    Autoimmune hepatitis    Esophageal varix bleeding    S/P TIPS (transjugular intrahepatic portosystemic shunt)    Hypothyroidism    Cirrhosis    Pancytopenia    CKD (chronic kidney disease) stage 3, GFR 30-59 ml/min    Morbid obesity with  BMI of 45.0-49.9, adult    Osteoarthritis    Vitamin D deficiency    Sleep apnea    Hidradenitis suppurativa    Dental caries    Periodontal disease    Odontogenic cyst    Epigastric pain    Perforation of intestine    Gastric perforation       Review of patient's allergies indicates:   Allergen Reactions    Aspirin (bulk) Other (See Comments)    Heparin analogues Other (See Comments)     Difficulty to arouse    Nsaids (non-steroidal anti-inflammatory drug)        Current Inpatient Medications:   famotidine (PF)  20 mg Intravenous Q12H    fluconazole (DIFLUCAN) IVPB  400 mg Intravenous Once    [START ON 2019] fluconazole (DIFLUCAN) IVPB  400 mg Intravenous Q24H    lactated ringers  2,000 mL Intravenous Once    piperacillin-tazobactam (ZOSYN) IVPB  4.5 g Intravenous Q8H       No current facility-administered medications on file prior to encounter.      Current Outpatient Medications on File Prior to Encounter   Medication Sig Dispense Refill    azaTHIOprine (IMURAN) 50 mg Tab TAKE 1 TABLET(50 MG) BY MOUTH EVERY DAY 30 tablet 0    CHOLECALCIFEROL, VITAMIN D3, (VITAMIN D3 ORAL) Take 1,000 mg by mouth once daily.      furosemide (LASIX) 40 MG tablet TAKE 1 TABLET(40 MG) BY MOUTH EVERY DAY 30 tablet 6    levothyroxine (SYNTHROID, LEVOTHROID) 175 MCG tablet Take 1 tablet (175 mcg total) by mouth before breakfast. Wait at least 1 hour after taking LT4 to take PPI 90 tablet 3    pantoprazole (PROTONIX) 40 MG tablet TAKE 1 TABLET(40 MG) BY MOUTH EVERY DAY 90 tablet 0    spironolactone (ALDACTONE) 100 MG tablet TAKE 1 TABLET(100 MG) BY MOUTH EVERY DAY 90 tablet 0    XIFAXAN 550 mg Tab TAKE 1 TABLET(550 MG) BY MOUTH TWICE DAILY 60 tablet 0       Past Surgical History:   Procedure Laterality Date    BLADDER SURGERY  in      SECTION, CLASSIC      DILATION AND CURETTAGE OF UTERUS      2/2 MAB    EGD (ESOPHAGOGASTRODUODENOSCOPY) N/A 11/15/2013    Performed by [facility]un  [facility] (2ND F[facility])    ESOPHAGOGASTRODUODENOSCOPY (EGD) N/A 4/14/2015    Performed by Luis Bogran-Reyes, MD at [facility] ENDO    TIPS PROCEDURE      TIPS revision  7/5/2012    TONSILLECTOMY, ADENOIDECTOMY         Social History     Socioeconomic History    Marital status: Single     Spouse name: Not on file    Number of children: 1    Years of education: Not on file    Highest education level: Not on file   Occupational History    Not on file   Social Needs    Financial resource strain: Not on file    Food insecurity:     Worry: Not on file     Inability: Not on file    Transportation needs:     Medical: Not on file     Non-medical: Not on file   Tobacco Use    Smoking status: Former Smoker     Packs/day: 0.10     Types: Cigarettes    Smokeless tobacco: Never Used   Substance and Sexual Activity    Alcohol use: No     Alcohol/week: 0.0 oz    Drug use: No    Sexual activity: Never   Lifestyle    Physical activity:     Days per week: Not on file     Minutes per session: Not on file    Stress: Not on file   Relationships    Social connections:     Talks on phone: Not on file     Gets together: Not on file     Attends Voodoo service: Not on file     Active member of club or organization: Not on file     Attends meetings of clubs or organizations: Not on file     Relationship status: Not on file   Other Topics Concern    Not on file   Social History Narrative    Not on file       OBJECTIVE:     Vital Signs Range (Last 24H):  Temp:  [36.9 °C (98.4 °F)-37.1 °C (98.8 °F)]   Pulse:  []   Resp:  [17-40]   BP: ()/(45-66)   SpO2:  [95 %-100 %]       Significant Labs:  Lab Results   Component Value Date    WBC 4.33 09/13/2019    HGB 8.6 (L) 09/13/2019    HCT 26.4 (L) 09/13/2019    PLT 44 (L) 09/13/2019    CHOL 218 (H) 04/07/2015    TRIG 128 04/07/2015    HDL 31 (L) 04/07/2015    ALT 23 09/12/2019    AST 53 (H) 09/12/2019     09/12/2019    K 4.1 09/12/2019      09/12/2019    CREATININE 1.3 09/12/2019    BUN 20 09/12/2019    CO2 20 (L) 09/12/2019    TSH 0.021 (L) 09/04/2019    INR 1.4 (H) 09/13/2019    HGBA1C <4.0 09/04/2019       EKG:   Results for orders placed or performed during the hospital encounter of 09/12/19   EKG 12-lead    Collection Time: 09/12/19  4:32 PM    Narrative    Test Reason : R53.1,    Vent. Rate : 099 BPM     Atrial Rate : 099 BPM     P-R Int : 120 ms          QRS Dur : 078 ms      QT Int : 376 ms       P-R-T Axes : 069 099 048 degrees     QTc Int : 482 ms    Sinus rhythm with Premature atrial complexes in a pattern of bigeminy  Rightward axis  Prolonged QT  When compared with ECG of 01-APR-2019 00:35,  Premature atrial complexes are now Present    Referred By: DEBBIE ROBERTS           Confirmed By:        2D ECHO:  TTE:  No results found for this or any previous visit.      ASSESSMENT/PLAN:       Anesthesia Evaluation    I have reviewed the Patient Summary Reports.    I have reviewed the Nursing Notes.   I have reviewed the Medications.     Review of Systems  Anesthesia Hx:  No problems with previous Anesthesia  History of prior surgery of interest to airway management or planning: Previous anesthesia: General Denies Family Hx of Anesthesia complications.   Denies Personal Hx of Anesthesia complications.   Cardiovascular:   Exercise tolerance: good    Pulmonary:   Sleep Apnea    Renal/:   Chronic Renal Disease, CRI    Hepatic/GI:   GERD    Endocrine:   Hypothyroidism        Physical Exam  General:  Morbid Obesity    Airway/Jaw/Neck:  Airway Findings: Mouth Opening: Normal Tongue: Normal  General Airway Assessment: Adult  Mallampati: II  TM Distance: Normal, at least 6 cm  Jaw/Neck Findings:  Neck ROM: Normal ROM  Neck Findings:  Girth Increased      Dental:  Dental Findings: In tact   Chest/Lungs:  Chest/Lungs Findings: Clear to auscultation, Normal Respiratory Rate     Heart/Vascular:  Heart Findings: Rate: Normal  Rhythm: Regular Rhythm   Sounds: Normal        Mental Status:  Mental Status Findings:  Cooperative, Alert and Oriented         Anesthesia Plan  Type of Anesthesia, risks & benefits discussed:  Anesthesia Type:  general  Patient's Preference:   Intra-op Monitoring Plan: standard ASA monitors  Intra-op Monitoring Plan Comments:   Post Op Pain Control Plan: multimodal analgesia, IV/PO Opioids PRN and per primary service following discharge from PACU  Post Op Pain Control Plan Comments:   Induction:   IV  Beta Blocker:         Informed Consent: Patient understands risks and agrees with Anesthesia plan.  Questions answered. Anesthesia consent signed with patient.  ASA Score: 3  emergent   Day of Surgery Review of History & Physical:    H&P update referred to the surgeon.         Ready For Surgery From Anesthesia Perspective.

## 2019-09-13 NOTE — PROGRESS NOTES
Pt class A to OR. Pt connected to portable cardiac monitor and 2L O2 via NC. MIFV, LR bolus and abx infusing. Report given to OR RN.

## 2019-09-13 NOTE — PROGRESS NOTES
Report received from Anesthesia. Pt transported to SICU [id number] with portable telemetryNasal Cannula. Pt connected to ICU monitor Wall O2. SICU MD called and made aware of patient arrival. New orders received and implemented. Pt assessed, immediate needs met. Abd binder placed. All questions answered, emotional support provided.

## 2019-09-13 NOTE — H&P
[facility]  Critical Care - Surgery  Progress Note    SUBJECTIVE:     HISTORY OF PRESENT ILLNESS:  Zaira Gupta is a 58 y.o. female with PMH of obesity, autoimmune hepatitis, cirrhosis, hypothyroidism, CKD stage 3, and S/P TIPS presents to [facility] ED with a 1-day history of acute left-sided abdominal pain. She reports that she was sitting at home, eating, when all of a sudden severe pain to left side that she had not experienced previously. She reports mild nausea, no vomiting, constipation or diarrhea. She reports congestion with associated cough for the past week. She reports that she has been listed for liver transplant since 2011, MELD 19. At [facility] ED, intraperitoneal free air noted on CT imaging. She was discussed and decided needed high acuity monitoring and perioperative management due to liver disease. She was transferred for possible surgical repair of suspected perforation.         MEDICATIONS:  Home Medications:  Current Facility-Administered Medications on File Prior to Encounter   Medication Dose Route Frequency Provider Last Rate Last Dose    [COMPLETED] 0.9%  NaCl infusion  1,000 mL Intravenous ED 1 Time Marc Chacon MD   Stopped at 09/12/19 1924    [COMPLETED] 0.9%  NaCl infusion  1,000 mL Intravenous ED 1 Time Marc Chacon MD   Stopped at 09/12/19 1915    [COMPLETED] 0.9%  NaCl infusion   Intravenous ED 1 Time Homar Williamson  mL/hr at 09/13/19 0026      [COMPLETED] iohexol (OMNIPAQUE 350) injection 80 mL  80 mL Intravenous ONCE PRN Homar Williamson MD   80 mL at 09/12/19 2011    [COMPLETED] morphine injection 4 mg  4 mg Intravenous ED 1 Time Marc Chacon MD   4 mg at 09/12/19 1817    [COMPLETED] morphine injection 6 mg  6 mg Intravenous ED 1 Time Homar Williamson MD   4 mg at 09/13/19 0136    [COMPLETED] ondansetron injection 4 mg  4 mg Intravenous ED 1 Time Marc Chacon MD   4 mg at 09/12/19 1815    [COMPLETED] piperacillin-tazobactam  4.5 g in dextrose 5 % 100 mL IVPB (ready to mix system)  4.5 g Intravenous ED 1 Time Homar Williamson MD   Stopped at 09/12/19 2125    [COMPLETED] promethazine (PHENERGAN) 12.5 mg in dextrose 5 % 50 mL IVPB  12.5 mg Intravenous ED 1 Time Marc Chacon MD   Stopped at 09/12/19 1848    [COMPLETED] sodium chloride 0.9% bolus 1,000 mL  1,000 mL Intravenous ED 1 Time Homar Williamson MD   Stopped at 09/13/19 0029    [COMPLETED] sodium chloride 0.9% bolus 1,000 mL  1,000 mL Intravenous ED 1 Time Homar Williamson MD   Stopped at 09/13/19 0029    [COMPLETED] sodium chloride 0.9% bolus 1,000 mL  1,000 mL Intravenous ED 1 Time Homar Williamson  mL/hr at 09/13/19 0208 1,000 mL at 09/13/19 0208    [COMPLETED] vancomycin 2 g in dextrose 5 % 500 mL IVPB  2,000 mg Intravenous ED 1 Time Homar Williamson MD   2,000 mg at 09/13/19 0116    [DISCONTINUED] fluconazole (DIFLUCAN) IVPB 400 mg 200 mL  400 mg Intravenous Q24H Xavi Long MD   Stopped at 09/13/19 0225     Current Outpatient Medications on File Prior to Encounter   Medication Sig Dispense Refill    azaTHIOprine (IMURAN) 50 mg Tab TAKE 1 TABLET(50 MG) BY MOUTH EVERY DAY 30 tablet 0    CHOLECALCIFEROL, VITAMIN D3, (VITAMIN D3 ORAL) Take 1,000 mg by mouth once daily.      furosemide (LASIX) 40 MG tablet TAKE 1 TABLET(40 MG) BY MOUTH EVERY DAY 30 tablet 6    levothyroxine (SYNTHROID, LEVOTHROID) 175 MCG tablet Take 1 tablet (175 mcg total) by mouth before breakfast. Wait at least 1 hour after taking LT4 to take PPI 90 tablet 3    pantoprazole (PROTONIX) 40 MG tablet TAKE 1 TABLET(40 MG) BY MOUTH EVERY DAY 90 tablet 0    spironolactone (ALDACTONE) 100 MG tablet TAKE 1 TABLET(100 MG) BY MOUTH EVERY DAY 90 tablet 0    XIFAXAN 550 mg Tab TAKE 1 TABLET(550 MG) BY MOUTH TWICE DAILY 60 tablet 0       ALLERGIES:    Review of patient's allergies indicates:   Allergen Reactions    Aspirin (bulk) Other (See Comments)    Heparin analogues Other (See Comments)      Difficulty to arouse    Nsaids (non-steroidal anti-inflammatory drug)        PAST MEDICAL HISTORY:    Past Medical History:   Diagnosis Date    Acid reflux     Anemia     Arthritis     Autoimmune hepatitis 10/23/2012      Ref. Range 2012 09:38  BRANT Latest Range: Neg <1:160  Pos, Titer to follow (A)  BRANT HEP-2 Titer Latest Range: Neg <1:160 titer Pos 1:320 (A)  BRANT Hep-2 Pattern No range found Homogeneous (A)  Anti-SSA Antibody Latest Range: <20 EU 1.68  Anti-SSA Interpretation Latest Range: Negative  Negative  Anti-SSB Antibody Latest Range: <20 EU 1.76  Anti-SSB Interpretation Latest Range: Negative  Negativ    Cirrhosis     Dry mouth     Esophageal varix bleeding     Hypothyroidism     Obesity     Thrombocytopenia        SURGICAL HISTORY:  Past Surgical History:   Procedure Laterality Date    BLADDER SURGERY  in      SECTION, CLASSIC      DILATION AND CURETTAGE OF UTERUS   MAB    EGD (ESOPHAGOGASTRODUODENOSCOPY) N/A 11/15/2013    [facility]formed [facility]O (2ND FLR)    ESOPHAGOGASTRODUODENOSCOPY (EGD) N/A 2015    Performed by Luis [facility]ran-Reyes, [facility] at [facility] ENDO    TIPS PROCEDURE      TIPS revision  2012    TONSILLECTOMY, ADENOIDECTOMY         FAMILY HISTORY:  Family History   Problem Relation Age of Onset    Arthritis Mother     Diabetes Mother     Diabetes Sister     Diabetes Sister     Hypertension Sister     Breast cancer Neg Hx     Ovarian cancer Neg Hx        SOCIAL HISTORY:  Social History     Tobacco Use    Smoking status: Former Smoker     Packs/day: 0.10     Types: Cigarettes    Smokeless tobacco: Never Used   Substance Use Topics    Alcohol use: No     Alcohol/week: 0.0 oz    Drug use: No        REVIEW OF SYSTEMS:  Review of Systems   Constitutional: Negative for chills and fever.   HENT: Negative for sore throat.    Respiratory: Negative for shortness of breath.    Cardiovascular: Negative for chest pain.    Gastrointestinal: Positive for abdominal pain and nausea. Negative for diarrhea and vomiting.   Genitourinary: Negative for dysuria and flank pain.   Musculoskeletal: Negative for back pain.   Skin: Negative for rash.   Neurological: Negative for weakness.   Hematological: Does not bruise/bleed easily.          OBJECTIVE:     Most Recent Vitals:  Temp: 98.8 °F (37.1 °C) (09/13/19 0400)  Pulse: 110 (09/13/19 0400)  Resp: (!) 40 (09/13/19 0400)  BP: (!) 130/58 (09/13/19 0400)  SpO2: 99 % (09/13/19 0400)      PHYSICAL EXAM:  Nursing note and vitals reviewed.  Constitutional: She appears well-developed and well-nourished.   HENT:   Head: Normocephalic and atraumatic.   Eyes: EOM are normal. Pupils are equal, round, and reactive to light.   Neck: Neck supple.   Cardiovascular: Normal rate and regular rhythm. Exam reveals no gallop and no friction rub.    No murmur heard.  Pulmonary/Chest: Breath sounds normal. No respiratory distress.   Abdominal: There is focal left sided abdominal tenderness. There is guarding.   Tenderness to palpation left side of abdomen to periumbilical area.    Musculoskeletal: Normal range of motion. She exhibits no edema.   Neurological: She is alert and oriented to person, place, and time.   Skin: Skin is warm and dry. No rash noted.   Psychiatric: She has a normal mood and affect. Her behavior is normal. Thought content normal.          LABORATORY VALUES:  Lab Results   Component Value Date    WBC 4.08 09/12/2019    HGB 9.6 (L) 09/12/2019    HCT 30.8 (L) 09/12/2019    PLT 45 (L) 09/12/2019    HGBA1C <4.0 09/04/2019     Lab Results   Component Value Date     09/12/2019    K 4.1 09/12/2019     09/12/2019    CO2 20 (L) 09/12/2019    BUN 20 09/12/2019    CREATININE 1.3 09/12/2019    GLU 89 09/12/2019    CALCIUM 8.0 (L) 09/12/2019    PHOS 3.2 06/18/2019    AST 53 (H) 09/12/2019    ALT 23 09/12/2019    ALKPHOS 110 09/12/2019    BILITOT 5.0 (H) 09/12/2019    BILIDIR 0.5 (H) 05/01/2018     PROT 5.2 (L) 09/12/2019    ALBUMIN 2.3 (L) 09/12/2019    LIPASE 41 09/12/2019     Lab Results   Component Value Date    INR 1.4 (H) 09/12/2019     Lab Results   Component Value Date    TSH 0.021 (L) 09/04/2019    FREET4 1.87 (H) 09/04/2019    PTH 55.5 06/18/2019       DIAGNOSTIC STUDIES:  Reviewed    ASSESSMENT:     Zaira Gupta is a 58 y.o. female transferred from [facility] for CT findings suggestive of intraabdominal perforation. She will be taken emergency to the operating room tonight for exploratory laparotomy to evaluate.     Plan:    Neuro:   -Pain control prn  -A&Ox3    Pulmonary:   -Saturations >99% on 2L NC    Cardiac:  -HDS not requiring pressors     Renal:   -Weinberg in place  - since arrival   -Will monitor Bun/Cr     Fluids/Electrolytes/Nutrition/GI:   -Nutritional status: NPO  -replace lytes PRN  -maintenance fluids @ 125  - 2L bolus LR    Hematology/Oncology:  -H/H 9.6 / 30.8  -INR/Plts INR 1.4, Plt 45  -Not currently on anticoagulation     Infectious Disease:   -Afebrile  -WBC 4.08  -Cultures NGTD  -Abx: fluconazole, zosyn    Endocrine:  -Glucose goal of 120-180    Dispo:  -Continue care in the ICU setting    -Geri Samuels M.D  General Surgery PGY1

## 2019-09-13 NOTE — ANESTHESIA POSTPROCEDURE EVALUATION
Anesthesia Post Evaluation    Patient: Zaira Gupta    Procedure(s) Performed: Procedure(s) (LRB):  LAPAROTOMY, EXPLORATORY (N/A)    Final Anesthesia Type: general  Patient participation: Yes- Able to Participate  Level of consciousness: awake and alert  Post-procedure vital signs: reviewed and stable  Pain management: adequate  Airway patency: patent  PONV status at discharge: No PONV  Anesthetic complications: no      Cardiovascular status: blood pressure returned to baseline  Respiratory status: unassisted  Hydration status: euvolemic  Follow-up not needed.          Vitals Value Taken Time   /56 9/13/2019  3:02 PM   Temp 36.4 °C (97.6 °F) 9/13/2019 11:00 AM   Pulse 103 9/13/2019  3:17 PM   Resp 18 9/13/2019  3:17 PM   SpO2 96 % 9/13/2019  3:17 PM   Vitals shown include unvalidated device data.      No case tracking events are documented in the log.      Pain/Sheron Score: Pain Rating Prior to Med Admin: 6 (9/13/2019 11:07 AM)

## 2019-09-14 PROBLEM — K25.5 GASTRIC PERFORATION: Status: RESOLVED | Noted: 2019-01-01 | Resolved: 2019-01-01

## 2019-09-14 PROBLEM — K63.1 SMALL BOWEL PERFORATION: Status: ACTIVE | Noted: 2019-01-01

## 2019-09-14 NOTE — PROGRESS NOTES
Pt. OOB to chair, BP noted to have dropped. Pt. Placed back in bed. Pt. In severe pain, morphine dose administered. BP still low at this time. SICMD PAULA notified. 500cc LR bolus ordered and given. Will continue to monitor.

## 2019-09-14 NOTE — NURSING
"Dx: Gastric perforation    Shift Events: Patient's lactic trending up at 9.5 this AM. SICU team notified. 1L LR bolus being administered. Will recheck lactic after bolus finishes. UOP 30-75cc/hr. 3L NC.  No acute events during shift.      Neuro: AAO x4, Arouses to Voice, Follows Commands and Moves All Extremities    Vital Signs: BP (!) 114/52 (BP Location: Right arm, Patient Position: Lying)   Pulse (!) 113   Temp 98.2 °F (36.8 °C) (Oral)   Resp (!) 33   Ht 5' 5" (1.651 m)   Wt 126.4 kg (278 lb 10.6 oz)   SpO2 97%   BMI 46.37 kg/m²     Diet: NPO    LR @ 125    Urine Output: Urinary Catheter 445 cc/shift     Labs/Accuchecks: Daily CMP, CBC, Mag, and Phos.           "

## 2019-09-14 NOTE — SUBJECTIVE & OBJECTIVE
Interval History: Pulled NGT postop. Otherwise NAEON. Tachycardic, high lactic. VSS otherwise. Patient feels ok. No nausea/vomiting. No BM/Flatus.     Medications:  Continuous Infusions:   lactated ringers 125 mL/hr at 09/14/19 0700     Scheduled Meds:   famotidine (PF)  20 mg Intravenous Q12H    fluconazole (DIFLUCAN) IVPB  400 mg Intravenous Q24H    piperacillin-tazobactam (ZOSYN) IVPB  4.5 g Intravenous Q8H     PRN Meds:benzocaine, calcium gluconate IVPB, lidocaine, magnesium sulfate IVPB, magnesium sulfate IVPB, morphine, morphine, ondansetron, ondansetron, potassium chloride in water **AND** potassium chloride in water **AND** potassium chloride in water, potassium chloride in water **AND** potassium chloride in water **AND** potassium chloride in water, promethazine (PHENERGAN) IVPB, sodium chloride 0.9%, sodium phosphate IVPB, sodium phosphate IVPB, sodium phosphate IVPB     Review of patient's allergies indicates:   Allergen Reactions    Aspirin (bulk) Other (See Comments)    Heparin analogues Other (See Comments)     Difficulty to arouse    Nsaids (non-steroidal anti-inflammatory drug)      Objective:     Vital Signs (Most Recent):  Temp: 98.2 °F (36.8 °C) (09/14/19 0300)  Pulse: 108 (09/14/19 0600)  Resp: 18 (09/14/19 0600)  BP: (!) 111/50 (09/14/19 0600)  SpO2: 95 % (09/14/19 0600) Vital Signs (24h Range):  Temp:  [97.5 °F (36.4 °C)-98.2 °F (36.8 °C)] 98.2 °F (36.8 °C)  Pulse:  [] 108  Resp:  [14-33] 18  SpO2:  [89 %-98 %] 95 %  BP: (109-193)/(49-74) 111/50     Weight: 126.4 kg (278 lb 10.6 oz)  Body mass index is 46.37 kg/m².    Intake/Output - Last 3 Shifts       09/12 0700 - 09/13 0659 09/13 0700 - 09/14 0659 09/14 0700 - 09/15 0659    I.V. (mL/kg)  3560 (28.2)     IV Piggyback 2000 1000     Total Intake(mL/kg) 2000 (16.5) 4560 (36.1)     Urine (mL/kg/hr) 205 985 (0.3) 50 (0.8)    Total Output 205 985 50    Net +1795 +3575 -50                 Physical Exam   Constitutional: She is oriented  to person, place, and time. No distress.   HENT:   Head: Normocephalic and atraumatic.   Cardiovascular: Normal rate.   Pulmonary/Chest: Effort normal.   Abdominal: Soft. She exhibits no distension. There is tenderness (attp). There is no guarding.   Neurological: She is alert and oriented to person, place, and time.   Psychiatric: She has a normal mood and affect. Her behavior is normal.       Significant Labs:  CBC:   Recent Labs   Lab 09/14/19 0329   WBC 5.39   RBC 2.29*   HGB 8.1*   HCT 25.6*   PLT 59*   *   MCH 35.4*   MCHC 31.6*     CMP:   Recent Labs   Lab 09/14/19 0329   GLU 85   CALCIUM 7.6*   ALBUMIN 1.8*   PROT 4.5*      K 4.9   CO2 14*   *   BUN 38*   CREATININE 1.7*   ALKPHOS 80   ALT 21   AST 51*   BILITOT 4.1*     No results for input(s): LACTIC in the last 72 hours.       Significant Diagnostics:  I have reviewed all pertinent imaging results/findings within the past 24 hours.

## 2019-09-14 NOTE — PROGRESS NOTES
[facility]  General Surgery  Progress Note    Subjective:     History of Present Illness:  No notes on file    Post-Op Info:  Procedure(s) (LRB):  LAPAROTOMY, EXPLORATORY (N/A)   1 Day Post-Op     Interval History: Pulled NGT postop. Otherwise NAEON. Tachycardic, high lactic. VSS otherwise. Patient feels ok. No nausea/vomiting. No BM/Flatus.     Medications:  Continuous Infusions:   lactated ringers 125 mL/hr at 09/14/19 0700     Scheduled Meds:   famotidine (PF)  20 mg Intravenous Q12H    fluconazole (DIFLUCAN) IVPB  400 mg Intravenous Q24H    piperacillin-tazobactam (ZOSYN) IVPB  4.5 g Intravenous Q8H     PRN Meds:benzocaine, calcium gluconate IVPB, lidocaine, magnesium sulfate IVPB, magnesium sulfate IVPB, morphine, morphine, ondansetron, ondansetron, potassium chloride in water **AND** potassium chloride in water **AND** potassium chloride in water, potassium chloride in water **AND** potassium chloride in water **AND** potassium chloride in water, promethazine (PHENERGAN) IVPB, sodium chloride 0.9%, sodium phosphate IVPB, sodium phosphate IVPB, sodium phosphate IVPB     Review of patient's allergies indicates:   Allergen Reactions    Aspirin (bulk) Other (See Comments)    Heparin analogues Other (See Comments)     Difficulty to arouse    Nsaids (non-steroidal anti-inflammatory drug)      Objective:     Vital Signs (Most Recent):  Temp: 98.2 °F (36.8 °C) (09/14/19 0300)  Pulse: 108 (09/14/19 0600)  Resp: 18 (09/14/19 0600)  BP: (!) 111/50 (09/14/19 0600)  SpO2: 95 % (09/14/19 0600) Vital Signs (24h Range):  Temp:  [97.5 °F (36.4 °C)-98.2 °F (36.8 °C)] 98.2 °F (36.8 °C)  Pulse:  [] 108  Resp:  [14-33] 18  SpO2:  [89 %-98 %] 95 %  BP: (109-193)/(49-74) 111/50     Weight: 126.4 kg (278 lb 10.6 oz)  Body mass index is 46.37 kg/m².    Intake/Output - Last 3 Shifts       09/12 0700 - 09/13 0659 09/13 0700 - 09/14 0659 09/14 0700 - 09/15 0659    I.V. (mL/kg)  3560 (28.2)     IV  Piggyback 2000 1000     Total Intake(mL/kg) 2000 (16.5) 4560 (36.1)     Urine (mL/kg/hr) 205 985 (0.3) 50 (0.8)    Total Output 205 985 50    Net +1795 +3575 -50                 Physical Exam   Constitutional: She is oriented to person, place, and time. No distress.   HENT:   Head: Normocephalic and atraumatic.   Cardiovascular: Normal rate.   Pulmonary/Chest: Effort normal.   Abdominal: Soft. She exhibits no distension. There is tenderness (attp). There is no guarding.   Neurological: She is alert and oriented to person, place, and time.   Psychiatric: She has a normal mood and affect. Her behavior is normal.       Significant Labs:  CBC:   Recent Labs   Lab 09/14/19 0329   WBC 5.39   RBC 2.29*   HGB 8.1*   HCT 25.6*   PLT 59*   *   MCH 35.4*   MCHC 31.6*     CMP:   Recent Labs   Lab 09/14/19 0329   GLU 85   CALCIUM 7.6*   ALBUMIN 1.8*   PROT 4.5*      K 4.9   CO2 14*   *   BUN 38*   CREATININE 1.7*   ALKPHOS 80   ALT 21   AST 51*   BILITOT 4.1*     No results for input(s): LACTIC in the last 72 hours.       Significant Diagnostics:  I have reviewed all pertinent imaging results/findings within the past 24 hours.    Assessment/Plan:     Small bowel perforation  NPO  IVF  IV abx x 72 hours  PCA for pain  Await ROBF  DVT and GI ppx        Rick Cintron MD  General Surgery  [facility]

## 2019-09-15 NOTE — PLAN OF CARE
Problem: Adult Inpatient Plan of Care  Goal: Plan of Care Review  Outcome: Ongoing (interventions implemented as appropriate)  POC reviewed with pt. Pt verbalized understanding. Questions and concerns addressed. No acute events today. Pt progressing toward goals. Will continue to monitor. See flowsheets for full assessment and VS info.

## 2019-09-15 NOTE — PROGRESS NOTES
[facility]  Critical Care - Surgery  Progress Note    Patient Name: Zaira Gupta  MRN: [id number]  Admission Date: 9/13/2019  Hospital Length of Stay: 1 days  Code Status: Full Code  Attending Provider: Zay Sánchez MD  Primary Care Provider: Minna Canchola NP   Principal Problem: Gastric perforation    Subjective:     Hospital/ICU Course:  No notes on file    Interval History/Significant Events: NAEO, VSS. Patient reports adequate pain control, tolerating NPO status. She has not ambulated out of bed. She reports no return of bowel function. Has not ambulated out of bed, adequate UOP. She denies nausea, vomiting. Lactate increasing to 9 from 7, trending today. One episode of orthostatic hypotension, which responded to 500cc LR bolus.     Follow-up For: Procedure(s) (LRB):  LAPAROTOMY, EXPLORATORY (N/A)    Post-Operative Day: 1 Day Post-Op    Objective:     Vital Signs (Most Recent):  Temp: 98.8 °F (37.1 °C) (09/14/19 1500)  Pulse: 108 (09/14/19 2115)  Resp: 20 (09/14/19 2115)  BP: (!) 92/45 (09/14/19 1800)  SpO2: 95 % (09/14/19 2115) Vital Signs (24h Range):  Temp:  [97.7 °F (36.5 °C)-98.8 °F (37.1 °C)] 98.8 °F (37.1 °C)  Pulse:  [102-116] 108  Resp:  [15-33] 20  SpO2:  [89 %-98 %] 95 %  BP: ()/(39-59) 92/45     Weight: 126.4 kg (278 lb 10.6 oz)  Body mass index is 46.37 kg/m².      Intake/Output Summary (Last 24 hours) at 9/14/2019 2220  Last data filed at 9/14/2019 1900  Gross per 24 hour   Intake 3708 ml   Output 1005 ml   Net 2703 ml       Physical Exam   Constitutional: She is oriented to person, place, and time. No distress.   HENT:   Head: Normocephalic and atraumatic.   Cardiovascular: Normal rate.   Pulmonary/Chest: Effort normal.   Abdominal: Incision clean/dry/intact, slight surrounding TTP. Soft. She exhibits no distension.   Neurological: She is alert and oriented to person, place, and time.   Psychiatric: She has a normal mood and affect. Her behavior is normal.           Lines/Drains/Airways     Drain                 Urethral Catheter 09/13/19 0006  16 Fr. 1 day          Peripheral Intravenous Line                 Peripheral IV - Single Lumen 09/12/19 1814 20 G Left Hand 2 days         Peripheral IV - Single Lumen 09/13/19 0021 22 G Right Forearm 1 day         Peripheral IV - Single Lumen 09/13/19 0454 20 G Left Forearm 1 day                Significant Labs:    CBC/Anemia Profile:  Recent Labs   Lab 09/13/19 0421 09/13/19  0953 09/14/19  0329   WBC 4.33 5.53 5.39   HGB 8.6* 9.6* 8.1*   HCT 26.4* 32.0* 25.6*   PLT 44* 55* 59*   * 116* 112*   RDW 19.8* 19.9* 20.1*        Chemistries:  Recent Labs   Lab 09/13/19 0421 09/13/19  0953 09/13/19  1521 09/14/19  0329    139 137 140   K 4.1 4.6 4.8 4.9    109 108 112*   CO2 17* 16* 15* 14*   BUN 22* 23* 27* 38*   CREATININE 1.2 1.3 1.4 1.7*   CALCIUM 7.3* 7.4* 7.4* 7.6*   ALBUMIN 2.0* 2.1*  --  1.8*   PROT 4.6* 5.3*  --  4.5*   BILITOT 4.9* 5.4*  --  4.1*   ALKPHOS 92 105  --  80   ALT 19 22  --  21   AST 45* 61*  --  51*   MG 1.7 1.8 2.2 2.3   PHOS 3.8 4.7*  --  5.0*     Significant Imaging:  I have reviewed all pertinent imaging results/findings within the past 24 hours.    Assessment/Plan:     Small bowel perforation  POD#1 SBR for small bowel perforation, recovering on the floor.     -Plan:    Neuro:   -PCA for pain control     Pulmonary:   -Comfortable on RA  - Pulmonary toilet  - IS    Cardiac:  - HDS not requiring pressors   - 500cc bolus for orthostatic hypotension    Renal:   - UOP 35-75/hr   - Bun/Cr 38/1.7  - Serum ammonia 99    Fluids/Electrolytes/Nutrition/GI:   -Nutritional status: NPO with ice chips  -replace lytes PRN  -mIVF    Hematology/Oncology:  -Hgb 12.6 (13.4)  -Plts 125  -Anticoagulation: severe allergy to heparin/lovenox (asystole in past x2, note of allergy from heme/onc)    Infectious Disease:   -Afebrile  -WBC 12.25 (14)    Endocrine:  -Glucose goal of 120-180    Dispo:  -Continue care in  the ICU setting           Critical care was time spent personally by me on the following activities: development of treatment plan with patient or surrogate and bedside caregivers, discussions with consultants, evaluation of patient's response to treatment, examination of patient, ordering and performing treatments and interventions, ordering and review of laboratory studies, ordering and review of radiographic studies, pulse oximetry, re-evaluation of patient's condition.  This critical care time did not overlap with that of any other provider or involve time for any procedures.     Geri Samuels MD  Critical Care - Surgery  [facility]

## 2019-09-15 NOTE — ASSESSMENT & PLAN NOTE
POD#2 SBR for small bowel perforation, recovering well in the ICU.    -Plan:    Neuro:   -PCA for pain control     Pulmonary:   -Comfortable on RA, recommend wean to po   - Pulmonary toilet  - IS    Cardiac:  - HDS not requiring pressors   - 500cc bolus for orthostatic hypotension given yesterday    Renal:   - UOP 35-75/hr   - Bun/Cr 38 / 1.7  - Serum ammonia 99    Fluids/Electrolytes/Nutrition/GI:   -Nutritional status: NPO with ice chips  -replace lytes PRN  -mIVF    Hematology/Oncology:  -Hgb 8.1 / 28.9 (12.6)   -Plts 85  -Anticoagulation: severe allergy to heparin/lovenox (asystole in past x2, note of allergy from heme/onc)    Infectious Disease:   -Afebrile  -WBC 12.25 (14)    Endocrine:  -Glucose goal of 120-180    Dispo:  -Recommend step down out of unit

## 2019-09-15 NOTE — SUBJECTIVE & OBJECTIVE
Interval History: No acute events overnight. Pt seen and examined at the bedside. Vital signs stable. No nausea/vomiting. No bm but with flatus.     Medications:  Continuous Infusions:   lactated ringers 125 mL/hr at 09/15/19 0500     Scheduled Meds:   famotidine (PF)  20 mg Intravenous Q12H    fluconazole (DIFLUCAN) IVPB  400 mg Intravenous Q24H    lactated ringers  1,000 mL Intravenous Once    piperacillin-tazobactam (ZOSYN) IVPB  4.5 g Intravenous Q8H     PRN Meds:benzocaine, calcium gluconate IVPB, lidocaine, magnesium sulfate IVPB, magnesium sulfate IVPB, morphine, morphine, ondansetron, ondansetron, potassium chloride in water **AND** potassium chloride in water **AND** potassium chloride in water, potassium chloride in water **AND** potassium chloride in water **AND** potassium chloride in water, promethazine (PHENERGAN) IVPB, sodium chloride 0.9%, sodium phosphate IVPB, sodium phosphate IVPB, sodium phosphate IVPB     Review of patient's allergies indicates:   Allergen Reactions    Aspirin (bulk) Other (See Comments)    Heparin analogues Other (See Comments)     Difficulty to arouse    Nsaids (non-steroidal anti-inflammatory drug)      Objective:     Vital Signs (Most Recent):  Temp: 98.2 °F (36.8 °C) (09/14/19 1900)  Pulse: 106 (09/15/19 0530)  Resp: 17 (09/15/19 0530)  BP: (!) 113/53 (09/15/19 0500)  SpO2: (!) 92 % (09/15/19 0530) Vital Signs (24h Range):  Temp:  [98.1 °F (36.7 °C)-98.8 °F (37.1 °C)] 98.2 °F (36.8 °C)  Pulse:  [102-115] 106  Resp:  [15-31] 17  SpO2:  [90 %-98 %] 92 %  BP: ()/(39-57) 113/53     Weight: 135 kg (297 lb 9.9 oz)  Body mass index is 49.53 kg/m².    Intake/Output - Last 3 Shifts       09/13 0700 - 09/14 0659 09/14 0700 - 09/15 0659 09/15 0700 - 09/16 0659    I.V. (mL/kg) 3560 (28.2) 4693 (34.8)     IV Piggyback 1000 500     Total Intake(mL/kg) 4560 (36.1) 5193 (38.5)     Urine (mL/kg/hr) 985 (0.3) 1205 (0.4)     Total Output 985 1205     Net +3575 +3983                   Physical Exam   Constitutional: She is oriented to person, place, and time. No distress.   HENT:   Head: Normocephalic and atraumatic.   Cardiovascular: Normal rate.   Pulmonary/Chest: Effort normal.   Abdominal: Soft. She exhibits no distension. There is tenderness (attp). There is no guarding.   Neurological: She is alert and oriented to person, place, and time.   Psychiatric: She has a normal mood and affect. Her behavior is normal.       Significant Labs:  CBC:   Recent Labs   Lab 09/15/19  0324   WBC 4.94   RBC 2.24*   HGB 7.8*   HCT 26.2*   PLT 58*   *   MCH 34.8*   MCHC 29.8*     CMP:   Recent Labs   Lab 09/15/19  0324   GLU 74   CALCIUM 8.2*   ALBUMIN 1.7*   PROT 4.4*      K 5.0   CO2 18*   *   BUN 52*   CREATININE 1.5*   ALKPHOS 78   ALT 24   AST 61*   BILITOT 3.9*     No results for input(s): LACTIC in the last 72 hours.       Significant Diagnostics:  I have reviewed all pertinent imaging results/findings within the past 24 hours.

## 2019-09-15 NOTE — ASSESSMENT & PLAN NOTE
POD#1 SBR for small bowel perforation, recovering on the floor.     -Plan:    Neuro:   -PCA for pain control     Pulmonary:   -Comfortable on RA  - Pulmonary toilet  - IS    Cardiac:  - HDS not requiring pressors   - 500cc bolus for orthostatic hypotension    Renal:   - UOP 35-75/hr   - Bun/Cr 38/1.7  - Serum ammonia 99    Fluids/Electrolytes/Nutrition/GI:   -Nutritional status: NPO with ice chips  -replace lytes PRN  -mIVF    Hematology/Oncology:  -Hgb 12.6 (13.4)  -Plts 125  -Anticoagulation: severe allergy to heparin/lovenox (asystole in past x2, note of allergy from heme/onc)    Infectious Disease:   -Afebrile  -WBC 12.25 (14)    Endocrine:  -Glucose goal of 120-180    Dispo:  -Continue care in the ICU setting

## 2019-09-15 NOTE — PROGRESS NOTES
[facility]  General Surgery  Progress Note    Subjective:     History of Present Illness:  No notes on file    Post-Op Info:  Procedure(s) (LRB):  LAPAROTOMY, EXPLORATORY (N/A)   2 Days Post-Op     Interval History: No acute events overnight. Pt seen and examined at the bedside. Vital signs stable. No nausea/vomiting. No bm but with flatus.     Medications:  Continuous Infusions:   lactated ringers 125 mL/hr at 09/15/19 0500     Scheduled Meds:   famotidine (PF)  20 mg Intravenous Q12H    fluconazole (DIFLUCAN) IVPB  400 mg Intravenous Q24H    lactated ringers  1,000 mL Intravenous Once    piperacillin-tazobactam (ZOSYN) IVPB  4.5 g Intravenous Q8H     PRN Meds:benzocaine, calcium gluconate IVPB, lidocaine, magnesium sulfate IVPB, magnesium sulfate IVPB, morphine, morphine, ondansetron, ondansetron, potassium chloride in water **AND** potassium chloride in water **AND** potassium chloride in water, potassium chloride in water **AND** potassium chloride in water **AND** potassium chloride in water, promethazine (PHENERGAN) IVPB, sodium chloride 0.9%, sodium phosphate IVPB, sodium phosphate IVPB, sodium phosphate IVPB     Review of patient's allergies indicates:   Allergen Reactions    Aspirin (bulk) Other (See Comments)    Heparin analogues Other (See Comments)     Difficulty to arouse    Nsaids (non-steroidal anti-inflammatory drug)      Objective:     Vital Signs (Most Recent):  Temp: 98.2 °F (36.8 °C) (09/14/19 1900)  Pulse: 106 (09/15/19 0530)  Resp: 17 (09/15/19 0530)  BP: (!) 113/53 (09/15/19 0500)  SpO2: (!) 92 % (09/15/19 0530) Vital Signs (24h Range):  Temp:  [98.1 °F (36.7 °C)-98.8 °F (37.1 °C)] 98.2 °F (36.8 °C)  Pulse:  [102-115] 106  Resp:  [15-31] 17  SpO2:  [90 %-98 %] 92 %  BP: ()/(39-57) 113/53     Weight: 135 kg (297 lb 9.9 oz)  Body mass index is 49.53 kg/m².    Intake/Output - Last 3 Shifts       09/13 0700 - 09/14 0659 09/14 0700 - 09/15 0659 09/15 0700 - 09/16  0659    I.V. (mL/kg) 3560 (28.2) 4693 (34.8)     IV Piggyback 1000 500     Total Intake(mL/kg) 4560 (36.1) 5193 (38.5)     Urine (mL/kg/hr) 985 (0.3) 1205 (0.4)     Total Output 985 1205     Net +3575 +3988                  Physical Exam   Constitutional: She is oriented to person, place, and time. No distress.   HENT:   Head: Normocephalic and atraumatic.   Cardiovascular: Normal rate.   Pulmonary/Chest: Effort normal.   Abdominal: Soft. She exhibits no distension. There is tenderness (attp). There is no guarding.   Neurological: She is alert and oriented to person, place, and time.   Psychiatric: She has a normal mood and affect. Her behavior is normal.       Significant Labs:  CBC:   Recent Labs   Lab 09/15/19  0324   WBC 4.94   RBC 2.24*   HGB 7.8*   HCT 26.2*   PLT 58*   *   MCH 34.8*   MCHC 29.8*     CMP:   Recent Labs   Lab 09/15/19  0324   GLU 74   CALCIUM 8.2*   ALBUMIN 1.7*   PROT 4.4*      K 5.0   CO2 18*   *   BUN 52*   CREATININE 1.5*   ALKPHOS 78   ALT 24   AST 61*   BILITOT 3.9*     No results for input(s): LACTIC in the last 72 hours.       Significant Diagnostics:  I have reviewed all pertinent imaging results/findings within the past 24 hours.    Assessment/Plan:     Small bowel perforation  CLD, ADAT  DVT and GI ppx  Care per SICU        Rick Cintron MD  General Surgery  [facility]

## 2019-09-15 NOTE — SUBJECTIVE & OBJECTIVE
Interval History/Significant Events: NAEO, VSS. Patient reports adequate pain control, tolerating NPO status. She has not ambulated out of bed. She reports no return of bowel function. Has not ambulated out of bed, adequate UOP. She denies nausea, vomiting. Lactate increasing to 9 from 7, trending today. One episode of orthostatic hypotension, which responded to 500cc LR bolus.     Follow-up For: Procedure(s) (LRB):  LAPAROTOMY, EXPLORATORY (N/A)    Post-Operative Day: 1 Day Post-Op    Objective:     Vital Signs (Most Recent):  Temp: 98.8 °F (37.1 °C) (09/14/19 1500)  Pulse: 108 (09/14/19 2115)  Resp: 20 (09/14/19 2115)  BP: (!) 92/45 (09/14/19 1800)  SpO2: 95 % (09/14/19 2115) Vital Signs (24h Range):  Temp:  [97.7 °F (36.5 °C)-98.8 °F (37.1 °C)] 98.8 °F (37.1 °C)  Pulse:  [102-116] 108  Resp:  [15-33] 20  SpO2:  [89 %-98 %] 95 %  BP: ()/(39-59) 92/45     Weight: 126.4 kg (278 lb 10.6 oz)  Body mass index is 46.37 kg/m².      Intake/Output Summary (Last 24 hours) at 9/14/2019 2220  Last data filed at 9/14/2019 1900  Gross per 24 hour   Intake 3708 ml   Output 1005 ml   Net 2703 ml       Physical Exam   Constitutional: She is oriented to person, place, and time. No distress.   HENT:   Head: Normocephalic and atraumatic.   Cardiovascular: Normal rate.   Pulmonary/Chest: Effort normal.   Abdominal: Incision clean/dry/intact, slight surrounding TTP. Soft. She exhibits no distension.   Neurological: She is alert and oriented to person, place, and time.   Psychiatric: She has a normal mood and affect. Her behavior is normal.          Lines/Drains/Airways     Drain                 Urethral Catheter 09/13/19 0006  16 Fr. 1 day          Peripheral Intravenous Line                 Peripheral IV - Single Lumen 09/12/19 1814 20 G Left Hand 2 days         Peripheral IV - Single Lumen 09/13/19 0021 22 G Right Forearm 1 day         Peripheral IV - Single Lumen 09/13/19 0454 20 G Left Forearm 1 day                Significant  Labs:    CBC/Anemia Profile:  Recent Labs   Lab 09/13/19  0421 09/13/19  0953 09/14/19  0329   WBC 4.33 5.53 5.39   HGB 8.6* 9.6* 8.1*   HCT 26.4* 32.0* 25.6*   PLT 44* 55* 59*   * 116* 112*   RDW 19.8* 19.9* 20.1*        Chemistries:  Recent Labs   Lab 09/13/19  0421 09/13/19  0953 09/13/19  1521 09/14/19  0329    139 137 140   K 4.1 4.6 4.8 4.9    109 108 112*   CO2 17* 16* 15* 14*   BUN 22* 23* 27* 38*   CREATININE 1.2 1.3 1.4 1.7*   CALCIUM 7.3* 7.4* 7.4* 7.6*   ALBUMIN 2.0* 2.1*  --  1.8*   PROT 4.6* 5.3*  --  4.5*   BILITOT 4.9* 5.4*  --  4.1*   ALKPHOS 92 105  --  80   ALT 19 22  --  21   AST 45* 61*  --  51*   MG 1.7 1.8 2.2 2.3   PHOS 3.8 4.7*  --  5.0*     Significant Imaging:  I have reviewed all pertinent imaging results/findings within the past 24 hours.

## 2019-09-15 NOTE — SUBJECTIVE & OBJECTIVE
Interval History/Significant Events: NAEO, VSS. Patient had one episode of orthostatic hypotension that resolved with a 500 cc bolus of LR. She reports good pain control with PCA, has not ambulated or had return to bowel function. She is passing gas. She denies subjective fevers, chills.     Follow-up For: Procedure(s) (LRB):  LAPAROTOMY, EXPLORATORY (N/A)    Post-Operative Day: 2 Days Post-Op    Objective:     Vital Signs (Most Recent):  Temp: 98.3 °F (36.8 °C) (09/15/19 0800)  Pulse: 106 (09/15/19 0900)  Resp: (!) 21 (09/15/19 0900)  BP: (!) 107/51 (09/15/19 0900)  SpO2: (!) 88 % (09/15/19 0900) Vital Signs (24h Range):  Temp:  [98.1 °F (36.7 °C)-98.8 °F (37.1 °C)] 98.3 °F (36.8 °C)  Pulse:  [102-112] 106  Resp:  [15-31] 21  SpO2:  [88 %-98 %] 88 %  BP: ()/(39-56) 107/51     Weight: 135 kg (297 lb 9.9 oz)  Body mass index is 49.53 kg/m².      Intake/Output Summary (Last 24 hours) at 9/15/2019 1053  Last data filed at 9/15/2019 0700  Gross per 24 hour   Intake 5193 ml   Output 1100 ml   Net 4093 ml       Physical Exam   Constitutional:   Obese female lying in bed   Cardiovascular: Normal rate, regular rhythm, normal heart sounds and intact distal pulses.   Pulmonary/Chest: Effort normal and breath sounds normal.   Abdominal: Soft. Bowel sounds are normal. She exhibits no distension. There is tenderness (incisional ).   Midline incision clean/dry/intact   Skin: Skin is warm and dry. Capillary refill takes less than 2 seconds.   Nursing note and vitals reviewed.      Vents:       Lines/Drains/Airways     Drain                 Urethral Catheter 09/13/19 0006  16 Fr. 2 days          Peripheral Intravenous Line                 Peripheral IV - Single Lumen 09/12/19 1814 20 G Left Hand 2 days         Peripheral IV - Single Lumen 09/13/19 0021 22 G Right Forearm 2 days         Peripheral IV - Single Lumen 09/13/19 0454 20 G Left Forearm 2 days                Significant Labs:    CBC/Anemia Profile:  Recent Labs   Lab  09/14/19  0329 09/15/19  0324   WBC 5.39 4.94   HGB 8.1* 7.8*   HCT 25.6* 26.2*   PLT 59* 58*   * 117*   RDW 20.1* 20.1*        Chemistries:  Recent Labs   Lab 09/13/19  1521 09/14/19  0329 09/15/19  0324    140 141   K 4.8 4.9 5.0    112* 112*   CO2 15* 14* 18*   BUN 27* 38* 52*   CREATININE 1.4 1.7* 1.5*   CALCIUM 7.4* 7.6* 8.2*   ALBUMIN  --  1.8* 1.7*   PROT  --  4.5* 4.4*   BILITOT  --  4.1* 3.9*   ALKPHOS  --  80 78   ALT  --  21 24   AST  --  51* 61*   MG 2.2 2.3 2.5   PHOS  --  5.0* 4.0       Significant Imaging:  I have reviewed all pertinent imaging results/findings within the past 24 hours.

## 2019-09-16 PROBLEM — K25.5 GASTRIC PERFORATION: Status: ACTIVE | Noted: 2019-01-01

## 2019-09-16 NOTE — OP NOTE
[facility]  Surgery Department  Operative Note    SUMMARY     Patient: Zaira Gupta  Date: 9/16/2019  MRN: [id number]    Date of Procedure: 9/13/2019     Procedure: Procedure(s) (LRB):  LAPAROTOMY, EXPLORATORY (N/A)     Enterectomy with primary anastomosis    Surgeon(s) and Role:     * Zay Sánchez MD - Primary     * Rick Alcaraz MD - Resident - Assisting        Pre-Operative Diagnosis: Perforation of intestine [K63.1]    Post-Operative Diagnosis: Post-Op Diagnosis Codes:     * Perforation of intestine [K63.1]    Anesthesia: General    Indications for Procedure:   Zaira Gupta is a 58 y.o.  female with Hx of ESLD, s/p TIPS, who presented with perforated viscus.  She was transported from outside facility for higher level of care.  The decision was made on arrival to transport to the patient to the OR emergently for exploratory laparotomy.  Risks, benefits, alternatives to surgery were discussed with the patient and her family.  Their questions were addressed and answered to their satisfaction.      Procedure in Detail:   After consent was obtained, the patient was taken to the operating room and general anesthesia was initiated successfully. The patient was positioned supine. The abdomen was prepped and draped in the normal sterile fashion. Pre-operative antibiotics were administered. Time out was performed and all present were in agreement. We began the procedure by making a midline incision through the skin with a #10 blade scalpel.  We continued the incision down through the fascia with Bovie electrocautery. Succus was encountered on entrance. First the stomach was examined given concern for a gastric perforation, however it was grossly normal in appearance without any evidence of inflammation or perforation. Next the small bowel was run, we encountered a 2cm perforation surrounded by hyperemic abnormal appearing small bowel. We then ran the remainder of the small bowel not noting  any abnormalities multiple times. We then examined the colon in its entirety and rectum noting no abnormalities. As such, we resected the abnormal and perforated bowel and performed a sided to side anastomosis with a 75 KALINA blue load stapler. The anastomosis was noted to be wide open and patent. We then proceeded to irrigate the abdomen copiously with multiple liters of warm NS. We then closed the fascia with two, #1 looped PDS sutures from below and then above. Skin was closed loosely with staples.     All counts were reported as correct. The procedure was completed.     The patient was aroused from general anesthesia and the endotracheal tube was removed in the operating room. The patient was brought to the recovery room in stable condition. Dr. Sánchez was present and scrubbed for all key portions of the case.     Drains: None    Estimated Blood Loss (EBL): Minimal    Complications: No    Specimens:   Specimen (12h ago, onward)    None          Condition: Fair    Disposition: ICU - extubated and stable.    Attestation: Dr. Sánchez was present and scrubbed for the all key portions of the procedure.      Rick Alcaraz MD PGY V  [phone]

## 2019-09-16 NOTE — PROGRESS NOTES
[facility]  Critical Care - Surgery  Progress Note    Patient Name: Zaira Gupta  MRN: [id number]  Admission Date: 9/13/2019  Hospital Length of Stay: 3 days  Code Status: Full Code  Attending Provider: Zay Sánchez MD  Primary Care Provider: Minna Canchola NP   Principal Problem: Small bowel perforation    Subjective:     Hospital/ICU Course:  9/13: Admit s/p small bowel resection for small bowel perforation. Extubated, HDS, not on pressors.   9/15: Started on CLD without issues. Passing flatus.   9/16: stable for step down to PCU    Interval History/Significant Events: NAEON, VSS. Lost IV access late this morning, multiple attempts to replace unsuccessful. Midline consult placed. Tolerating CLD, passing flatus. Step down to PCU today.    Follow-up For: Procedure(s) (LRB):  LAPAROTOMY, EXPLORATORY (N/A)    Post-Operative Day: 2 Days Post-Op    Objective:     Vital Signs (Most Recent):  Temp: 98.4 °F (36.9 °C) (09/15/19 2300)  Pulse: (!) 113 (09/16/19 0725)  Resp: 18 (09/16/19 0725)  BP: (!) 120/56 (09/16/19 0500)  SpO2: 97 % (09/16/19 0725) Vital Signs (24h Range):  Temp:  [98.3 °F (36.8 °C)-98.8 °F (37.1 °C)] 98.4 °F (36.9 °C)  Pulse:  [101-120] 113  Resp:  [15-32] 18  SpO2:  [84 %-99 %] 97 %  BP: (101-127)/(51-60) 120/56     Weight: 135 kg (297 lb 9.9 oz)  Body mass index is 49.53 kg/m².      Intake/Output Summary (Last 24 hours) at 9/16/2019 0751  Last data filed at 9/16/2019 0600  Gross per 24 hour   Intake 1653 ml   Output 1505 ml   Net 148 ml       Physical Exam   Constitutional:   Obese female lying in bed   Cardiovascular: Normal rate, regular rhythm, normal heart sounds and intact distal pulses.   Pulmonary/Chest: Effort normal and breath sounds normal.   Abdominal: Soft. Bowel sounds are normal. She exhibits no distension. There is tenderness (incisional ).   Midline incision clean/dry/intact   Skin: Skin is warm and dry. Capillary refill takes less than 2 seconds.   Nursing  note and vitals reviewed.      Vents:       Lines/Drains/Airways     Drain                 Urethral Catheter 09/13/19 0006  16 Fr. 3 days          Peripheral Intravenous Line                 Peripheral IV - Single Lumen 09/15/19 1100 20 G Right Antecubital less than 1 day                Significant Labs:    CBC/Anemia Profile:  Recent Labs   Lab 09/15/19  0324   WBC 4.94   HGB 7.8*   HCT 26.2*   PLT 58*   *   RDW 20.1*        Chemistries:  Recent Labs   Lab 09/15/19  0324      K 5.0   *   CO2 18*   BUN 52*   CREATININE 1.5*   CALCIUM 8.2*   ALBUMIN 1.7*   PROT 4.4*   BILITOT 3.9*   ALKPHOS 78   ALT 24   AST 61*   MG 2.5   PHOS 4.0       Significant Imaging:  I have reviewed all pertinent imaging results/findings within the past 24 hours.    Assessment/Plan:     * Small bowel perforation  POD#3 SBR for small bowel perforation, recovering well in the ICU.    Neuro:   - morphine PRN  - currently no IV access, midline consult placed    Pulmonary:   - 2L NC  - Pulmonary toilet  - IS    Cardiac:  - HDS not requiring pressors     Renal:   - UOP 65-90/hr   - Trend Bun/Cr, currently Cr 1.5 from 1.7  - Serum ammonia 99  - lasix 40 mg IV x1 today    Fluids/Electrolytes/Nutrition/GI:   -Nutritional status: CLD  -replace lytes PRN  -stop mIVF    Hematology/Oncology:  -Hgb 8.1 / 28.9 (12.6)   -Plts 85  -Anticoagulation: severe allergy to heparin/lovenox (asystole in past x2, note of allergy from heme/onc)    Infectious Disease:   -Afebrile  -WBC trending down    Endocrine:  -Glucose goal of 120-180    Dispo:  - Step down to PCU         Critical care was time spent personally by me on the following activities: development of treatment plan with patient or surrogate and bedside caregivers, discussions with consultants, evaluation of patient's response to treatment, examination of patient, ordering and performing treatments and interventions, ordering and review of laboratory studies, ordering and review of  radiographic studies, pulse oximetry, re-evaluation of patient's condition.  This critical care time did not overlap with that of any other provider or involve time for any procedures.     Akash Beach MD  Critical Care - Surgery  [facility]

## 2019-09-16 NOTE — PLAN OF CARE
CM visited patient. SLP working w/patient. Not medically stable for discharge. POD # 3, ready for step down.        09/16/19 1318   Discharge Reassessment   Assessment Type Discharge Planning Reassessment   Provided patient/caregiver education on the expected discharge date and the discharge plan No  (D/c date & plan: TBD, pending PT/OT recs, & medical stability. )   Do you have any problems affording any of your prescribed medications? No   Discharge Plan A Skilled Nursing Facility   Discharge Plan B Home Health;Home with family   DME Needed Upon Discharge    (TBD)   Anticipated Discharge Disposition SNF  (TBD/? SNF vs Home w/HH)   Can the patient answer the patient profile reliably? Yes, cognitively intact

## 2019-09-16 NOTE — CONSULTS
Placed 18g, 10 cm Midline in right brachial vein using u/s guidance.  Max dwell date 10/15/2019.  Lot # [id number].    Placed 20g 1 3/4 PIV in RFA using u/s guidance.

## 2019-09-16 NOTE — SUBJECTIVE & OBJECTIVE
Interval History/Significant Events: CARL PATEL. Lost IV access late this morning, multiple attempts to replace unsuccessful. Midline consult placed. Tolerating CLD, passing flatus. Step down to PCU today.    Follow-up For: Procedure(s) (LRB):  LAPAROTOMY, EXPLORATORY (N/A)    Post-Operative Day: 2 Days Post-Op    Objective:     Vital Signs (Most Recent):  Temp: 98.4 °F (36.9 °C) (09/15/19 2300)  Pulse: (!) 113 (09/16/19 0725)  Resp: 18 (09/16/19 0725)  BP: (!) 120/56 (09/16/19 0500)  SpO2: 97 % (09/16/19 0725) Vital Signs (24h Range):  Temp:  [98.3 °F (36.8 °C)-98.8 °F (37.1 °C)] 98.4 °F (36.9 °C)  Pulse:  [101-120] 113  Resp:  [15-32] 18  SpO2:  [84 %-99 %] 97 %  BP: (101-127)/(51-60) 120/56     Weight: 135 kg (297 lb 9.9 oz)  Body mass index is 49.53 kg/m².      Intake/Output Summary (Last 24 hours) at 9/16/2019 0751  Last data filed at 9/16/2019 0600  Gross per 24 hour   Intake 1653 ml   Output 1505 ml   Net 148 ml       Physical Exam   Constitutional:   Obese female lying in bed   Cardiovascular: Normal rate, regular rhythm, normal heart sounds and intact distal pulses.   Pulmonary/Chest: Effort normal and breath sounds normal.   Abdominal: Soft. Bowel sounds are normal. She exhibits no distension. There is tenderness (incisional ).   Midline incision clean/dry/intact   Skin: Skin is warm and dry. Capillary refill takes less than 2 seconds.   Nursing note and vitals reviewed.      Vents:       Lines/Drains/Airways     Drain                 Urethral Catheter 09/13/19 0006  16 Fr. 3 days          Peripheral Intravenous Line                 Peripheral IV - Single Lumen 09/15/19 1100 20 G Right Antecubital less than 1 day                Significant Labs:    CBC/Anemia Profile:  Recent Labs   Lab 09/15/19  0324   WBC 4.94   HGB 7.8*   HCT 26.2*   PLT 58*   *   RDW 20.1*        Chemistries:  Recent Labs   Lab 09/15/19  0324      K 5.0   *   CO2 18*   BUN 52*   CREATININE 1.5*   CALCIUM 8.2*    ALBUMIN 1.7*   PROT 4.4*   BILITOT 3.9*   ALKPHOS 78   ALT 24   AST 61*   MG 2.5   PHOS 4.0       Significant Imaging:  I have reviewed all pertinent imaging results/findings within the past 24 hours.

## 2019-09-16 NOTE — PLAN OF CARE
Problem: Adult Inpatient Plan of Care  Goal: Plan of Care Review  Outcome: Ongoing (interventions implemented as appropriate)  No acute events throughout the night. Patient vitals stable throughout the night. No venous access , no labs drawn and antibiotics not given ,MD notified.

## 2019-09-16 NOTE — PROGRESS NOTES
[facility]  General Surgery  Progress Note    Subjective:     History of Present Illness:  No notes on file    Post-Op Info:  Procedure(s) (LRB):  LAPAROTOMY, EXPLORATORY (N/A)   3 Days Post-Op     Interval History: No acute events overnight. Pt seen and examined at the bedside. Vital signs stable. Pain controlled.  Tolerating some clears. No nausea/vomiting. No bm but with flatus.     Medications:  Continuous Infusions:   lactated ringers Stopped (09/15/19 1800)     Scheduled Meds:   famotidine (PF)  20 mg Intravenous Q12H    fluconazole (DIFLUCAN) IVPB  400 mg Intravenous Q24H    piperacillin-tazobactam (ZOSYN) IVPB  4.5 g Intravenous Q8H     PRN Meds:benzocaine, calcium gluconate IVPB, lidocaine, magnesium sulfate IVPB, magnesium sulfate IVPB, morphine, morphine, ondansetron, ondansetron, potassium chloride in water **AND** potassium chloride in water **AND** potassium chloride in water, potassium chloride in water **AND** potassium chloride in water **AND** potassium chloride in water, promethazine (PHENERGAN) IVPB, sodium chloride 0.9%, sodium phosphate IVPB, sodium phosphate IVPB, sodium phosphate IVPB     Review of patient's allergies indicates:   Allergen Reactions    Aspirin (bulk) Other (See Comments)    Heparin analogues Other (See Comments)     Difficulty to arouse    Nsaids (non-steroidal anti-inflammatory drug)      Objective:     Vital Signs (Most Recent):  Temp: 98.4 °F (36.9 °C) (09/15/19 2300)  Pulse: 110 (09/16/19 0500)  Resp: 18 (09/16/19 0500)  BP: (!) 120/56 (09/16/19 0500)  SpO2: 98 % (09/16/19 0500) Vital Signs (24h Range):  Temp:  [98.3 °F (36.8 °C)-98.8 °F (37.1 °C)] 98.4 °F (36.9 °C)  Pulse:  [101-120] 110  Resp:  [15-32] 18  SpO2:  [84 %-99 %] 98 %  BP: (101-127)/(51-60) 120/56     Weight: 135 kg (297 lb 9.9 oz)  Body mass index is 49.53 kg/m².    Intake/Output - Last 3 Shifts       09/14 0700 - 09/15 0659 09/15 0700 - 09/16 0659 09/16 0700 - 09/17 0659    I.V.  (mL/kg) 4693 (34.8) 1653 (12.2)     IV Piggyback 500      Total Intake(mL/kg) 5193 (38.5) 1653 (12.2)     Urine (mL/kg/hr) 1205 (0.4) 1605 (0.5)     Total Output 1205 1605     Net +3988 +48                  Physical Exam   Constitutional: She is oriented to person, place, and time. No distress.   HENT:   Head: Normocephalic and atraumatic.   Cardiovascular: Normal rate.   Pulmonary/Chest: Effort normal.   Abdominal: Soft. She exhibits no distension. There is tenderness (attp). There is no guarding.   Neurological: She is alert and oriented to person, place, and time.   Psychiatric: She has a normal mood and affect. Her behavior is normal.       Significant Labs:  CBC:   Recent Labs   Lab 09/15/19  0324   WBC 4.94   RBC 2.24*   HGB 7.8*   HCT 26.2*   PLT 58*   *   MCH 34.8*   MCHC 29.8*     CMP:   Recent Labs   Lab 09/15/19  0324   GLU 74   CALCIUM 8.2*   ALBUMIN 1.7*   PROT 4.4*      K 5.0   CO2 18*   *   BUN 52*   CREATININE 1.5*   ALKPHOS 78   ALT 24   AST 61*   BILITOT 3.9*     No results for input(s): LACTIC in the last 72 hours.       Significant Diagnostics:  I have reviewed all pertinent imaging results/findings within the past 24 hours.    Assessment/Plan:     Small bowel perforation  58F s/p 9/13 Ex-Lap and small bowel resection.    -Okay for transfer to PCU  -Continue CLD today  -Chemical DVT PPx held for severe allergy to Heparin/Lovenox.  -SCDs and encourage ambulation          Roslyn Levin MD  General Surgery  [facility]

## 2019-09-16 NOTE — ASSESSMENT & PLAN NOTE
POD#3 SBR for small bowel perforation, recovering well in the ICU.    Neuro:   - morphine PRN  - currently no IV access, midline consult placed    Pulmonary:   - 2L NC  - Pulmonary toilet  - IS    Cardiac:  - HDS not requiring pressors     Renal:   - UOP 65-90/hr   - Trend Bun/Cr, currently Cr 1.5 from 1.7  - Serum ammonia 99  - lasix 40 mg IV x1 today    Fluids/Electrolytes/Nutrition/GI:   -Nutritional status: CLD  -replace lytes PRN  -mIVF    Hematology/Oncology:  -Hgb 8.1 / 28.9 (12.6)   -Plts 85  -Anticoagulation: severe allergy to heparin/lovenox (asystole in past x2, note of allergy from heme/onc)    Infectious Disease:   -Afebrile  -WBC trending down    Endocrine:  -Glucose goal of 120-180    Dispo:  - Step down to PCU

## 2019-09-16 NOTE — ASSESSMENT & PLAN NOTE
58F s/p 9/13 Ex-Lap and small bowel resection.    -Okay for transfer to PCU  -Continue CLD today  -Chemical DVT PPx held for severe allergy to Heparin/Lovenox.  -SCDs and encourage ambulation

## 2019-09-16 NOTE — HOSPITAL COURSE
"9/13: Admit s/p small bowel resection for small bowel perforation. Extubated, HDS, not on pressors.   9/15: Started on CLD without issues. Passing flatus.   9/16: stable for step down. PICC line placed, patient lost IV access.  9/17: stable for step down. Lost PICC line overnight. New consult placed.  - 9/23 Pt was stepped up from the floor due to bleeding per rectum (melena/bright red). She is stable, her mental status got worse during the afternoon. Started on lactulose for high ammonia. EGD was done showed not active duodenal ulcer, and major source of bleeding was found. Placed on protonix/octerotide gtt and ceftriaxone (for SBP prophylaxis)   - 9/24 During day time Pt GCS 8 or below but she was satting well on NC. She got intubated for airway protection, and rectal tube placement. Pathology from the Small bowel excision results from 9/13/19 returned today revealing "Diffuse large B-cell lymphoma. Hem/Onc are on board   - 9/25 Pt still has GCS of 8, her ammonia levels are trending down. Breathing spontaneously on the vent   - 9/26 Today Pt was opening eyes and following commands, but very weak. Hem/Onc on board. Uric acid levels are high >> started on allopurinol. Cr today is 2.1. Family talk with ramesho about comfort care, and DNR form has been singed   "

## 2019-09-16 NOTE — SUBJECTIVE & OBJECTIVE
Interval History: No acute events overnight. Pt seen and examined at the bedside. Vital signs stable. Pain controlled.  Tolerating some clears. No nausea/vomiting. No bm but with flatus.     Medications:  Continuous Infusions:   lactated ringers Stopped (09/15/19 1800)     Scheduled Meds:   famotidine (PF)  20 mg Intravenous Q12H    fluconazole (DIFLUCAN) IVPB  400 mg Intravenous Q24H    piperacillin-tazobactam (ZOSYN) IVPB  4.5 g Intravenous Q8H     PRN Meds:benzocaine, calcium gluconate IVPB, lidocaine, magnesium sulfate IVPB, magnesium sulfate IVPB, morphine, morphine, ondansetron, ondansetron, potassium chloride in water **AND** potassium chloride in water **AND** potassium chloride in water, potassium chloride in water **AND** potassium chloride in water **AND** potassium chloride in water, promethazine (PHENERGAN) IVPB, sodium chloride 0.9%, sodium phosphate IVPB, sodium phosphate IVPB, sodium phosphate IVPB     Review of patient's allergies indicates:   Allergen Reactions    Aspirin (bulk) Other (See Comments)    Heparin analogues Other (See Comments)     Difficulty to arouse    Nsaids (non-steroidal anti-inflammatory drug)      Objective:     Vital Signs (Most Recent):  Temp: 98.4 °F (36.9 °C) (09/15/19 2300)  Pulse: 110 (09/16/19 0500)  Resp: 18 (09/16/19 0500)  BP: (!) 120/56 (09/16/19 0500)  SpO2: 98 % (09/16/19 0500) Vital Signs (24h Range):  Temp:  [98.3 °F (36.8 °C)-98.8 °F (37.1 °C)] 98.4 °F (36.9 °C)  Pulse:  [101-120] 110  Resp:  [15-32] 18  SpO2:  [84 %-99 %] 98 %  BP: (101-127)/(51-60) 120/56     Weight: 135 kg (297 lb 9.9 oz)  Body mass index is 49.53 kg/m².    Intake/Output - Last 3 Shifts       09/14 0700 - 09/15 0659 09/15 0700 - 09/16 0659 09/16 0700 - 09/17 0659    I.V. (mL/kg) 4693 (34.8) 1653 (12.2)     IV Piggyback 500      Total Intake(mL/kg) 5193 (38.5) 1653 (12.2)     Urine (mL/kg/hr) 1205 (0.4) 1605 (0.5)     Total Output 1205 1605     Net +3988 +48                  Physical  Exam   Constitutional: She is oriented to person, place, and time. No distress.   HENT:   Head: Normocephalic and atraumatic.   Cardiovascular: Normal rate.   Pulmonary/Chest: Effort normal.   Abdominal: Soft. She exhibits no distension. There is tenderness (attp). There is no guarding.   Neurological: She is alert and oriented to person, place, and time.   Psychiatric: She has a normal mood and affect. Her behavior is normal.       Significant Labs:  CBC:   Recent Labs   Lab 09/15/19  0324   WBC 4.94   RBC 2.24*   HGB 7.8*   HCT 26.2*   PLT 58*   *   MCH 34.8*   MCHC 29.8*     CMP:   Recent Labs   Lab 09/15/19  0324   GLU 74   CALCIUM 8.2*   ALBUMIN 1.7*   PROT 4.4*      K 5.0   CO2 18*   *   BUN 52*   CREATININE 1.5*   ALKPHOS 78   ALT 24   AST 61*   BILITOT 3.9*     No results for input(s): LACTIC in the last 72 hours.       Significant Diagnostics:  I have reviewed all pertinent imaging results/findings within the past 24 hours.

## 2019-09-16 NOTE — NURSING
"Dx: Small bowel perforation    Shift Events:   Dr Alcaraz at bedside this afternoon and removed surgical dressing/staples and applied Wound Vac, setting at 100 mmHg. Minimal serous output noted this shift.    Orders received this am to transfer patient to Progressive Care Unit, still awaiting a bed assignment.     Neuro: AAO x4, Follows Commands and Moves All Extremities ; Patient drowsy throughout shift, but easy to arouse with verbal stimulation. Slurred / garbled speech. Frequently clearing throat.    Vital Signs: BP (!) 113/56 (BP Location: Left arm, Patient Position: Lying)   Pulse 100   Temp 98.2 °F (36.8 °C) (Oral)   Resp 17   Ht 5' 5" (1.651 m)   Wt 135 kg (297 lb 9.9 oz)   SpO2 97%   BMI 49.53 kg/m²     Diet: Clear Liquid - ST consulted today with recommendations for nectar thick liquids and pureed food when diet is increased.    Urine Output: Urinary Catheter 2420 cc/shift.  40mg Lasix given this shift.    Skin: Skin is thin and weepy, Skin tear to LUE, and excoriation noted to Abd and perineal folds; Interdry applied.        "

## 2019-09-16 NOTE — ASSESSMENT & PLAN NOTE
POD#4 SBR for small bowel perforation, recovering well in the ICU.    Neuro:   - morphine PRN  - currently no IV access, midline consult placed    Pulmonary:   - 2L NC  - Pulmonary toilet  - IS    Cardiac:  - HDS not requiring pressors     Renal:   - UOP  cc/hr  - Trend Bun/Cr  - Serum ammonia 99  - lasix 40 mg IV x1 on 9/16 with good response  - lasix again today pending morning labs    Fluids/Electrolytes/Nutrition/GI:   -Nutritional status: CLD nectar thick  -replace lytes PRN  -stop mIVF    Hematology/Oncology:  -Hgb stable   -Anticoagulation: severe allergy to heparin/lovenox (asystole in past x2, note of allergy from heme/onc)     Infectious Disease:   -Afebrile   -WBC trending down     Endocrine:  -Glucose goal of 120-180    Dispo:  - Step down to PCU

## 2019-09-16 NOTE — PT/OT/SLP EVAL
Speech Language Pathology Evaluation  Bedside Swallow    Patient Name:  Zaira Gupta   MRN:  [id number]  Admitting Diagnosis: Small bowel perforation    Recommendations:                 General Recommendations:  Dysphagia therapy  Diet recommendations:  Pleasure Feeding, Puree, Nectar Thick   Aspiration Precautions: 1 bite/sip at a time, Assistance with meals and Assistance with thickening liquids, Feed only when awake/alert, HOB to 90 degrees, Meds crushed in puree, Monitor for s/s of aspiration, No straws, Puree for pleasure, Small bites/sips and Strict aspiration precautions To achieve nectar-thickened liquids, please use 6 oz of any liquid to 1 packet of thickener.  General Precautions: Standard, aspiration, nectar thick  Communication strategies:  none    History:     Past Medical History:   Diagnosis Date    Acid reflux     Anemia     Arthritis     Autoimmune hepatitis 10/23/2012      Ref. Range 2012 09:38  BRANT Latest Range: Neg <1:160  Pos, Titer to follow (A)  BRANT HEP-2 Titer Latest Range: Neg <1:160 titer Pos 1:320 (A)  BRANT Hep-2 Pattern No range found Homogeneous (A)  Anti-SSA Antibody Latest Range: <20 EU 1.68  Anti-SSA Interpretation Latest Range: Negative  Negative  Anti-SSB Antibody Latest Range: <20 EU 1.76  Anti-SSB Interpretation Latest Range: Negative  Negativ    Cirrhosis     Dry mouth     Esophageal varix bleeding     Hypothyroidism     Obesity     Thrombocytopenia        Past Surgical History:   Procedure Laterality Date    BLADDER SURGERY  in      SECTION, CLASSIC      DILATION AND CURETTAGE OF UTERUS  19932 MAB    EGD (ESOPHAGOGASTRODUODENOSCOPY) N/A 11/15/2013    Performed by [facility]un [facility]man [facility] at [facility] ENDO (2ND FLR)    ESOPHAGOGASTRODUODENOSCOPY (EGD) N/A 2015    Performed by Luis [facility]ran-Reyes, [facility] at [facility] ENDO    LAPAROTOMY, EXPLORATORY N/A 2019    Performed by Zay Sánchez MD at [facility] OR 2ND FLR    TIPS PROCEDURE      TIPS  revision  7/5/2012    TONSILLECTOMY, ADENOIDECTOMY       Prior Intubation HX:  Intubated for two hours for procedure on 9/13/19    Modified Barium Swallow: None on file    Chest X-Rays 9/14/19: Stable chest no acute disease    Prior diet: regular/thin, although patient endorsed difficulty swallowing over the past several weeks 2' to swollen thyroid    Subjective     Patient awake and alert during session  Communicated with nurse prior to session     Pain/Comfort:  · Pain Rating 1: 0/10  · Pain Rating Post-Intervention 1: 0/10    Objective:     Oral Musculature Evaluation  · Oral Musculature: WFL  · Dentition: present and adequate  · Secretion Management: adequate  · Mucosal Quality: dry  · Mandibular Strength and Mobility: WFL  · Oral Labial Strength and Mobility: WFL  · Lingual Strength and Mobility: WFL  · Buccal Strength and Mobility: WFL  · Volitional Cough: adequate  · Volitional Swallow: timely; adequate laryngeal elevation  · Voice Prior to PO Intake: mildly hoarse; decreased intensity    Bedside Swallow Eval:   Consistencies Assessed:  · Thin liquids x2 (via tsp and cup-edge)  · Nectar thick liquids x5 (via cup-edge)  · Puree x3 (tsp applesauce)   · **Patient declined solid trials at this time 2' to concern of odynophagia as well as lack of appetite    Oral Phase:   · WFL    Pharyngeal Phase:   · Immediate coughing following cup-edge thin liquid trial   · Mild globus sensation following final puree trial that cleared with second swallow    Compensatory Strategies  · None    Treatment: Patient seen for a bedside swallow eval. She was sitting up in bed during session. Patient with intermittent throat clearing throughout session with blood tinged mucus following one episode of coughing. SLP educated patient regarding current diet and aspiration precautions, but she would benefit from ongoing instruction. Whiteboard updated. Patient left in bed with call light within reach. Diet recs communicated to treatment  team.     Assessment:     Zaira Gupta is a 58 y.o. female with an SLP diagnosis of Dysphagia.      Goals:   Multidisciplinary Problems     SLP Goals        Problem: SLP Goal    Goal Priority Disciplines Outcome   SLP Goal     SLP    Description:  Speech-Language Pathology Goals  Goals to be met by 9/23/19  1. Patient will tolerate nectar-thick liquids with no s/s of aspiration.   2. Patient will participate in ongoing swallow assessment in order to determine appropriateness of diet advancement.                     Plan:     · Patient to be seen:  5 x/week   · Plan of Care expires:  10/16/19  · Plan of Care reviewed with:  patient   · SLP Follow-Up:  Yes       Discharge recommendations:  (pending)   Barriers to Discharge:  Level of Skilled Assistance Needed     Time Tracking:     SLP Treatment Date:   09/16/19  Speech Start Time:  1313  Speech Stop Time:  1328     Speech Total Time (min):  15 min    Billable Minutes: Eval Swallow and Oral Function 15    Clem Whyte CCC-SLP  Speech-Language Pathology  Pager: [phone]   09/16/2019

## 2019-09-16 NOTE — PLAN OF CARE
Problem: SLP Goal  Goal: SLP Goal  Speech-Language Pathology Goals  Goals to be met by 9/23/19  1. Patient will tolerate nectar-thick liquids with no s/s of aspiration.   2. Patient will participate in ongoing swallow assessment in order to determine appropriateness of diet advancement.   Patient seen for bedside swallow eval. SLP recommending nectar-thick liquids with meds crushed in puree and puree for pleasure.     Clem Whyte CCC-SLP  Speech-Language Pathology  Pager: [phone]

## 2019-09-17 NOTE — PT/OT/SLP EVAL
Physical Therapy Evaluation    Patient Name:  Zaira Gupta   MRN:  [id number]  Admit Date: 9/13/2019  Admitting Diagnosis:  Small bowel perforation   Length of Stay: 4 days  Recent Surgery: Procedure(s) (LRB):  LAPAROTOMY, EXPLORATORY (N/A) 4 Days Post-Op    Recommendations:     Discharge Recommendations:  home health PT   Discharge Equipment Recommendations: walker, rolling(bariatric)   Barriers to discharge: Decreased caregiver support    Assessment:     Zaira Gupta is a 58 y.o. female admitted with a medical diagnosis of Small bowel perforation.  She presents with the following impairments/functional limitations: decreased functional mobility. Pt is limited by back and abdominal pain with movement. Zaira Gupta's deficits effect their ability to safely and independently participate in self-care tasks and functional mobility which increases their caregiver burden. Due to her physical therapy diagnosis of debility and deconditioning, they continue to benefit from acute PT services to address the following limitations: high fall risk, weakness, instability, and the need for supervised instruction of exercise. Zaira Williamsons deficits effect their roles and responsibilities in which they were able to complete prior to admit. Education was provided to patient regarding importance of continued participation in therapy, patient's progress and discharge disposition. Pt would benefit from an intensive and collaborative PT/OT/SLP therapy program to improve quality of life and focus on recovery of impairments.     Problem List: impaired self care skills, impaired functional mobilty, impaired cardiopulmonary response to activity, pain, impaired endurance  Rehab Prognosis: Good; patient would benefit from acute skilled PT services to address these deficits and reach maximum level of function.      Plan:     During this hospitalization, patient to be seen 3 x/week to address the identified rehab impairments via  "gait training, therapeutic activities, therapeutic exercises, neuromuscular re-education and progress towards the established goals.    · Plan of Care Expires:  10/16/19    Subjective     Chief Complaint: back pain  Patient/Family Comments/goals: "I'm the oldest, I'm used to taking care of everyone. I guess they gotta take care of me now."  Pain/Comfort:  · Pain Rating 1: 8/10  · Location - Side 1: Bilateral  · Location - Orientation 1: generalized  · Location 1: back(minimal c/o abdoinal pain)  · Pain Addressed 1: Pre-medicate for activity  · Pain Rating Post-Intervention 1: 8/10    Living Environment:  Patient lives with mother in a single family home with no MAIKOL. Pt provides minimal physical assist for mother.  Prior Level of Function: Patient reports being independent with mobility & with ADLs. Patient uses DME as follows: none. DME owned (not currently used): none.  Roles/Repsonsibilities: Hand Dominance: right Work: no. Drive: yes. Managing Medicines/Managing Home: no. Hobbies: shooting darts.    Patient reports they will have assistance from sisters upon discharge.    Objective:     Patient found with: blood pressure cuff, pulse ox (continuous), telemetry, PICC line, wound vac, russ catheter, oxygen     General Precautions: Standard, Cardiac aspiration, fall   Orthopedic Precautions:N/A   Braces: N/A   Body mass index is 46.96 kg/m².  Vitals: BP (!) 181/72 (BP Location: Left arm, Patient Position: Lying)   Pulse 101   Temp 98 °F (36.7 °C) (Oral)   Resp 17   Ht 5' 5" (1.651 m)   Wt 128 kg (282 lb 3 oz)   LMP  (LMP Unknown)   SpO2 98%   Breastfeeding? No   BMI 46.96 kg/m²    Oxygen Device: Nasal Cannula 1L    Exams:  · Mental Status: Patient is AxOx4 and follows all multi-step verbal commands. Pt is Alert and Cooperative during session.  · Skin Integrity: Visible skin intact  · Edema: None noted  · Sensation: Intact  · Hearing: Intact  · Vision:  Intact  · Postural Assessment: slouched posture and " rounded shoulders  · Range of Motion:  · RUE: WFL  · LUE: WFL  · RLE: WFL  · LLE: WFL  · Strength Exam:  · Bilateral Upper Extremity Strength: grossly WFL  · Bilateral Lower Extremity Strength: grossly WFL    Outcome Measures:  AM-PAC 6 CLICK MOBILITY  Turning over in bed (including adjusting bedclothes, sheets and blankets)?: 2  Sitting down on and standing up from a chair with arms (e.g., wheelchair, bedside commode, etc.): 3  Moving from lying on back to sitting on the side of the bed?: 2  Moving to and from a bed to a chair (including a wheelchair)?: 3  Need to walk in hospital room?: 3  Climbing 3-5 steps with a railing?: 2  Basic Mobility Total Score: 15     Functional Mobility:  Additional staff present: SPT  Bed Mobility:   · Supine to Sit: moderate assistance; from right side of bed  · Scooting anteriorly to EOB to have both feet planted on floor: contact guard assistance  · Sit to Supine: moderate assistance; to right side of bed  · Due to height of bed vs height of pt    Transfers:   · Sit <> Stand Transfer: minimum assistance with hand-held assist   · Stand <> Sit Transfer: minimum assistance with hand-held assist   · [id number] from EOB      Gait:   · Patient ambulated: 4-5 lateral steps to the right   · Patient required: minimal assist  · Patient used:  hand-held assist   · Gait Pattern observed: 2-point gait  · Gait Deviation(s): flexed posture and decreased gayle  · Impairments due to: pain  · Comments: pt holding abdomen due to pain.    Therapeutic Activities & Exercises:   Education:  Patient provided with daily orientation and goals of this PT session. Patient agreed to participate in session. Patient aware of patient's deficits and therapy progression. They were educated to transfer to bedside chair/bedside commode/bathroom with RN/PCT present. Encouraged patient to perform daily exercises & mobility to increase endurance and decrease effects of bedrest. Time provided for therapeutic counseling  and discussion of health disposition. All questions answered to patient's satisfaction, within scope of PT practice; voiced no other concerns. White board updated in patient's room, RN notified of session.    Patient left supine, with head in midline, neutral pelvis & heels floated for skin protection with all lines intact, call button in reach and RN notified.    GOALS:   Multidisciplinary Problems     Physical Therapy Goals        Problem: Physical Therapy Goal    Goal Priority Disciplines Outcome Goal Variances Interventions   Physical Therapy Goal     PT, PT/OT Ongoing (interventions implemented as appropriate)     Description:  Goals to be met by: 2019    Patient will increase functional independence with mobility by performin. Supine <> sit with Supervision.  2. Sit <> stand transfer with Supervision using No Assistive Device and Rolling Walker.  3. Bed <> chair transfer via Stand Pivot with Supervision using No Assistive Device and Rolling Walker.  4. Gait  x 150 feet with Stand-by Assistance using No Assistive Device and Rolling Walker to prepare for community ambulation and endurance activities.  5. Able to tolerate exercise for 15-20 reps with independence.                        History:     Past Medical History:   Diagnosis Date    Acid reflux     Anemia     Arthritis     Autoimmune hepatitis 10/23/2012      Ref. Range 2012 09:38  BRANT Latest Range: Neg <1:160  Pos, Titer to follow (A)  BRANT HEP-2 Titer Latest Range: Neg <1:160 titer Pos 1:320 (A)  BRANT Hep-2 Pattern No range found Homogeneous (A)  Anti-SSA Antibody Latest Range: <20 EU 1.68  Anti-SSA Interpretation Latest Range: Negative  Negative  Anti-SSB Antibody Latest Range: <20 EU 1.76  Anti-SSB Interpretation Latest Range: Negative  Negativ    Cirrhosis     Dry mouth     Esophageal varix bleeding     Hypothyroidism     Obesity     Thrombocytopenia        Past Surgical History:   Procedure Laterality Date    BLADDER SURGERY   in 1964     SECTION, CLASSIC      DILATION AND CURETTAGE OF UTERUS      2/2 MAB    EGD (ESOPHAGOGASTRODUODENOSCOPY) N/A 11/15/2013    Performed by [facility] at [facility] ENDO (2ND FLR)    ESOPHAGOGASTRODUODENOSCOPY (EGD) N/A 2015    Performed by Luis [facility]ran[facility]yes[facility]    LAPAROTOMY, EXPLORATORY N/A 2019    Performed by Zay Sánchez MD at [facility] OR 2ND FLR    TIPS PROCEDURE      TIPS revision  2012    TONSILLECTOMY, ADENOIDECTOMY         Time Tracking:     PT Received On: 19  PT Start Time: 1411     PT Stop Time: 1440  PT Total Time (min): 29 min     Billable Minutes: Evaluation 20 and Therapeutic Activity 9      Aarti Feng PT, DPT  2019  Pager: [phone]

## 2019-09-17 NOTE — PLAN OF CARE
Problem: SLP Goal  Goal: SLP Goal  Speech-Language Pathology Goals  Goals to be met by 9/23/19  1. Patient will participate in ongoing swallow assessment in order to determine appropriateness of diet initiation.  2. Patient will participate in a modified barium swallow study in order to objectively assess swallow function.  3. Educate patient regarding risks/warning signs of aspiration.    Patient seen for ongoing swallow assessment. Patient with increased difficulty across trials today and SLP recommending continued NPO at this time. If patient has any meds that need to taken orally the safest method would be crushed in honey-thick liquid and delivered via tsp. SLP recommending a modified barium swallow study in order to objectively assess swallow function.     Clem Whyte CCC-SLP  Speech-Language Pathology  Pager: [phone]

## 2019-09-17 NOTE — PLAN OF CARE
Problem: Physical Therapy Goal  Goal: Physical Therapy Goal  Goals to be met by: 2019    Patient will increase functional independence with mobility by performin. Supine <> sit with Supervision.  2. Sit <> stand transfer with Supervision using No Assistive Device and Rolling Walker.  3. Bed <> chair transfer via Stand Pivot with Supervision using No Assistive Device and Rolling Walker.  4. Gait  x 150 feet with Stand-by Assistance using No Assistive Device and Rolling Walker to prepare for community ambulation and endurance activities.  5. Able to tolerate exercise for 15-20 reps with independence.      Outcome: Ongoing (interventions implemented as appropriate)    Discharge Recommendation: HHPT.  Please continue Progressive Mobility Protocol as appropriate.  Appropriate transfer level with nursing staff: Bed <> Chair:  Stand Pivot with minimum assistance with No Assistive Device.    Aarti Feng PT, DPT  2019  Pager: [phone]

## 2019-09-17 NOTE — PROGRESS NOTES
[facility]  General Surgery  Progress Note    Subjective:     History of Present Illness:  No notes on file    Post-Op Info:  Procedure(s) (LRB):  LAPAROTOMY, EXPLORATORY (N/A)   4 Days Post-Op     Interval History: No acute events overnight. No flatus. Some nausea.    Medications:  Continuous Infusions:    Scheduled Meds:   famotidine (PF)  20 mg Intravenous Q12H     PRN Meds:benzocaine, calcium gluconate IVPB, lidocaine, magnesium sulfate IVPB, magnesium sulfate IVPB, morphine, morphine, ondansetron, ondansetron, potassium chloride in water **AND** potassium chloride in water **AND** potassium chloride in water, potassium chloride in water **AND** potassium chloride in water **AND** potassium chloride in water, promethazine (PHENERGAN) IVPB, sodium chloride 0.9%, sodium phosphate IVPB, sodium phosphate IVPB, sodium phosphate IVPB     Review of patient's allergies indicates:   Allergen Reactions    Aspirin (bulk) Other (See Comments)    Heparin analogues Other (See Comments)     Difficulty to arouse    Nsaids (non-steroidal anti-inflammatory drug)      Objective:     Vital Signs (Most Recent):  Temp: 98.3 °F (36.8 °C) (09/17/19 0300)  Pulse: 105 (09/17/19 0615)  Resp: 19 (09/17/19 0615)  BP: 139/68 (09/17/19 0400)  SpO2: 98 % (09/17/19 0615) Vital Signs (24h Range):  Temp:  [97.7 °F (36.5 °C)-98.3 °F (36.8 °C)] 98.3 °F (36.8 °C)  Pulse:  [] 105  Resp:  [14-38] 19  SpO2:  [87 %-100 %] 98 %  BP: ()/(45-68) 139/68     Weight: 128 kg (282 lb 3 oz)  Body mass index is 46.96 kg/m².    Intake/Output - Last 3 Shifts       09/15 0700 - 09/16 0659 09/16 0700 - 09/17 0659    I.V. (mL/kg) 1653 (12.2) 428 (3.3)    Total Intake(mL/kg) 1653 (12.2) 428 (3.3)    Urine (mL/kg/hr) 1605 (0.5) 3655 (1.2)    Total Output 1605 3655    Net +48 -1833                Physical Exam   Constitutional: She is oriented to person, place, and time. No distress.   HENT:   Head: Normocephalic and atraumatic.    Cardiovascular: Normal rate.   Pulmonary/Chest: Effort normal.   Abdominal: Soft. She exhibits no distension. There is tenderness (attp). There is no guarding.   Neurological: She is alert and oriented to person, place, and time.   Psychiatric: She has a normal mood and affect. Her behavior is normal.       Significant Labs:  CBC:   Recent Labs   Lab 09/16/19  1136   WBC 3.76*   RBC 2.18*   HGB 7.7*   HCT 25.0*   PLT 44*   *   MCH 35.3*   MCHC 30.8*     CMP:   Recent Labs   Lab 09/16/19  1136   GLU 84   CALCIUM 8.0*   ALBUMIN 1.6*   PROT 4.2*      K 4.7   CO2 24   *   BUN 51*   CREATININE 1.1   ALKPHOS 82   ALT 29   AST 70*   BILITOT 3.9*     No results for input(s): LACTIC in the last 72 hours.       Significant Diagnostics:  I have reviewed all pertinent imaging results/findings within the past 24 hours.    Assessment/Plan:     * Small bowel perforation  58F s/p 9/13 Ex-Lap and small bowel resection.    -Okay for transfer to PCU  - NPO for now  - KUB  - MIVF  - IV pain control          Lien Arango MD  General Surgery  [facility]

## 2019-09-17 NOTE — PLAN OF CARE
Problem: Occupational Therapy Goal  Goal: Occupational Therapy Goal  Goals to be met by: 10/1/19     Patient will increase functional independence with ADLs by performing:    UE Dressing with [place].  LE Dressing with Modified [place] and Assistive Devices as needed.  Grooming while standing at sink with Modified [place].  Toileting from toilet with Modified [place] for hygiene and clothing management.   Bathing from  edge of bed with Modified [place].  Toilet transfer to toilet with Modified [place].  Increased functional strength to WFL for B UE.  Upper extremity exercise program x15 reps per handout, with independence.    POC initiated.

## 2019-09-17 NOTE — ASSESSMENT & PLAN NOTE
58F s/p 9/13 Ex-Lap and small bowel resection.    -Okay for transfer to PCU  - NPO for now  - KUB  - MIVF  - IV pain control     Lab Results   Component Value Date    HGBA1C 7 1 (H) 08/28/2018       No results for input(s): POCGLU in the last 72 hours      Blood Sugar Average: Last 72 hrs:does not check sugars  DM type 2 with hyperglycemia - uncontrolled but improved with A1C 7 1 - urged to con't watching sugars/carbs and increase activity level as tolerated, recheck BW in about 4 mos - order given, con't current Metformin, will get copy of eye exam from [facility] in [facility] (due again next month as per pt) and foot exam is UTD (Nov 2017), on ARB and statin and ASA

## 2019-09-17 NOTE — PROGRESS NOTES
[facility]  Critical Care - Surgery  Progress Note    Patient Name: Zaira Gupta  MRN: [id number]  Admission Date: 9/13/2019  Hospital Length of Stay: 4 days  Code Status: Full Code  Attending Provider: Zay Sánchez MD  Primary Care Provider: Minna Canchola NP   Principal Problem: Small bowel perforation    Subjective:     Hospital/ICU Course:  9/13: Admit s/p small bowel resection for small bowel perforation. Extubated, HDS, not on pressors.   9/15: Started on CLD without issues. Passing flatus.   9/16: stable for step down. PICC line placed, patient lost IV access.  9/17: stable for step down. Lost PICC line overnight. New consult placed.    Interval History/Significant Events: CARL PATEL. PICC line placed yesterday, lost IV access again this morning. New consult placed. Stable for stepdown.    Follow-up For: Procedure(s) (LRB):  LAPAROTOMY, EXPLORATORY (N/A)    Post-Operative Day: 2 Days Post-Op    Objective:     Vital Signs (Most Recent):  Temp: 98.3 °F (36.8 °C) (09/17/19 0300)  Pulse: 105 (09/17/19 0615)  Resp: 19 (09/17/19 0615)  BP: 139/68 (09/17/19 0400)  SpO2: 98 % (09/17/19 0615) Vital Signs (24h Range):  Temp:  [97.8 °F (36.6 °C)-98.3 °F (36.8 °C)] 98.3 °F (36.8 °C)  Pulse:  [] 105  Resp:  [14-38] 19  SpO2:  [87 %-100 %] 98 %  BP: ()/(45-68) 139/68     Weight: 128 kg (282 lb 3 oz)  Body mass index is 46.96 kg/m².      Intake/Output Summary (Last 24 hours) at 9/17/2019 0756  Last data filed at 9/17/2019 0500  Gross per 24 hour   Intake 428 ml   Output 3555 ml   Net -3127 ml       Physical Exam   Constitutional:   Obese female lying in bed   Cardiovascular: Normal rate, regular rhythm, normal heart sounds and intact distal pulses.   Pulmonary/Chest: Effort normal and breath sounds normal.   Abdominal: Soft. Bowel sounds are normal. She exhibits no distension. There is tenderness (incisional ).   Midline incision clean/dry/intact   Skin: Skin is warm and dry.  Capillary refill takes less than 2 seconds.   Nursing note and vitals reviewed.      Vents:       Lines/Drains/Airways     Drain                 Urethral Catheter 09/13/19 0006  16 Fr. 4 days          Peripheral Intravenous Line                 Midline Catheter Insertion/Assessment  - Single Lumen 09/16/19 1032 Right brachial vein 18g x 10cm less than 1 day         Peripheral IV - Single Lumen 09/16/19 1033 20 G;1 3/4 in Right;Anterior Forearm less than 1 day                Significant Labs:    CBC/Anemia Profile:  Recent Labs   Lab 09/16/19  1136   WBC 3.76*   HGB 7.7*   HCT 25.0*   PLT 44*   *   RDW 19.3*        Chemistries:  Recent Labs   Lab 09/16/19  1136      K 4.7   *   CO2 24   BUN 51*   CREATININE 1.1   CALCIUM 8.0*   ALBUMIN 1.6*   PROT 4.2*   BILITOT 3.9*   ALKPHOS 82   ALT 29   AST 70*   MG 2.4   PHOS 3.1       Significant Imaging:  I have reviewed all pertinent imaging results/findings within the past 24 hours.    Assessment/Plan:     * Small bowel perforation  POD#4 SBR for small bowel perforation, recovering well in the ICU.    Neuro:   - morphine PRN  - currently no IV access, midline consult placed    Pulmonary:   - 2L NC  - Pulmonary toilet  - IS    Cardiac:  - HDS not requiring pressors     Renal:   - UOP  cc/hr  - Trend Bun/Cr  - Serum ammonia 99  - lasix 40 mg IV x1 on 9/16 with good response  - lasix again today pending morning labs    Fluids/Electrolytes/Nutrition/GI:   -Nutritional status: CLD nectar thick  -replace lytes PRN  -stop mIVF    Hematology/Oncology:  -Hgb stable   -Anticoagulation: severe allergy to heparin/lovenox (asystole in past x2, note of allergy from heme/onc)     Infectious Disease:   -Afebrile   -WBC trending down     Endocrine:  -Glucose goal of 120-180    Dispo:  - Step down to PCU           Critical care was time spent personally by me on the following activities: development of treatment plan with patient or surrogate and bedside caregivers,  discussions with consultants, evaluation of patient's response to treatment, examination of patient, ordering and performing treatments and interventions, ordering and review of laboratory studies, ordering and review of radiographic studies, pulse oximetry, re-evaluation of patient's condition.  This critical care time did not overlap with that of any other provider or involve time for any procedures.     Akash Beach MD  Critical Care - Surgery  [facility]

## 2019-09-17 NOTE — CONSULTS
Midline positional . Easy to flush and draws back easily with arm extended and light pressure on line

## 2019-09-17 NOTE — PLAN OF CARE
"Problem: Adult Inpatient Plan of Care  Goal: Plan of Care Review         Outcome: Ongoing (interventions implemented as appropriate)  Dx: Small bowel perforation    Shift Events: vitals stable throughout night. Patient has poor venous access. Midline not working, consult put in. Labs not drawn, MD notified.    Neuro: AAO x4, Follows Commands and Moves All Extremities    Vital Signs: /68 (BP Location: Left arm, Patient Position: Lying)   Pulse 105   Temp 98.3 °F (36.8 °C) (Oral)   Resp 19   Ht 5' 5" (1.651 m)   Wt 128 kg (282 lb 3 oz)   LMP  (LMP Unknown)   SpO2 98%   Breastfeeding? No   BMI 46.96 kg/m²     Diet: Clear Liquid  No gtts or drains    Urine Output: Urinary Catheter  cc/hour     Labs/Accuchecks: daily labs.    Skin: skin tear to left forearm. Skin is pink, skin tear is moist. Consult to wound care put in .         "

## 2019-09-17 NOTE — PT/OT/SLP PROGRESS
Speech Language Pathology Treatment    Patient Name:  Zaira Gupta   MRN:  [id number]  Admitting Diagnosis: Small bowel perforation    Recommendations:                 General Recommendations:  Dysphagia therapy and Modified barium swallow study  Diet recommendations:  NPO, Liquid Diet Level: (meds crushed in honey-thick liquid)   Aspiration Precautions: Frequent oral care, Meds crushed in honey-thick liquid and administered via tsp and Strict aspiration precautions   General Precautions: Standard, aspiration, NPO  Communication strategies:  go to room if call light pushed    Subjective     Patient awake but lethargic during session  Communicated with nurse prior to sesssion     Pain/Comfort:  · Pain Rating 1: 0/10  · Pain Rating Post-Intervention 1: 0/10    Objective:     Has the patient been evaluated by SLP for swallowing?   Yes  Keep patient NPO? Yes   Current Respiratory Status: nasal cannula      Patient seen for ongoing swallow assessment. She was sitting up in bed during session. She reported improved soreness in her throat, but her vocal quality was diminished from yesterday. Patient with congested breathing sounds which partially cleared with cued throat clear, but she was unable to produce a sufficient throat clear for completely clearing secretions. During trials of ice chip x1 and thin liquid x1 (via tsp), patient exhibited immediate coughing requiring oral suctioning. She demonstrated improved tolerance of nectar-thick liquids x3 (via cup-edge) and puree x2 (tsp pudding), but following final trial of each she demonstrated significant coughing. She tolerated honey-thick liquids x3 (via tsp) with no overt signs of aspiration. Given her swallowing difficulties, SLP recommending a MBSS in order to objectively assess swallow. SLP educated patient regarding POC and she verbalized understanding. Patient left in room with call light within reach. Diet recs communicated to treatment team.     Assessment:      Zaira Gupta is a 58 y.o. female with an SLP diagnosis of Dysphagia.      Goals:   Multidisciplinary Problems     SLP Goals        Problem: SLP Goal    Goal Priority Disciplines Outcome   SLP Goal     SLP    Description:  Speech-Language Pathology Goals  Goals to be met by 9/23/19  1. Patient will participate in ongoing swallow assessment in order to determine appropriateness of diet initiation.  2. Patient will participate in a modified barium swallow study in order to objectively assess swallow function.  3. Educate patient regarding risks/warning signs of aspiration.                      Plan:     · Patient to be seen:  5 x/week   · Plan of Care expires:  10/16/19  · Plan of Care reviewed with:  patient   · SLP Follow-Up:  Yes       Discharge recommendations:  (pending)   Barriers to Discharge:  Level of Skilled Assistance Needed     Time Tracking:     SLP Treatment Date:   09/17/19  Speech Start Time:  1139  Speech Stop Time:  1157     Speech Total Time (min):  18 min    Billable Minutes: Treatment Swallowing Dysfunction 10 and Self Care/Home Management Training 8    Clem Wyhte CCC-SLP  Speech-Language Pathology  Pager: [phone]   09/17/2019

## 2019-09-17 NOTE — PT/OT/SLP EVAL
Occupational Therapy   Evaluation    Name: Zaira Gupta  MRN: [id number]  Admitting Diagnosis:  Small bowel perforation 4 Days Post-Op    Recommendations:     Discharge Recommendations: home  Discharge Equipment Recommendations:  (TBD)  Barriers to discharge:  Decreased caregiver support    Assessment:     Zaira Gupta is a 58 y.o. female with a medical diagnosis of Small bowel perforation.  She was able to perform supine/sit and sit/stand T/F c min A x2.  B UE are WFL.  Able to perform UB/LB dressing c total assist.  Returned to supine 2* pt fatigue. Performance deficits affecting function: weakness, impaired endurance, impaired self care skills.      Rehab Prognosis: Good; patient would benefit from acute skilled OT services to address these deficits and reach maximum level of function.       Plan:     Patient to be seen 4 x/week to address the above listed problems via self-care/home management, therapeutic activities, therapeutic exercises  · Plan of Care Expires:    · Plan of Care Reviewed with: patient    Subjective     Chief Complaint: Pt is s/p ex-lap  Patient/Family Comments/goals: To get better.    Occupational Profile:  Living Environment: Pt lives in an apartment complex on the first floor c [address].  Has a tub/shower combo c a shower chair built in.  Previous level of function: I PTA  Equipment Used at Home:  shower chair  Assistance upon Discharge: Mother     Pain/Comfort:  · Pain Rating 1: 0/10    Patients cultural, spiritual, Oriental orthodox conflicts given the current situation: no    Objective:     Communicated with: RN prior to session.  Patient found supine with arterial line, blood pressure cuff, russ catheter, oxygen, peripheral IV, pulse ox (continuous), telemetry, wound vac upon OT entry to room.    General Precautions: Standard, aspiration, NPO   Orthopedic Precautions:N/A   Braces: N/A     Occupational Performance:    Bed Mobility:    · Patient completed Supine to Sit with moderate  assistance  · Patient completed Sit to Supine with moderate assistance    Functional Mobility/Transfers:  · Patient completed Sit <> Stand Transfer with minimum assistance  with  hand-held assist   · Functional Mobility: Pt was able to take 2-3 sidesteps to HOB.    Activities of Daily Living:  · Upper Body Dressing: total assistance to don hospital gown.  · Lower Body Dressing: total assistance to don socks.    Cognitive/Visual Perceptual:  Cognitive/Psychosocial Skills:     -       Oriented to: Person, Place, Time and Situation   -       Follows Commands/attention:Follows multistep  commands    Physical Exam:  Upper Extremity Range of Motion:     -       Right Upper Extremity: WFL  -       Left Upper Extremity: WFL  Upper Extremity Strength:    -       Right Upper Extremity: WFL  -       Left Upper Extremity: WFL    AMPAC 6 Click ADL:  AMPAC Total Score: 13      Education:    Patient left supine with all lines intact, call button in reach and RN notified    GOALS:   Multidisciplinary Problems     Occupational Therapy Goals        Problem: Occupational Therapy Goal    Goal Priority Disciplines Outcome Interventions   Occupational Therapy Goal     OT, PT/OT     Description:  Goals to be met by: 10/1/19     Patient will increase functional independence with ADLs by performing:    UE Dressing with [place].  LE Dressing with Modified [place] and Assistive Devices as needed.  Grooming while standing at sink with Modified [place].  Toileting from toilet with Modified [place] for hygiene and clothing management.   Bathing from  edge of bed with Modified [place].  Toilet transfer to toilet with Modified [place].  Increased functional strength to WFL for B UE.  Upper extremity exercise program x15 reps per handout, with independence.                      History:     Past Medical History:   Diagnosis Date    Acid reflux     Anemia     Arthritis     Autoimmune hepatitis 10/23/2012      Ref.  Range 2012 09:38  BRNAT Latest Range: Neg <1:160  Pos, Titer to follow (A)  BRANT HEP-2 Titer Latest Range: Neg <1:160 titer Pos 1:320 (A)  BRANT Hep-2 Pattern No range found Homogeneous (A)  Anti-SSA Antibody Latest Range: <20 EU 1.68  Anti-SSA Interpretation Latest Range: Negative  Negative  Anti-SSB Antibody Latest Range: <20 EU 1.76  Anti-SSB Interpretation Latest Range: Negative  Negativ    Cirrhosis     Dry mouth     Esophageal varix bleeding     Hypothyroidism     Obesity     Thrombocytopenia        Past Surgical History:   Procedure Laterality Date    BLADDER SURGERY  in      SECTION, CLASSIC      DILATION AND CURETTAGE OF UTERUS   MAB    EGD (ESOPHAGOGASTRODUODENOSCOPY) N/A 11/15/2013    Performed by [facility]un [facility]man [facility] at [facility] (2ND FLR)    ESOPHAGOGASTRODUODENOSCOPY (EGD) N/A 2015    Performed by Luis Bogran-Reyes, MD at [facility] END[facility]    LAPAROTOMY, EXPLORATORY N/A 2019    Performed by Zay Sánchez MD at [facility] OR 2ND FLR    TIPS PROCEDURE      TIPS revision  2012    TONSILLECTOMY, ADENOIDECTOMY         Time Tracking:     OT Date of Treatment: 19  OT Start Time: 1041  OT Stop Time: 1103  OT Total Time (min): 22 min    Billable Minutes:Evaluation 11  Therapeutic Activity 11    Carson Cobb, LOTR  2019

## 2019-09-17 NOTE — SUBJECTIVE & OBJECTIVE
Interval History: No acute events overnight. No flatus. Some nausea.    Medications:  Continuous Infusions:    Scheduled Meds:   famotidine (PF)  20 mg Intravenous Q12H     PRN Meds:benzocaine, calcium gluconate IVPB, lidocaine, magnesium sulfate IVPB, magnesium sulfate IVPB, morphine, morphine, ondansetron, ondansetron, potassium chloride in water **AND** potassium chloride in water **AND** potassium chloride in water, potassium chloride in water **AND** potassium chloride in water **AND** potassium chloride in water, promethazine (PHENERGAN) IVPB, sodium chloride 0.9%, sodium phosphate IVPB, sodium phosphate IVPB, sodium phosphate IVPB     Review of patient's allergies indicates:   Allergen Reactions    Aspirin (bulk) Other (See Comments)    Heparin analogues Other (See Comments)     Difficulty to arouse    Nsaids (non-steroidal anti-inflammatory drug)      Objective:     Vital Signs (Most Recent):  Temp: 98.3 °F (36.8 °C) (09/17/19 0300)  Pulse: 105 (09/17/19 0615)  Resp: 19 (09/17/19 0615)  BP: 139/68 (09/17/19 0400)  SpO2: 98 % (09/17/19 0615) Vital Signs (24h Range):  Temp:  [97.7 °F (36.5 °C)-98.3 °F (36.8 °C)] 98.3 °F (36.8 °C)  Pulse:  [] 105  Resp:  [14-38] 19  SpO2:  [87 %-100 %] 98 %  BP: ()/(45-68) 139/68     Weight: 128 kg (282 lb 3 oz)  Body mass index is 46.96 kg/m².    Intake/Output - Last 3 Shifts       09/15 0700 - 09/16 0659 09/16 0700 - 09/17 0659    I.V. (mL/kg) 1653 (12.2) 428 (3.3)    Total Intake(mL/kg) 1653 (12.2) 428 (3.3)    Urine (mL/kg/hr) 1605 (0.5) 3655 (1.2)    Total Output 1605 3655    Net +48 -8000                Physical Exam   Constitutional: She is oriented to person, place, and time. No distress.   HENT:   Head: Normocephalic and atraumatic.   Cardiovascular: Normal rate.   Pulmonary/Chest: Effort normal.   Abdominal: Soft. She exhibits no distension. There is tenderness (attp). There is no guarding.   Neurological: She is alert and oriented to person, place,  and time.   Psychiatric: She has a normal mood and affect. Her behavior is normal.       Significant Labs:  CBC:   Recent Labs   Lab 09/16/19  1136   WBC 3.76*   RBC 2.18*   HGB 7.7*   HCT 25.0*   PLT 44*   *   MCH 35.3*   MCHC 30.8*     CMP:   Recent Labs   Lab 09/16/19  1136   GLU 84   CALCIUM 8.0*   ALBUMIN 1.6*   PROT 4.2*      K 4.7   CO2 24   *   BUN 51*   CREATININE 1.1   ALKPHOS 82   ALT 29   AST 70*   BILITOT 3.9*     No results for input(s): LACTIC in the last 72 hours.       Significant Diagnostics:  I have reviewed all pertinent imaging results/findings within the past 24 hours.

## 2019-09-17 NOTE — SUBJECTIVE & OBJECTIVE
Interval History/Significant Events: CARL PATEL. PICC line placed yesterday, lost IV access again this morning. New consult placed. Stable for stepdown.    Follow-up For: Procedure(s) (LRB):  LAPAROTOMY, EXPLORATORY (N/A)    Post-Operative Day: 2 Days Post-Op    Objective:     Vital Signs (Most Recent):  Temp: 98.3 °F (36.8 °C) (09/17/19 0300)  Pulse: 105 (09/17/19 0615)  Resp: 19 (09/17/19 0615)  BP: 139/68 (09/17/19 0400)  SpO2: 98 % (09/17/19 0615) Vital Signs (24h Range):  Temp:  [97.8 °F (36.6 °C)-98.3 °F (36.8 °C)] 98.3 °F (36.8 °C)  Pulse:  [] 105  Resp:  [14-38] 19  SpO2:  [87 %-100 %] 98 %  BP: ()/(45-68) 139/68     Weight: 128 kg (282 lb 3 oz)  Body mass index is 46.96 kg/m².      Intake/Output Summary (Last 24 hours) at 9/17/2019 0756  Last data filed at 9/17/2019 0500  Gross per 24 hour   Intake 428 ml   Output 3555 ml   Net -3127 ml       Physical Exam   Constitutional:   Obese female lying in bed   Cardiovascular: Normal rate, regular rhythm, normal heart sounds and intact distal pulses.   Pulmonary/Chest: Effort normal and breath sounds normal.   Abdominal: Soft. Bowel sounds are normal. She exhibits no distension. There is tenderness (incisional ).   Midline incision clean/dry/intact   Skin: Skin is warm and dry. Capillary refill takes less than 2 seconds.   Nursing note and vitals reviewed.      Vents:       Lines/Drains/Airways     Drain                 Urethral Catheter 09/13/19 0006  16 Fr. 4 days          Peripheral Intravenous Line                 Midline Catheter Insertion/Assessment  - Single Lumen 09/16/19 1032 Right brachial vein 18g x 10cm less than 1 day         Peripheral IV - Single Lumen 09/16/19 1033 20 G;1 3/4 in Right;Anterior Forearm less than 1 day                Significant Labs:    CBC/Anemia Profile:  Recent Labs   Lab 09/16/19  1136   WBC 3.76*   HGB 7.7*   HCT 25.0*   PLT 44*   *   RDW 19.3*        Chemistries:  Recent Labs   Lab 09/16/19  1136      K  4.7   *   CO2 24   BUN 51*   CREATININE 1.1   CALCIUM 8.0*   ALBUMIN 1.6*   PROT 4.2*   BILITOT 3.9*   ALKPHOS 82   ALT 29   AST 70*   MG 2.4   PHOS 3.1       Significant Imaging:  I have reviewed all pertinent imaging results/findings within the past 24 hours.

## 2019-09-18 PROBLEM — R23.9 ALTERATION IN SKIN INTEGRITY: Status: ACTIVE | Noted: 2019-01-01

## 2019-09-18 NOTE — SUBJECTIVE & OBJECTIVE
Interval History: No acute events overnight. Feels less bloated. Per nursing and patient, she is passing a lot of gas. No BM yet but has urge.    Medications:  Continuous Infusions:   dextrose 5 % 75 mL/hr at 09/18/19 0800     Scheduled Meds:   famotidine (PF)  20 mg Intravenous Q12H     PRN Meds:benzocaine, calcium gluconate IVPB, lidocaine, magnesium sulfate IVPB, magnesium sulfate IVPB, morphine, morphine, ondansetron, ondansetron, potassium chloride in water **AND** potassium chloride in water **AND** potassium chloride in water, promethazine (PHENERGAN) IVPB, sodium chloride 0.9%, sodium phosphate IVPB, sodium phosphate IVPB, sodium phosphate IVPB     Review of patient's allergies indicates:   Allergen Reactions    Aspirin (bulk) Other (See Comments)    Heparin analogues Other (See Comments)     Difficulty to arouse    Nsaids (non-steroidal anti-inflammatory drug)      Objective:     Vital Signs (Most Recent):  Temp: 97.7 °F (36.5 °C) (09/18/19 0302)  Pulse: 109 (09/18/19 0645)  Resp: 18 (09/18/19 0645)  BP: (!) 129/59 (09/18/19 0600)  SpO2: (!) 94 % (09/18/19 0645) Vital Signs (24h Range):  Temp:  [97.7 °F (36.5 °C)-98 °F (36.7 °C)] 97.7 °F (36.5 °C)  Pulse:  [] 109  Resp:  [14-41] 18  SpO2:  [92 %-100 %] 94 %  BP: ()/(51-72) 129/59     Weight: 129.5 kg (285 lb 7.9 oz)  Body mass index is 47.51 kg/m².    Intake/Output - Last 3 Shifts       09/16 0700 - 09/17 0659 09/17 0700 - 09/18 0659 09/18 0700 - 09/19 0659    I.V. (mL/kg) 428 (3.3) 1504 (11.6) 150 (1.2)    Total Intake(mL/kg) 428 (3.3) 1504 (11.6) 150 (1.2)    Urine (mL/kg/hr) 3655 (1.2) 1900 (0.6)     Other  75     Stool  0     Total Output 3655 1975     Net -3227 -471 +150           Stool Occurrence  0 x           Physical Exam   Constitutional: She is oriented to person, place, and time. No distress.   HENT:   Head: Normocephalic and atraumatic.   Cardiovascular: Normal rate.   Pulmonary/Chest: Effort normal.   Abdominal: Soft. She  exhibits no distension. There is tenderness (attp). There is no guarding.   Incisional wound vac in place   Neurological: She is alert and oriented to person, place, and time.   Psychiatric: She has a normal mood and affect. Her behavior is normal.       Significant Labs:  CBC:   Recent Labs   Lab 09/18/19 0321   WBC 3.78*   RBC 2.25*   HGB 7.8*   HCT 26.6*   PLT 45*   *   MCH 34.7*   MCHC 29.3*     CMP:   Recent Labs   Lab 09/18/19 0321   GLU 75   CALCIUM 8.9   ALBUMIN 1.8*   PROT 4.7*   *   K 4.5   CO2 25   *   BUN 46*   CREATININE 1.0   ALKPHOS 90   ALT 34   AST 85*   BILITOT 4.3*     No results for input(s): LACTIC in the last 72 hours.       Significant Diagnostics:  I have reviewed all pertinent imaging results/findings within the past 24 hours.

## 2019-09-18 NOTE — PLAN OF CARE
09/18/19 1302   Post-Acute Status   Post-Acute Authorization Home Health/Hospice   Home Health/Hospice Status Awaiting Orders for HH   SW met with pt at the bedside to discuss therapy recs for HH. Pt chose [facility]. SW will send referral once home health orders are written.    Dinah Burgos LMSW  [facility]  [phone]

## 2019-09-18 NOTE — PLAN OF CARE
Problem: SLP Goal  Goal: SLP Goal  Speech-Language Pathology Goals  Goals to be met by 9/23/19  1. Patient will participate in ongoing swallow assessment in order to determine appropriateness of diet initiation.  2. Patient will participate in a modified barium swallow study in order to objectively assess swallow function.  3. Educate patient regarding risks/warning signs of aspiration.     Patient seen for ongoing swallow assessment. Patient scheduled for modified barium swallow study today, but given the amount of coughing she is exhibiting with all PO intake she would be inappropriate for initiation of PO diet regardless of results of swallow study. SLP recommending deferral of MBSS for the time being. She presents with a moderate amount of dried blood/dead skin in posterior of oral cavity which could be contributing to consistent coughing when swallowing. Additionally, patient continues to refer to a swollen thyroid that was present prior to admit and may warrant an ENT consult. At this point, SLP recommending continued NPO with ice chips and small sips of clear liquids sparingly with strict aspiration precautions.     Clem Whyte CCC-SLP  Speech-Language Pathology  Pager: [phone]

## 2019-09-18 NOTE — SUBJECTIVE & OBJECTIVE
Interval History/Significant Events: Agitation overnight, trying to climb out of bed. Received 0.5mg haldol. Otherwise NAEON. Awaiting [facility] bed. Denies nausea. Passing flatus, no bowel movements.     Follow-up For: Procedure(s) (LRB):  LAPAROTOMY, EXPLORATORY (N/A)    Post-Operative Day: 2 Days Post-Op    Objective:     Vital Signs (Most Recent):  Temp: 97.7 °F (36.5 °C) (09/18/19 0302)  Pulse: 109 (09/18/19 0645)  Resp: 18 (09/18/19 0645)  BP: (!) 129/59 (09/18/19 0600)  SpO2: (!) 94 % (09/18/19 0645) Vital Signs (24h Range):  Temp:  [97.7 °F (36.5 °C)-98 °F (36.7 °C)] 97.7 °F (36.5 °C)  Pulse:  [] 109  Resp:  [14-41] 18  SpO2:  [92 %-100 %] 94 %  BP: ()/(51-72) 129/59     Weight: 129.5 kg (285 lb 7.9 oz)  Body mass index is 47.51 kg/m².      Intake/Output Summary (Last 24 hours) at 9/18/2019 0740  Last data filed at 9/18/2019 0600  Gross per 24 hour   Intake 1504 ml   Output 1875 ml   Net -371 ml       Physical Exam   Constitutional:   Obese female lying in bed   Cardiovascular: Normal rate, regular rhythm, normal heart sounds and intact distal pulses.   Pulmonary/Chest: Effort normal and breath sounds normal.   Abdominal: Soft. Bowel sounds are normal. She exhibits no distension. There is tenderness (incisional ).   Midline incision clean/dry/intact   Skin: Skin is warm and dry. Capillary refill takes less than 2 seconds.   Nursing note and vitals reviewed.      Vents:       Lines/Drains/Airways     Drain                 Urethral Catheter 09/13/19 0006  16 Fr. 5 days          Peripheral Intravenous Line                 Midline Catheter Insertion/Assessment  - Single Lumen 09/16/19 1032 Right brachial vein 18g x 10cm 1 day         Peripheral IV - Single Lumen 09/16/19 1033 20 G;1 3/4 in Right;Anterior Forearm 1 day                Significant Labs:    CBC/Anemia Profile:  Recent Labs   Lab 09/16/19  1136 09/17/19  1038 09/18/19  0321   WBC 3.76* 3.69* 3.78*   HGB 7.7* 8.0* 7.8*   HCT 25.0* 25.8* 26.6*    PLT 44* 43* 45*   * 115* 118*   RDW 19.3* 19.7* 19.8*        Chemistries:  Recent Labs   Lab 09/16/19  1136 09/17/19  1038 09/18/19  0321    142 147*   K 4.7 4.8 4.5   * 110 114*   CO2 24 27 25   BUN 51* 50* 46*   CREATININE 1.1 1.1 1.0   CALCIUM 8.0* 8.7 8.9   ALBUMIN 1.6* 1.7* 1.8*   PROT 4.2* 4.7* 4.7*   BILITOT 3.9* 4.2* 4.3*   ALKPHOS 82 89 90   ALT 29 33 34   AST 70* 78* 85*   MG 2.4 2.4 2.4   PHOS 3.1 3.4 3.2       Significant Imaging:  I have reviewed all pertinent imaging results/findings within the past 24 hours.

## 2019-09-18 NOTE — PLAN OF CARE
Problem: Adult Inpatient Plan of Care  Goal: Plan of Care Review         Outcome: Ongoing (interventions implemented as appropriate)  Patient free of falls/traumas/injuries. Plan of care discussed with patient.  Neuro: AAOx4, follows commands.  Cardiac: SR-ST  on telemetry with HR in 90-100s, Maps >65 SBP <180.  Respiratory: 2L O2 via NC with SpO2 100%  GI/: Weinberg in place with UOP 75-125ml/hr  MIVF 75ml/hr, wound vac with 75ml brown drainage overnight.   Pt to step down, awaiting bed assignment. Barium swallow planned for today.

## 2019-09-18 NOTE — PROGRESS NOTES
[facility]  General Surgery  Progress Note    Subjective:     History of Present Illness:  No notes on file    Post-Op Info:  Procedure(s) (LRB):  LAPAROTOMY, EXPLORATORY (N/A)   5 Days Post-Op     Interval History: No acute events overnight. Feels less bloated. Per nursing and patient, she is passing a lot of gas. No BM yet but has urge.    Medications:  Continuous Infusions:   dextrose 5 % 75 mL/hr at 09/18/19 0800     Scheduled Meds:   famotidine (PF)  20 mg Intravenous Q12H     PRN Meds:benzocaine, calcium gluconate IVPB, lidocaine, magnesium sulfate IVPB, magnesium sulfate IVPB, morphine, morphine, ondansetron, ondansetron, potassium chloride in water **AND** potassium chloride in water **AND** potassium chloride in water, promethazine (PHENERGAN) IVPB, sodium chloride 0.9%, sodium phosphate IVPB, sodium phosphate IVPB, sodium phosphate IVPB     Review of patient's allergies indicates:   Allergen Reactions    Aspirin (bulk) Other (See Comments)    Heparin analogues Other (See Comments)     Difficulty to arouse    Nsaids (non-steroidal anti-inflammatory drug)      Objective:     Vital Signs (Most Recent):  Temp: 97.7 °F (36.5 °C) (09/18/19 0302)  Pulse: 109 (09/18/19 0645)  Resp: 18 (09/18/19 0645)  BP: (!) 129/59 (09/18/19 0600)  SpO2: (!) 94 % (09/18/19 0645) Vital Signs (24h Range):  Temp:  [97.7 °F (36.5 °C)-98 °F (36.7 °C)] 97.7 °F (36.5 °C)  Pulse:  [] 109  Resp:  [14-41] 18  SpO2:  [92 %-100 %] 94 %  BP: ()/(51-72) 129/59     Weight: 129.5 kg (285 lb 7.9 oz)  Body mass index is 47.51 kg/m².    Intake/Output - Last 3 Shifts       09/16 0700 - 09/17 0659 09/17 0700 - 09/18 0659 09/18 0700 - 09/19 0659    I.V. (mL/kg) 428 (3.3) 1504 (11.6) 150 (1.2)    Total Intake(mL/kg) 428 (3.3) 1504 (11.6) 150 (1.2)    Urine (mL/kg/hr) 3655 (1.2) 1900 (0.6)     Other  75     Stool  0     Total Output 3655 1975     Net -8443 -481 +150           Stool Occurrence  0 x           Physical  Exam   Constitutional: She is oriented to person, place, and time. No distress.   HENT:   Head: Normocephalic and atraumatic.   Cardiovascular: Normal rate.   Pulmonary/Chest: Effort normal.   Abdominal: Soft. She exhibits no distension. There is tenderness (attp). There is no guarding.   Incisional wound vac in place   Neurological: She is alert and oriented to person, place, and time.   Psychiatric: She has a normal mood and affect. Her behavior is normal.       Significant Labs:  CBC:   Recent Labs   Lab 09/18/19 0321   WBC 3.78*   RBC 2.25*   HGB 7.8*   HCT 26.6*   PLT 45*   *   MCH 34.7*   MCHC 29.3*     CMP:   Recent Labs   Lab 09/18/19 0321   GLU 75   CALCIUM 8.9   ALBUMIN 1.8*   PROT 4.7*   *   K 4.5   CO2 25   *   BUN 46*   CREATININE 1.0   ALKPHOS 90   ALT 34   AST 85*   BILITOT 4.3*     No results for input(s): LACTIC in the last 72 hours.       Significant Diagnostics:  I have reviewed all pertinent imaging results/findings within the past 24 hours.    Assessment/Plan:     * Small bowel perforation  58F s/p 9/13 Ex-Lap and small bowel resection.    -Okay for transfer to PCU  - Ok for sips and ice chips due to increased flatus  - bowel regimen  - Will check ammonia this morning  - MIVF  - IV pain control          Lien Arango MD  General Surgery  [facility]

## 2019-09-18 NOTE — PROGRESS NOTES
[facility]  Critical Care - Surgery  Progress Note    Patient Name: Zaira Gupta  MRN: [id number]  Admission Date: 9/13/2019  Hospital Length of Stay: 5 days  Code Status: Full Code  Attending Provider: Zay Sánchez MD  Primary Care Provider: Minna Canchola NP   Principal Problem: Small bowel perforation    Subjective:     Hospital/ICU Course:  9/13: Admit s/p small bowel resection for small bowel perforation. Extubated, HDS, not on pressors.   9/15: Started on CLD without issues. Passing flatus.   9/16: stable for step down. PICC line placed, patient lost IV access.  9/17: stable for step down. Lost PICC line overnight. New consult placed.  9/18:     Interval History/Significant Events: Agitation overnight, trying to climb out of bed. Received 0.5mg haldol. Otherwise NAEON. Awaiting [facility] bed. Denies nausea. Passing flatus, no bowel movements.     Follow-up For: Procedure(s) (LRB):  LAPAROTOMY, EXPLORATORY (N/A)    Post-Operative Day: 2 Days Post-Op    Objective:     Vital Signs (Most Recent):  Temp: 97.7 °F (36.5 °C) (09/18/19 0302)  Pulse: 109 (09/18/19 0645)  Resp: 18 (09/18/19 0645)  BP: (!) 129/59 (09/18/19 0600)  SpO2: (!) 94 % (09/18/19 0645) Vital Signs (24h Range):  Temp:  [97.7 °F (36.5 °C)-98 °F (36.7 °C)] 97.7 °F (36.5 °C)  Pulse:  [] 109  Resp:  [14-41] 18  SpO2:  [92 %-100 %] 94 %  BP: ()/(51-72) 129/59     Weight: 129.5 kg (285 lb 7.9 oz)  Body mass index is 47.51 kg/m².      Intake/Output Summary (Last 24 hours) at 9/18/2019 0740  Last data filed at 9/18/2019 0600  Gross per 24 hour   Intake 1504 ml   Output 1875 ml   Net -371 ml       Physical Exam   Constitutional:   Obese female lying in bed   Cardiovascular: Normal rate, regular rhythm, normal heart sounds and intact distal pulses.   Pulmonary/Chest: Effort normal and breath sounds normal.   Abdominal: Soft. Bowel sounds are normal. She exhibits no distension. There is tenderness (incisional ).   Midline  incision clean/dry/intact   Skin: Skin is warm and dry. Capillary refill takes less than 2 seconds.   Nursing note and vitals reviewed.      Vents:       Lines/Drains/Airways     Drain                 Urethral Catheter 09/13/19 0006  16 Fr. 5 days          Peripheral Intravenous Line                 Midline Catheter Insertion/Assessment  - Single Lumen 09/16/19 1032 Right brachial vein 18g x 10cm 1 day         Peripheral IV - Single Lumen 09/16/19 1033 20 G;1 3/4 in Right;Anterior Forearm 1 day                Significant Labs:    CBC/Anemia Profile:  Recent Labs   Lab 09/16/19  1136 09/17/19  1038 09/18/19  0321   WBC 3.76* 3.69* 3.78*   HGB 7.7* 8.0* 7.8*   HCT 25.0* 25.8* 26.6*   PLT 44* 43* 45*   * 115* 118*   RDW 19.3* 19.7* 19.8*        Chemistries:  Recent Labs   Lab 09/16/19  1136 09/17/19  1038 09/18/19  0321    142 147*   K 4.7 4.8 4.5   * 110 114*   CO2 24 27 25   BUN 51* 50* 46*   CREATININE 1.1 1.1 1.0   CALCIUM 8.0* 8.7 8.9   ALBUMIN 1.6* 1.7* 1.8*   PROT 4.2* 4.7* 4.7*   BILITOT 3.9* 4.2* 4.3*   ALKPHOS 82 89 90   ALT 29 33 34   AST 70* 78* 85*   MG 2.4 2.4 2.4   PHOS 3.1 3.4 3.2       Significant Imaging:  I have reviewed all pertinent imaging results/findings within the past 24 hours.    Assessment/Plan:     * Small bowel perforation  POD#5 SBR for small bowel perforation, recovering well in the ICU.    Neuro:   - morphine PRN  - haloperidol 0.5 mg q6h PRN for agitation    Pulmonary:   - 2L NC  - Pulmonary toilet  - IS    Cardiac:  - HDS not requiring pressors     Renal:   - UOP 1.8L, net negative 371mL  - Trend Bun/Cr      Fluids/Electrolytes/Nutrition/GI:   -Nutritional status: CLD nectar thick  -replace lytes PRN  - Stop NS, start d5w @ 75cc/hr for acute hypernatremia. Recheck BMP this afternoon.  - Denies nausea/vomiting. Passing flatus. No bowel movements yet. Place NGT if patient becomes nauseated.    Hematology/Oncology:  -Hgb stable   -Anticoagulation: severe allergy to  heparin/lovenox (asystole in past x2, note of allergy from heme/onc)     Infectious Disease:   -Afebrile   -WBC trending down     Endocrine:  -Glucose goal of 120-180    Dispo:  - awaiting bed in [facility]         Critical care was time spent personally by me on the following activities: development of treatment plan with patient or surrogate and bedside caregivers, discussions with consultants, evaluation of patient's response to treatment, examination of patient, ordering and performing treatments and interventions, ordering and review of laboratory studies, ordering and review of radiographic studies, pulse oximetry, re-evaluation of patient's condition.  This critical care time did not overlap with that of any other provider or involve time for any procedures.     Akash Beach MD  Critical Care - Surgery  [facility]

## 2019-09-18 NOTE — CONSULTS
Consult received on patient's left forearm. Left forearm purple discolored tissue with surrounding erythema and appears to be medical adhesive related skin injury. Purple discoloration 3cm x 3cm. Unknown etiology . Small amount of weeping noted.  Weeping noted as well over left hand with purple discolored tissue.    Recommendations:  Dress left forearm and left hand with foam dressing, change twice a week and as needed for saturation.  Left arm elevation with pillows.    Secure chat message sent to Dr. Arango. Nursing to continue care. Wound care to follow prn.    Left forearm purple discolored tissue with surrounding erythema and appears to be medical adhesive related skin injury. Purple discoloration 3cm x 3cm.    Left hand ecchymosis/purple discoloration

## 2019-09-18 NOTE — ASSESSMENT & PLAN NOTE
58F s/p 9/13 Ex-Lap and small bowel resection.    -Okay for transfer to PCU  - Ok for sips and ice chips due to increased flatus  - bowel regimen  - Will check ammonia this morning  - MIVF  - IV pain control

## 2019-09-18 NOTE — ASSESSMENT & PLAN NOTE
POD#5 SBR for small bowel perforation, recovering well in the ICU.    Neuro:   - morphine PRN  - haloperidol 0.5 mg q6h PRN for agitation    Pulmonary:   - 2L NC  - Pulmonary toilet  - IS    Cardiac:  - HDS not requiring pressors     Renal:   - UOP 1.8L, net negative 371mL  - Trend Bun/Cr      Fluids/Electrolytes/Nutrition/GI:   -Nutritional status: CLD nectar thick  -replace lytes PRN  - Stop NS, start d5w @ 75cc/hr for acute hypernatremia. Recheck BMP this afternoon.  - Denies nausea/vomiting. Passing flatus. No bowel movements yet. Place NGT if patient becomes nauseated.    Hematology/Oncology:  -Hgb stable   -Anticoagulation: severe allergy to heparin/lovenox (asystole in past x2, note of allergy from heme/onc)     Infectious Disease:   -Afebrile   -WBC trending down     Endocrine:  -Glucose goal of 120-180    Dispo:  - awaiting bed in [facility]

## 2019-09-18 NOTE — NURSING
"Pt trying to climb out of bed, removing oxygen and gown, throwing pillows on the ground. Patient difficult to consol with words and distractions. Pt states, "I want to get out, I want to go to my original room". Patient reoriented to place, time, and situation. Patient told that we are still waiting for a room on [facility]. Notified, MD Baylee. Per MD to give 0.5 mg haldol IV once. Will complete orders and continue to monitor.   "

## 2019-09-18 NOTE — PLAN OF CARE
Problem: Adult Inpatient Plan of Care  Goal: Plan of Care Review         Outcome: Ongoing (interventions implemented as appropriate)  Patient remained free from falls and injuries throughout shift. AAOx4. Pain well controlled with PRNs.Breath sounds diminished bilaterally. Wound vac in place to continuous therapy at 100.50cc serosanguinous output throughout shift. Abd binder in place. Bowel sounds hypoactive.  Out of bedx2 with PT/OT. All questions encouraged and answered.

## 2019-09-18 NOTE — PT/OT/SLP PROGRESS
"Speech Language Pathology Treatment    Patient Name:  Zaira Gupta   MRN:  [id number]  Admitting Diagnosis: Small bowel perforation    Recommendations:     General Recommendations:  Dysphagia therapy and ENT consult?  Diet recommendations:  NPO, Liquid Diet Level: (ice chips and single sips of clear liquids sparingly)   Aspiration Precautions: Frequent and thorough oral care, ice chips sparingly, sips of thin liquid sparingly and strict aspiration precautions  General Precautions: Standard, aspiration, fall  Communication strategies:  go to room if call light pushed    Subjective     Patient awake and alert during session  "I can feel it every time I swallow. It's so thick I can scrape it off with my fingernail." Patient referring to dead skin and dried blood in posterior oral cavity  Communicated with nurse prior to session     Pain/Comfort:  Pain Rating 1: 0/10  Pain Rating Post-Intervention 1: 0/10    Objective:     Has the patient been evaluated by SLP for swallowing?   Yes  Keep patient NPO? No   Current Respiratory Status: nasal cannula      Patient seen for ongoing swallow assessment. She was sitting up in bed during session. She reported continued discomfort in her throat and her vocal quality continued to be hoarse. Patient was originally scheduled for a MBSS, but given the amount of coughing she was exhibiting with each trial she would not be appropriate for diet initiation regardless of results of MBSS. During this session, patient exhibited coughing following trials of ice chips x2, thin liquid x1 (via cup-edge), nectar-thick liquids x2 (via cup-edge) and puree x1 (tsp pudding). Patient's swallow was timely with good laryngeal elevation across trials, but she consistently reported discomfort from build-up of dried blood/skin in posterior of oral cavity. SLP believes that this is most likely for coughing vs swallow dysfunction. Patient appropriate for ice chips and small sips of thin liquid for comfort " and to promote swallow function. SLP educated patient regarding POC and she verbalized understanding. Patient left in room with call light within reach. Diet recs communicated to treatment team.     Assessment:     Zaira Gupta is a 58 y.o. female with an SLP diagnosis of Dysphagia.      Goals:   Multidisciplinary Problems     SLP Goals        Problem: SLP Goal    Goal Priority Disciplines Outcome   SLP Goal     SLP    Description:  Speech-Language Pathology Goals  Goals to be met by 9/23/19  1. Patient will participate in ongoing swallow assessment in order to determine appropriateness of diet initiation.  2. Patient will participate in a modified barium swallow study in order to objectively assess swallow function.  3. Educate patient regarding risks/warning signs of aspiration.                      Plan:     · Patient to be seen:  5 x/week   · Plan of Care expires:  10/16/19  · Plan of Care reviewed with:  patient   · SLP Follow-Up:  Yes       Discharge recommendations:  (pending)   Barriers to Discharge:  Level of Skilled Assistance Needed     Time Tracking:     SLP Treatment Date:   09/18/19  Speech Start Time:  0845  Speech Stop Time:  0909     Speech Total Time (min):  24 min    Billable Minutes: Treatment Swallowing Dysfunction 15 and Self Care/Home Management Training 9    Clem Whyte CCC-SLP  Speech-Language Pathology  Pager: [phone]   09/18/2019

## 2019-09-19 PROBLEM — S31.109A OPEN WOUND OF ABDOMEN: Status: ACTIVE | Noted: 2019-01-01

## 2019-09-19 NOTE — PROGRESS NOTES
Contacted by RN that patient had dislodged wound vac dressing. RN removed vac and packed with wet to dry. Wound care reapplied wound vac, 2 black sponge, 100mmHg. Patient tolerated care well. Wound care to follow as needed.

## 2019-09-19 NOTE — PT/OT/SLP PROGRESS
"Speech Language Pathology Treatment    Patient Name:  Zaira Gupta   MRN:  [id number]  Admitting Diagnosis: Small bowel perforation    Recommendations:     General Recommendations:  Dysphagia therapy, Modified Barium Swallow Study and ENT consult?  Diet recommendations:  NPO, Liquid Diet Level: (sips of thin liquid with meds and ice chips sparingly)   Aspiration Precautions: Frequent and thorough oral care, ice chips sparingly, sips of thin liquid with meds and strict aspiration precautions  General Precautions: Standard, aspiration, fall  Communication strategies:  go to room if call light pushed    Subjective     Patient awake and alert during session  Patient with more confusion during this session  "Can I go back to my single room with the window?"  Communicated with nurse prior to session    Pain/Comfort:  Pain Rating 1: 0/10  Pain Rating Post-Intervention 1: 0/10    Objective:     Has the patient been evaluated by SLP for swallowing?   Yes  Keep patient NPO? No   Current Respiratory Status: nasal cannula      Patient seen for ongoing swallow assessment. She was sitting up in bed with RN and sister present during portion of session. She reported continued discomfort in her throat and her vocal quality continued to be hoarse. Patient initially tolerated sips of thin liquid x3 (via cup-edge) with no signs of aspiration, but with further trials patient exhibited consistent coughing that has been present over the past several days. She had particular difficulty with puree trials x1 (tsp pudding) which caused immediate significant coughing with eventual expectoration of bolus. Patient took meds x3 with small sips of water with mild coughing following each. ~5 minutes following final med, patient regurgitated capsule along with water. Patient's swallow was timely with good laryngeal elevation across trials, but she consistently reported discomfort from build-up of dried blood/skin in posterior of oral cavity. SLP " believes that this is most likely for coughing vs swallow dysfunction. SLP performed oral care using mouthwash and sponge and removed a significant amount of dried skin from tongue and oral cavity. Patient appropriate for small sips of thin liquid with meds and ice chips sparingly. SLP educated patient and family regarding POC and they verbalized understanding. Patient left in room with call light within reach. Diet recs communicated to treatment team. MBSS scheduled for 9/20/19 in order to determine least restrictive diet.     Assessment:     Zaira Gupta is a 58 y.o. female with an SLP diagnosis of Dysphagia.      Goals:   Multidisciplinary Problems     SLP Goals        Problem: SLP Goal    Goal Priority Disciplines Outcome   SLP Goal     SLP    Description:  Speech-Language Pathology Goals  Goals to be met by 9/23/19  1. Patient will participate in ongoing swallow assessment in order to determine appropriateness of diet initiation.  2. Patient will participate in a modified barium swallow study in order to objectively assess swallow function.  3. Educate patient regarding risks/warning signs of aspiration.                      Plan:     · Patient to be seen:  5 x/week   · Plan of Care expires:  10/16/19  · Plan of Care reviewed with:  patient, sibling   · SLP Follow-Up:  Yes       Discharge recommendations:  (pending)   Barriers to Discharge:  Level of Skilled Assistance Needed     Time Tracking:     SLP Treatment Date:   09/19/19  Speech Start Time:  0910  Speech Stop Time:  1001     Speech Total Time (min):  51 min    Billable Minutes: Treatment Swallowing Dysfunction 31 and Self Care/Home Management Training 20    Clem Whyte CCC-SLP  Speech-Language Pathology  Pager: [phone]   09/19/2019

## 2019-09-19 NOTE — PLAN OF CARE
Problem: Adult Inpatient Plan of Care  Goal: Plan of Care Review         Outcome: Ongoing (interventions implemented as appropriate)  Pt remained free of falls and injuries throughout the shift. POC reviewed with pt.   Neuro: AAOx4 with slight episodes of confusion but is still oriented to person, place and time   Respiratory: 2L NC SPO2 100%   Cardiac: Tele in placed SR-ST, HR 90's -110   GI/: Weinberg to gravity in placed. UO adequate  Midline Catheter in placed along with 18 G R FA with a small area of bruising noted at the base  Wound Vac in placed at set @ 100 with serosanguinous drainage noted.  No acute changes noted, pt left in comfort, bed in low position, call light within reach. WCTM

## 2019-09-19 NOTE — PLAN OF CARE
10:00 AM Per JUAN Noriega, patient has failed swallow test, and is questioning whether she is still appropriate for discharging home w/HH, Home [facility]? CM will check w/PT/OT today. JENY Montgomery, updated.     10:15 AM ALLY spoke to assigned PT, Kaye IRAHETA/pager [phone], regarding above. She will re-eval & get back to CM.     11:30 AM Kaye IRAHETA, PT, called to say PT recs now changed to SNF.     1:15 PM Update JENY Montgomery, on above.     4:00 PM ALLY spoke to patients' daughter,   Maddie Shaw Daughter     [phone]     Updating her on patient agreeing to D/c plans: Plan A: OSNF and Plan B: [facility], w[facility], should [facility] not accept patient. She verbalized her understanding & states she is available to help her mother, by coming & going helping her, as well as, patients' sisters x2, are available to help, too.

## 2019-09-19 NOTE — SUBJECTIVE & OBJECTIVE
Interval History:   Patient seen and examined, no acute events overnight  Denies N/V  +F/no BM  Wound vac put out 90mL overnight  Intermittent tachycardia, afebrile    Medications:  Continuous Infusions:    Scheduled Meds:   docusate  100 mg Per NG tube Daily    famotidine  20 mg Per NG tube BID    levothyroxine  175 mcg Per NG tube Daily    polyethylene glycol  17 g Per NG tube BID    rifAXIMin  550 mg Per NG tube BID     PRN Meds:benzocaine, calcium gluconate IVPB, lidocaine, magnesium sulfate IVPB, magnesium sulfate IVPB, morphine, morphine, ondansetron, ondansetron, potassium chloride in water **AND** potassium chloride in water **AND** potassium chloride in water, promethazine (PHENERGAN) IVPB, sodium chloride 0.9%, sodium phosphate IVPB, sodium phosphate IVPB, sodium phosphate IVPB     Review of patient's allergies indicates:   Allergen Reactions    Aspirin (bulk) Other (See Comments)    Heparin analogues Other (See Comments)     Difficulty to arouse    Nsaids (non-steroidal anti-inflammatory drug)      Objective:     Vital Signs (Most Recent):  Temp: 97.4 °F (36.3 °C) (09/19/19 0401)  Pulse: 102 (09/19/19 0732)  Resp: 17 (09/19/19 0401)  BP: 124/60 (09/19/19 0401)  SpO2: 95 % (09/19/19 0401) Vital Signs (24h Range):  Temp:  [97.4 °F (36.3 °C)-98.7 °F (37.1 °C)] 97.4 °F (36.3 °C)  Pulse:  [] 102  Resp:  [12-42] 17  SpO2:  [92 %-100 %] 95 %  BP: ()/(44-64) 124/60     Weight: (!) 137.8 kg (303 lb 12.7 oz)  Body mass index is 50.55 kg/m².    Intake/Output - Last 3 Shifts       09/17 0700 - 09/18 0659 09/18 0700 - 09/19 0659 09/19 0700 - 09/20 0659    P.O.  0     I.V. (mL/kg) 1504 (11.6) 675 (4.9)     Total Intake(mL/kg) 1504 (11.6) 675 (4.9)     Urine (mL/kg/hr) 1900 (0.6) 1665 (0.5)     Other 75 90     Stool 0 0     Total Output 1975 1755     Net -471 -1080            Stool Occurrence 0 x 0 x           Physical Exam   Constitutional: She appears well-developed and well-nourished. No distress.    HENT:   Head: Normocephalic and atraumatic.   Cardiovascular: Normal rate and regular rhythm.   Pulmonary/Chest: Effort normal. No respiratory distress.   Abdominal:   Soft, obese  Appropriate TTP  Wound vac in place       Significant Labs:  CBC:   Recent Labs   Lab 09/19/19  0340   WBC 4.02   RBC 2.54*   HGB 9.2*   HCT 30.1*   PLT 40*   *   MCH 36.2*   MCHC 30.6*     CMP:   Recent Labs   Lab 09/19/19  0230   GLU 78   CALCIUM 9.0   ALBUMIN 1.8*   PROT 4.9*   *   K 5.0   CO2 23   *   BUN 44*   CREATININE 0.9   ALKPHOS 94   ALT 36   *   BILITOT 4.5*

## 2019-09-19 NOTE — ASSESSMENT & PLAN NOTE
58F s/p 9/13 Ex-Lap and small bowel resection.    - regular diet  - PO pain/nausea meds  - bowel regimen  - GI prophylaxis  - PT/OT rec HH

## 2019-09-19 NOTE — PT/OT/SLP PROGRESS
"Physical Therapy Treatment    Patient Name:  Zaira Gupta   MRN:  [id number]    Recommendations:     Discharge Recommendations:  nursing facility, skilled   Discharge Equipment Recommendations: walker, rolling, commode   Barriers to discharge: Decreased caregiver support    Assessment:     Zaira Gupta is a 58 y.o. female admitted with a medical diagnosis of Small bowel perforation.  She presents with the following impairments/functional limitations:  weakness, impaired endurance, impaired functional mobilty, gait instability, decreased lower extremity function, decreased safety awareness, impaired cardiopulmonary response to activity . Pt limited with functional mobility due to difficulty with sit to stand from bedside chair and pt c/o LE weakness. Pt's ox sat 83% after gait with no oxygen, increased to 93% after seated rest with 2 LPM. D/C recommendation changed to SNF to work towards improved mobility.    Rehab Prognosis: Good; patient would benefit from acute skilled PT services to address these deficits and reach maximum level of function.    Recent Surgery: Procedure(s) (LRB):  LAPAROTOMY, EXPLORATORY (N/A) 6 Days Post-Op    Plan:     During this hospitalization, patient to be seen 4 x/week to address the identified rehab impairments via gait training, therapeutic exercises, therapeutic activities, neuromuscular re-education and progress toward the following goals:    · Plan of Care Expires:  10/16/19    Subjective   "My legs won't do it" (when trying to stand from the bedside chair)  Pain/Comfort:  · Pain Rating 1: (pt c/o pain in her abdomen with bed mobility, did not grade)  · Location - Orientation 1: generalized  · Location 1: abdomen  · Pain Addressed 1: Pre-medicate for activity  · Pain Rating Post-Intervention 1: 0/10(in sitting)      Objective:     Communicated with nurse prior to session.  Patient found supine with blood pressure cuff, pulse ox (continuous), telemetry, PICC line, wound vac, " oxygen upon PT entry to room.     General Precautions: Standard, aspiration, fall   Orthopedic Precautions:N/A   Braces: N/A     Functional Mobility:  · Bed Mobility:     · Supine to Sit: maximal assistance  · Transfers:     · Sit to Stand:  minimum assistance from bed with RW and moderate assistance from bedside chair with HHA  · Gait: 25ft then 15ft with RW with minimal assist with flexed trunk, decreased step length, and at slow pace. pt limited gait distance due to mild SOB and c/o LE weakness; pt's ox sat 83% after gait, 93% after sitting with 2LPM oxygen    AM-PAC 6 CLICK MOBILITY  Turning over in bed (including adjusting bedclothes, sheets and blankets)?: 2  Sitting down on and standing up from a chair with arms (e.g., wheelchair, bedside commode, etc.): 2  Moving from lying on back to sitting on the side of the bed?: 2  Moving to and from a bed to a chair (including a wheelchair)?: 2  Need to walk in hospital room?: 3  Climbing 3-5 steps with a railing?: 2  Basic Mobility Total Score: 13     Patient left up in chair with all lines intact, call button in reach, nurse notified and family present..    GOALS:   Multidisciplinary Problems     Physical Therapy Goals        Problem: Physical Therapy Goal    Goal Priority Disciplines Outcome Goal Variances Interventions   Physical Therapy Goal     PT, PT/OT Ongoing (interventions implemented as appropriate)     Description:  Goals to be met by: 2019    Patient will increase functional independence with mobility by performin. Supine <> sit with Supervision.  2. Sit <> stand transfer with Supervision using No Assistive Device and Rolling Walker.  3. Bed <> chair transfer via Stand Pivot with Supervision using No Assistive Device and Rolling Walker.  4. Gait  x 150 feet with Stand-by Assistance using No Assistive Device and Rolling Walker to prepare for community ambulation and endurance activities.  5. Able to tolerate exercise for 15-20 reps with  independence.                        Time Tracking:     PT Received On: 09/19/19  PT Start Time: 1155     PT Stop Time: 1226  PT Total Time (min): 31 min     Billable Minutes: Gait Training 20 and Therapeutic Activity 11    Treatment Type: Treatment  PT/PTA: PT           Kaye Marin, PT  09/19/2019

## 2019-09-19 NOTE — PLAN OF CARE
Problem: Adult Inpatient Plan of Care  Goal: Plan of Care Review         Outcome: Ongoing (interventions implemented as appropriate)  Patient is alert and oriented. Able to make needs known to staff. No c/o pain or discomfort noted. No s/s of respiratory/cardiac distress noted. Patient remains on continuous oxygen set at 2 liters/min via NC. Telemetry monitoring continues with ST noted. Abdominal incision continues to have a wound vac applied. Wound vac dressing is clean, dry, and intact. Right Midline is patent and flushes well. Patient is NPO except ice and meds. Weinberg catheter was discontinued today and patient is voiding without difficulty. VS stable. Patient continues to have a telesitter camera in her room to monitor her. No issues or concerns at this time. Continue to monitor.

## 2019-09-19 NOTE — PLAN OF CARE
Problem: SLP Goal  Goal: SLP Goal  Speech-Language Pathology Goals  Goals to be met by 9/23/19  1. Patient will participate in ongoing swallow assessment in order to determine appropriateness of diet initiation.  2. Patient will participate in a modified barium swallow study in order to objectively assess swallow function.  3. Educate patient regarding risks/warning signs of aspiration.     Patient seen for ongoing swallow assessment. SLP recommending NPO with small sips of thin liquids with meds and ice chips sparingly. Patient demonstrates better tolerance of thin liquids than she does with puree, but her consistent coughing makes it difficult to rule out aspiration at bedside. SLP recommending a modified barium swallow study in order to objectively assess swallow function. Additionally, patient endorses continued odynophagia and globus sensation and exhibits continued dysphonia which may make her appropriate for an ENT consult.     Clem Whyte CCC-SLP  Speech-Language Pathology  Pager: [phone]

## 2019-09-19 NOTE — PROGRESS NOTES
[facility]  General Surgery  Progress Note    Subjective:     Post-Op Info:  Procedure(s) (LRB):  LAPAROTOMY, EXPLORATORY (N/A)   6 Days Post-Op     Interval History:   Patient seen and examined, no acute events overnight  Denies N/V  +F/no BM  Wound vac put out 90mL overnight  Intermittent tachycardia, afebrile    Medications:  Continuous Infusions:    Scheduled Meds:   docusate  100 mg Per NG tube Daily    famotidine  20 mg Per NG tube BID    levothyroxine  175 mcg Per NG tube Daily    polyethylene glycol  17 g Per NG tube BID    rifAXIMin  550 mg Per NG tube BID     PRN Meds:benzocaine, calcium gluconate IVPB, lidocaine, magnesium sulfate IVPB, magnesium sulfate IVPB, morphine, morphine, ondansetron, ondansetron, potassium chloride in water **AND** potassium chloride in water **AND** potassium chloride in water, promethazine (PHENERGAN) IVPB, sodium chloride 0.9%, sodium phosphate IVPB, sodium phosphate IVPB, sodium phosphate IVPB     Review of patient's allergies indicates:   Allergen Reactions    Aspirin (bulk) Other (See Comments)    Heparin analogues Other (See Comments)     Difficulty to arouse    Nsaids (non-steroidal anti-inflammatory drug)      Objective:     Vital Signs (Most Recent):  Temp: 97.4 °F (36.3 °C) (09/19/19 0401)  Pulse: 102 (09/19/19 0732)  Resp: 17 (09/19/19 0401)  BP: 124/60 (09/19/19 0401)  SpO2: 95 % (09/19/19 0401) Vital Signs (24h Range):  Temp:  [97.4 °F (36.3 °C)-98.7 °F (37.1 °C)] 97.4 °F (36.3 °C)  Pulse:  [] 102  Resp:  [12-42] 17  SpO2:  [92 %-100 %] 95 %  BP: ()/(44-64) 124/60     Weight: (!) 137.8 kg (303 lb 12.7 oz)  Body mass index is 50.55 kg/m².    Intake/Output - Last 3 Shifts       09/17 0700 - 09/18 0659 09/18 0700 - 09/19 0659 09/19 0700 - 09/20 0659    P.O.  0     I.V. (mL/kg) 1504 (11.6) 675 (4.9)     Total Intake(mL/kg) 1504 (11.6) 675 (4.9)     Urine (mL/kg/hr) 1900 (0.6) 1665 (0.5)     Other 75 90     Stool 0 0     Total  Output 1975 1755     Net -471 -1080            Stool Occurrence 0 x 0 x           Physical Exam   Constitutional: She appears well-developed and well-nourished. No distress.   HENT:   Head: Normocephalic and atraumatic.   Cardiovascular: Normal rate and regular rhythm.   Pulmonary/Chest: Effort normal. No respiratory distress.   Abdominal:   Soft, obese  Appropriate TTP  Wound vac in place       Significant Labs:  CBC:   Recent Labs   Lab 09/19/19  0340   WBC 4.02   RBC 2.54*   HGB 9.2*   HCT 30.1*   PLT 40*   *   MCH 36.2*   MCHC 30.6*     CMP:   Recent Labs   Lab 09/19/19  0230   GLU 78   CALCIUM 9.0   ALBUMIN 1.8*   PROT 4.9*   *   K 5.0   CO2 23   *   BUN 44*   CREATININE 0.9   ALKPHOS 94   ALT 36   *   BILITOT 4.5*     Assessment/Plan:     * Small bowel perforation  58F s/p 9/13 Ex-Lap and small bowel resection.    - regular diet  - PO pain/nausea meds  - bowel regimen  - GI prophylaxis  - PT/OT rec HH      Hypothyroidism  Levothyroxine         Nhung Payton PA-C   [id number]  General Surgery  [facility]

## 2019-09-19 NOTE — NURSING TRANSFER
Nursing Transfer Note      9/19/2019     Transfer To: [facility] Room 1059 from SICU Room [id number]    Transfer via bed    Transfer with O2 tank and cardiac monitoring    Transported by RN and PCT    Medicines sent: none to be sent    Chart send with patient: Yes    Notified: patient    Patient reassessed at: 09/19/2019 @ 0350 by RN taking over care    Upon arrival to floor: cardiac monitor applied, patient oriented to room, call bell in reach and bed in lowest position

## 2019-09-19 NOTE — PROGRESS NOTES
Consult received on patient's abdominal wound vac dressing change.  Wound cleansed with sterile normal saline, cavilon applied to periwound, packed with 2 black sponge pieces, continue current setting at 100mmHg.  Patient tolerated care well.  Wound care to follow vac dressing changes, plan on changing twice a week. Nursing to continue care.     09/19/19 1143        Incision/Site 09/13/19 0733 Abdomen   Date First Assessed/Time First Assessed: 09/13/19 0733   Location: Abdomen   Wound Image    Incision WDL ex   Drainage Amount Moderate   Drainage Characteristics/Odor Serosanguineous;No odor   Appearance Moist;Red;Adipose   Periwound Area Intact   Wound Edges Open   Wound Length (cm) 19 cm   Wound Width (cm) 5 cm   Wound Depth (cm) 6.1 cm   Wound Volume (cm^3) 579.5 cm^3   Wound Surface Area (cm^2) 95 cm^2   Tunneling (depth (cm)/location) 5cm at 12 oclock   Care Cleansed with:;Sterile normal saline   Dressing Changed  (wound vac)   Periwound Care Skin barrier film applied   Dressing Change Due 09/23/19        Negative Pressure Wound Therapy    Placement Date/Time: 09/16/19 1400   Orientation: midline  Location: Abdomen   NPWT Type Vacuum Therapy   Therapy Setting NPWT Continuous therapy   Pressure Setting NPWT 100 mmHg   Therapy Interventions NPWT Dressing changed   Sponges Inserted NPWT 2;Black

## 2019-09-19 NOTE — PLAN OF CARE
Problem: Physical Therapy Goal  Goal: Physical Therapy Goal  Goals to be met by: 2019    Patient will increase functional independence with mobility by performin. Supine <> sit with Supervision.  2. Sit <> stand transfer with Supervision using No Assistive Device and Rolling Walker.  3. Bed <> chair transfer via Stand Pivot with Supervision using No Assistive Device and Rolling Walker.  4. Gait  x 150 feet with Stand-by Assistance using No Assistive Device and Rolling Walker to prepare for community ambulation and endurance activities.  5. Able to tolerate exercise for 15-20 reps with independence.       Outcome: Ongoing (interventions implemented as appropriate)  Pt's goals remain appropriate and pt will continue to benefit from skilled PT services to work towards improved functional mobility including: bed mobility, transfers, and gait.   Kaye Mairn, PT  2019

## 2019-09-19 NOTE — PLAN OF CARE
09/19/19 1528   Post-Acute Status   Post-Acute Authorization Placement   Post-Acute Placement Status Referrals Sent   JENY and CM met with pt at the bedside to discuss discharge planning. PT/OT recommending SNF. Pt stated she would only go to [facility]. If she is not able to go there, she would like to go home with home health with home wound vac. JENY brought list of SNFs to the bedside, but pt declined it.  JENY sent referral to [facility] via ATILIO Burgos LMSW  [facility]  [phone]

## 2019-09-19 NOTE — PLAN OF CARE
Problem: Occupational Therapy Goal  Goal: Occupational Therapy Goal  Goals to be met by: 10/1/19     Patient will increase functional independence with ADLs by performing:    UE Dressing with [place].  LE Dressing with Modified [place] and Assistive Devices as needed.  Grooming while standing at sink with Modified [place].  Toileting from toilet with Modified [place] for hygiene and clothing management.   Bathing from  edge of bed with Modified [place].  Toilet transfer to toilet with Modified [place].  Increased functional strength to WFL for B UE.  Upper extremity exercise program x15 reps per handout, with independence.     Outcome: Ongoing (interventions implemented as appropriate)  Continue with POC.  Adri Nielsen OT  9/19/2019

## 2019-09-19 NOTE — PT/OT/SLP PROGRESS
Occupational Therapy   Treatment    Name: Zaira Gupta  MRN: [id number]  Admitting Diagnosis:  Small bowel perforation  6 Days Post-Op   LAPAROTOMY, EXPLORATORY (N/A)    Recommendations:     Discharge Recommendations: nursing facility, skilled  Discharge Equipment Recommendations:  walker, rolling, commode  Barriers to discharge:  Decreased caregiver support    Assessment:     Zaira Gupta is a 58 y.o. female with a medical diagnosis of Small bowel perforation.  She presents with increased confusion this day. Patient completes functional transfer from bedside chair>bed with max assist x2. Patient agreeable to BUE exercises with HOB elevated. Discharge recommendation updated to SNF to maximize patient's independence with ADLs/IADLs and to decrease burden of care. Performance deficits affecting function are weakness, impaired endurance, impaired functional mobilty, impaired self care skills, impaired cognition, decreased safety awareness, impaired cardiopulmonary response to activity, gait instability, decreased lower extremity function.     Rehab Prognosis:  Good; patient would benefit from acute skilled OT services to address these deficits and reach maximum level of function.       Plan:     Patient to be seen 4 x/week to address the above listed problems via self-care/home management, therapeutic activities, therapeutic exercises  · Plan of Care Expires: 10/17/19  · Plan of Care Reviewed with: patient, family    Subjective     Pain/Comfort:  · Pain Rating 1: (Did not rate)    Objective:     Communicated with: RN prior to session.  Patient found up in chair with oxygen, wound vac, telemetry, PICC line, pulse ox (continuous) upon OT entry to room.    General Precautions: Standard, aspiration, fall   Orthopedic Precautions:N/A   Braces: N/A     Occupational Performance:     Bed Mobility:    · Patient completed Sit to Supine with maximal assist x2 for trunk and BLE placement onto bed     Functional  Mobility/Transfers:  · Patient completed Bedside Chair>Bed Transfer using Step Transfer technique with maximal assist x2 with hand-held assist  · Functional Mobility: Patient took ~6 steps from bedside chair>bed with max assist x2.    Activities of Daily Living:  · Grooming: set-up assistance Patient washed her face with a washcloth with HOB elevated with set-up assist.       Patient participated in BUE with HOB elevated with patient completing 1 x15 of the following with simulated dowel: shoulder flex/ext, chest presses, bicep curls      [facility] 6 Click ADL: 13    Treatment & Education:  Role of OT/POC  Call button for assistance    Patient left supine with all lines intact, call button in reach, RN notified and  presentEducation:      GOALS:   Multidisciplinary Problems     Occupational Therapy Goals        Problem: Occupational Therapy Goal    Goal Priority Disciplines Outcome Interventions   Occupational Therapy Goal     OT, PT/OT Ongoing (interventions implemented as appropriate)    Description:  Goals to be met by: 10/1/19     Patient will increase functional independence with ADLs by performing:    UE Dressing with [place].  LE Dressing with Modified [place] and Assistive Devices as needed.  Grooming while standing at sink with Modified [place].  Toileting from toilet with Modified [place] for hygiene and clothing management.   Bathing from  edge of bed with Modified [place].  Toilet transfer to toilet with Modified [place].  Increased functional strength to WFL for B UE.  Upper extremity exercise program x15 reps per handout, with independence.                      Time Tracking:     OT Date of Treatment: 09/19/19  OT Start Time: 1417  OT Stop Time: 1440  OT Total Time (min): 23 min    Billable Minutes:Therapeutic Activity 13 minutes  Therapeutic Exercise 10 minutes    Adri Nielsen OT  9/19/2019

## 2019-09-19 NOTE — PLAN OF CARE
[facility]    HOME HEALTH ORDERS  FACE TO FACE ENCOUNTER    Patient Name: Zaira Gupta  YOB: 1960    PCP: Minna Canchola NP   PCP Address: [address] / [place] [address]  PCP Phone Number: [phone]  PCP Fax: [phone]    Encounter Date: 09/19/2019    Admit to Home Health    Diagnoses:  Active Hospital Problems    Diagnosis  POA    *Small bowel perforation [K63.1]  Yes    Alteration in skin integrity [R23.9]  Yes    Acute confusional state [F05]  Yes    Gastric perforation [K25.5]  Yes    Hypothyroidism [E03.9]  Yes     On Synthroid        Resolved Hospital Problems    Diagnosis Date Resolved POA    Gastric perforation [K25.5] 09/14/2019 Yes       Future Appointments   Date Time Provider Department Center   12/19/2019 11:00 AM [facility] LAB [facility]A LAB [facility]   12/19/2019 11:15 AM [facility] SPECIMEN [facility] SPECLAB [facility]   12/23/2019  1:30 PM Kristi Bui MD [facility] NEPHRO [facility]   12/23/2019  2:20 PM Iman Katz NP [facility] ONCOL [facility] 4TH [place]   3/10/2020  8:15 AM [facility] LAB [facility] LAB [facility]   3/12/2020  9:00 AM Sherlyn Etienne NP [facility] ENDOCRN [facility]           I have seen and examined this patient face to face today. My clinical findings that support the need for the home health skilled services and home bound status are the following:  Medical restrictions requiring assistance of another human to leave home due to  Post surgery monitoring and Wound care needs.    Allergies:  Review of patient's allergies indicates:   Allergen Reactions    Aspirin (bulk) Other (See Comments)    Heparin analogues Other (See Comments)     Difficulty to arouse    Nsaids (non-steroidal anti-inflammatory drug)        Diet: regular diet    Activities: no heavy lifting, nothing heavier than 10lbs    Nursing:   SN to complete comprehensive assessment including routine vital signs. Instruct on disease process and s/s of complications to report to  MD. Review/verify medication list sent home with the patient at time of discharge  and instruct patient/caregiver as needed. Frequency may be adjusted depending on start of care date.    Notify MD if SBP > 160 or < 90; DBP > 90 or < 50; HR > 120 or < 50; Temp > 101      CONSULTS:    Physical Therapy to evaluate and treat. Evaluate for home safety and equipment needs; Establish/upgrade home exercise program. Perform / instruct on therapeutic exercises, gait training, transfer training, and Range of Motion.  Occupational Therapy to evaluate and treat. Evaluate home environment for safety and equipment needs. Perform/Instruct on transfers, ADL training, ROM, and therapeutic exercises.  Aide to provide assistance with personal care, ADLs, and vital signs.    MISCELLANEOUS CARE:  Routine Skin for Bedridden Patients: Instruct patient/caregiver to apply moisture barrier cream to all skin folds and wet areas in perineal area daily and after baths and all bowel movements.    WOUND CARE ORDERS  yes:  Wound Vac:   Location:  abdomen           Dressing changes every Monday, Wednesday and Friday.        ET Consult   Black sponge 100mmHg    Consult received on patient's left forearm. Left forearm purple discolored tissue with surrounding erythema and appears to be medical adhesive related skin injury. Purple discoloration 3cm x 3cm. Unknown etiology . Small amount of weeping noted.  Weeping noted as well over left hand with purple discolored tissue.     Recommendations:  Dress left forearm and left hand with foam dressing, change twice a week and as needed for saturation.  Left arm elevation with pillows.     Left forearm purple discolored tissue with surrounding erythema and appears to be medical adhesive related skin injury. Purple discoloration 3cm x 3cm.    Left hand ecchymosis/purple discoloration             Medications: Review discharge medications with patient and family and provide education.      Current Discharge  Medication List      CONTINUE these medications which have NOT CHANGED    Details   azaTHIOprine (IMURAN) 50 mg Tab TAKE 1 TABLET(50 MG) BY MOUTH EVERY DAY  Qty: 30 tablet, Refills: 0    Associated Diagnoses: Autoimmune hepatitis      CHOLECALCIFEROL, VITAMIN D3, (VITAMIN D3 ORAL) Take 1,000 mg by mouth once daily.      furosemide (LASIX) 40 MG tablet TAKE 1 TABLET(40 MG) BY MOUTH EVERY DAY  Qty: 30 tablet, Refills: 6    Associated Diagnoses: S/P TIPS (transjugular intrahepatic portosystemic shunt); Cirrhosis of liver without ascites, unspecified hepatic cirrhosis type      levothyroxine (SYNTHROID, LEVOTHROID) 175 MCG tablet Take 1 tablet (175 mcg total) by mouth before breakfast. Wait at least 1 hour after taking LT4 to take PPI  Qty: 90 tablet, Refills: 3    Associated Diagnoses: Hypothyroidism due to acquired atrophy of thyroid      pantoprazole (PROTONIX) 40 MG tablet TAKE 1 TABLET(40 MG) BY MOUTH EVERY DAY  Qty: 90 tablet, Refills: 0    Associated Diagnoses: Gastroesophageal reflux disease without esophagitis      spironolactone (ALDACTONE) 100 MG tablet TAKE 1 TABLET(100 MG) BY MOUTH EVERY DAY  Qty: 90 tablet, Refills: 0    Associated Diagnoses: Cirrhosis of liver without ascites, unspecified hepatic cirrhosis type      XIFAXAN 550 mg Tab TAKE 1 TABLET(550 MG) BY MOUTH TWICE DAILY  Qty: 60 tablet, Refills: 0    Associated Diagnoses: Hepatic encephalopathy             I certify that this patient is confined to her home and needs intermittent skilled nursing care, physical therapy and occupational therapy.

## 2019-09-19 NOTE — PLAN OF CARE
09/19/19 0828   Post-Acute Status   Post-Acute Authorization Home Health/Hospice   Home Health/Hospice Status Referrals Sent   Sw sent referral to [facility] via .  Sw will send referral to [facility] for wound vac once form is filled out. JENY in communication with JUAN Maharaj.      Dinah Burgos, ALESHIA  [facility]  [id number]

## 2019-09-20 NOTE — PROGRESS NOTES
[facility]  General Surgery  Progress Note    Subjective:     History of Present Illness:  No notes on file    Post-Op Info:  Procedure(s) (LRB):  LAPAROTOMY, EXPLORATORY (N/A)   7 Days Post-Op     Interval History:   Patient seen and examined, slightly more disoriented this am  Is able to answer orientation questions (oriented to person, place, time), but confused in conversation. Pushpa monitoring in place. Trending ammonia, patient previously refusing lactulose enemas.  Denies N/V  +F/4 BM  Wound vac put out 300mL overnight  Intermittent tachycardia, afebrile    Medications:  Continuous Infusions:   dextrose 5 % and 0.45 % NaCl       Scheduled Meds:   docusate sodium  100 mg Oral Daily    famotidine  20 mg Oral BID    lactulose  200 g Rectal TID    levothyroxine  175 mcg Oral Daily    polyethylene glycol  17 g Oral BID    rifAXIMin  550 mg Oral BID     PRN Meds:benzocaine, lidocaine, ondansetron, oxyCODONE, promethazine (PHENERGAN) IVPB, sodium chloride 0.9%     Review of patient's allergies indicates:   Allergen Reactions    Aspirin (bulk) Other (See Comments)    Heparin analogues Other (See Comments)     Difficulty to arouse    Nsaids (non-steroidal anti-inflammatory drug)      Objective:     Vital Signs (Most Recent):  Temp: 97.3 °F (36.3 °C) (09/20/19 0437)  Pulse: 92 (09/20/19 0437)  Resp: 18 (09/20/19 0437)  BP: (!) 119/54 (09/20/19 0437)  SpO2: (!) 92 % (09/20/19 0437) Vital Signs (24h Range):  Temp:  [96.4 °F (35.8 °C)-98.4 °F (36.9 °C)] 97.3 °F (36.3 °C)  Pulse:  [] 92  Resp:  [16-20] 18  SpO2:  [92 %-99 %] 92 %  BP: ()/(44-63) 119/54     Weight: (!) 137.8 kg (303 lb 12.7 oz)  Body mass index is 50.55 kg/m².    Intake/Output - Last 3 Shifts       09/18 0700 - 09/19 0659 09/19 0700 - 09/20 0659 09/20 0700 - 09/21 0659    P.O. 0 0     I.V. (mL/kg) 675 (4.9)      Total Intake(mL/kg) 675 (4.9) 0 (0)     Urine (mL/kg/hr) 1665 (0.5) 675 (0.2)     Other 90 300     Stool 0  0     Total Output 1755 975     Net -1080 -975            Urine Occurrence  2 x     Stool Occurrence 0 x 4 x           Physical Exam   Constitutional: She appears well-developed and well-nourished. No distress.   HENT:   Head: Normocephalic and atraumatic.   Cardiovascular: Normal rate and regular rhythm.   Pulmonary/Chest: Effort normal. No respiratory distress.   Abdominal:   Soft, obese  Appropriate TTP  Wound vac in place       Significant Labs:  CBC:   Recent Labs   Lab 09/19/19  0340   WBC 4.02   RBC 2.54*   HGB 9.2*   HCT 30.1*   PLT 40*   *   MCH 36.2*   MCHC 30.6*     CMP:   Recent Labs   Lab 09/19/19  0230   GLU 78   CALCIUM 9.0   ALBUMIN 1.8*   PROT 4.9*   *   K 5.0   CO2 23   *   BUN 44*   CREATININE 0.9   ALKPHOS 94   ALT 36   *   BILITOT 4.5*     Assessment/Plan:     * Small bowel perforation  58F s/p 9/13 Ex-Lap and small bowel resection.    - NPO with sips for meds (awaiting complete speech eval/barium swallow)  - mIVF  - PO prn pain/nausea meds (ok by speech)  - bowel regimen  - tid lactulose per rectum  - follow up am labs, including ammonia levels  - GI prophylaxis  - PT/OT rec, HH      Hypothyroidism  Levothyroxine         Danyelle Almazan MD  General Surgery  [facility]

## 2019-09-20 NOTE — PLAN OF CARE
Problem: Occupational Therapy Goal  Goal: Occupational Therapy Goal  Goals to be met by: 10/1/19     Patient will increase functional independence with ADLs by performing:    UE Dressing with [place].  LE Dressing with Modified [place] and Assistive Devices as needed.  Grooming while standing at sink with Modified [place].  Toileting from toilet with Modified [place] for hygiene and clothing management.   Bathing from  edge of bed with Modified [place].  Toilet transfer to toilet with Modified [place].  Increased functional strength to WFL for B UE.  Upper extremity exercise program x15 reps per handout, with independence.     Outcome: Ongoing (interventions implemented as appropriate)  Continue with POC.  Adri Nielsen OT  9/20/2019

## 2019-09-20 NOTE — ASSESSMENT & PLAN NOTE
58F s/p 9/13 Ex-Lap and small bowel resection.    - NPO with sips for meds (awaiting complete speech eval/barium swallow)  - mIVF  - PO prn pain/nausea meds (ok by speech)  - bowel regimen  - tid lactulose per rectum  - follow up am labs, including ammonia levels  - GI prophylaxis  - PT/OT rec, HH

## 2019-09-20 NOTE — PLAN OF CARE
Problem: Adult Inpatient Plan of Care  Goal: Plan of Care Review         Outcome: Ongoing (interventions implemented as appropriate)  Patient in a state of confusion throughout shift. Agitation noted this morning; with slurred speech patient reported clowns and elephants in the room but this morning patient was able to answer some questions appropriately. This afternoon patient arouses only briefly when being turned in bed and again only briefly during enema administration despite attempts to keep patient awake. Cannot answer questions appropriately.  Aspiration precautions in effect; HOB at 60-90 degrees. Suction at bedside. Safety precautions in place; Attendant camera in use, bed locked in lowest position, 3 side rails raised, Bed alarm on. Room clean and well organized. Hourly rounding done.  Delirium precautions in place; shades up, attempting to keep patient awake during the day. HAPI precautions in place; patient turned every 2 hours, waffle mattress applied to bed, green heel boots on, TEDs/SCDs in place. Enemas administered per MAR. Patient incontinent of bowel and bladder. Wound vac intact to midline incision. Weeping edema noted in both lower arms; arms loosely wrapped in Kerlix and elevated. Mouth care performed. Speech therapy, respiratory therapy, physical and occupational therapy following. WCTM.

## 2019-09-20 NOTE — PT/OT/SLP PROGRESS
"Physical Therapy Treatment    Patient Name:  Zaira Gupta   MRN:  [id number]    Recommendations:     Discharge Recommendations:  nursing facility, skilled(Simultaneous filing. User may not have seen previous data.)   Discharge Equipment Recommendations: commode, walker, rolling(Simultaneous filing. User may not have seen previous data.)   Barriers to discharge: Decreased caregiver support    Assessment:     Zaira Gupta is a 58 y.o. female admitted with a medical diagnosis of Small bowel perforation.  She presents with the following impairments/functional limitations:  weakness, impaired endurance, impaired functional mobilty, gait instability, decreased lower extremity function, pain, decreased safety awareness, impaired cognition, impaired cardiopulmonary response to activity, edema, impaired skin(Simultaneous filing. User may not have seen previous data.) . Pt limited with functional mobility due to confusion and difficulty following commands.     Rehab Prognosis: Fair; patient would benefit from acute skilled PT services to address these deficits and reach maximum level of function.    Recent Surgery: Procedure(s) (LRB):  LAPAROTOMY, EXPLORATORY (N/A) 7 Days Post-Op    Plan:     During this hospitalization, patient to be seen 4 x/week to address the identified rehab impairments via gait training, therapeutic activities, therapeutic exercises, neuromuscular re-education and progress toward the following goals:    · Plan of Care Expires:  10/16/19    Subjective   "I was talking to that man over there" (no man present)    Pain/Comfort:  · Pain Rating 1: (pt c/o B LE pain with LE elevated in chair, did not grade Simultaneous filing. User may not have seen previous data.)  · Location - Side 1: Bilateral  · Location 1: leg  · Pain Addressed 1: Reposition, Nurse notified  · Pain Rating Post-Intervention 1: 0/10      Objective:     Communicated with nurse prior to session.  Patient found supine with oxygen, wound " vac, telemetry, PICC line(Simultaneous filing. User may not have seen previous data.) upon PT entry to room.     General Precautions: Standard, aspiration, fall, NPO(Simultaneous filing. User may not have seen previous data.)   Orthopedic Precautions:N/A(Simultaneous filing. User may not have seen previous data.)   Braces: N/A(Simultaneous filing. User may not have seen previous data.)     Functional Mobility:  · Bed Mobility:     · Rolling Left:  total assistance  · Supine to Sit: total assistance and of 2 persons  · Sit to Supine: total assistance and of 2 persons  · Transfers:     · Sit to Stand:  maximal assistance with hand-held assist x 2 trials from bedside commode  · Bed to bedside commode, bedside commode to bedside chair; bedside Chair to bed: total assistance and of 2 persons with  hand-held assist  using  Stand Pivot    AM-PAC 6 CLICK MOBILITY  Turning over in bed (including adjusting bedclothes, sheets and blankets)?: 2  Sitting down on and standing up from a chair with arms (e.g., wheelchair, bedside commode, etc.): 2  Moving from lying on back to sitting on the side of the bed?: 2  Moving to and from a bed to a chair (including a wheelchair)?: 2  Need to walk in hospital room?: 2  Climbing 3-5 steps with a railing?: 1  Basic Mobility Total Score: 11     Therapeutic Activities and Exercises:   pt stood x 2 trials for ~ 30 sec with HHA with max assist to be cleaned after toiletting.    Patient left supine (up in bedside chair ~ 2 1/2 hours prior to return to bed as above) with all lines intact, call button in reach, nurse notified and AVS camera present..    GOALS:   Multidisciplinary Problems     Physical Therapy Goals        Problem: Physical Therapy Goal    Goal Priority Disciplines Outcome Goal Variances Interventions   Physical Therapy Goal     PT, PT/OT Ongoing (interventions implemented as appropriate)     Description:  Goals to be met by: 9/30/2019    Patient will increase functional  independence with mobility by performin. Supine <> sit with Supervision.  2. Sit <> stand transfer with Supervision using No Assistive Device and Rolling Walker.  3. Bed <> chair transfer via Stand Pivot with Supervision using No Assistive Device and Rolling Walker.  4. Gait  x 150 feet with Stand-by Assistance using No Assistive Device and Rolling Walker to prepare for community ambulation and endurance activities.  5. Able to tolerate exercise for 15-20 reps with independence.                        Time Tracking:     PT Received On: 19  PT Start Time: 1027     PT Stop Time: 1116  PT Total Time (min): 49 min (PT returned to transfer pt back to bed)    Billable Minutes: Therapeutic Activity 49    Treatment Type: Treatment  PT/PTA: PT           Kaye Marin, PT  2019

## 2019-09-20 NOTE — PT/OT/SLP PROGRESS
"Speech Language Pathology Treatment    Patient Name:  Zaira Gupta   MRN:  [id number]  Admitting Diagnosis: Small bowel perforation    Recommendations:                 General Recommendations:  Dysphagia therapy and ENT consult for continued throat pain  Diet recommendations:  NPO, Liquid Diet Level: NPO   Aspiration Precautions: Alternate means of nutrition/hydration/medication, Frequent oral care and Strict aspiration precautions   General Precautions: Standard, aspiration, fall, NPO  Communication strategies:  go to room if call light pushed    Subjective     Patient awake but lethargic throughout session  Patient with evident confusion during session  Communicated with nurse prior to session    Pain/Comfort:  · Pain Rating 1: 0/10  · Pain Rating Post-Intervention 1: 0/10    Objective:     Has the patient been evaluated by SLP for swallowing?   Yes  Keep patient NPO? Yes   Current Respiratory Status: nasal cannula      Patient seen for ongoing swallow assessment. She was sitting up in chair at bedside with RN intermittently present in room. Patient was originally scheduled for MBSS today, but RN was concerned about worsening respiratory status and confusion. SLP then assessed patient for appropriateness of MBSS. Patient exhibited wet congested breathing that was significantly worse than yesterday's session despite her being NPO since yesterday. She was unable to clear congestion during session with cued throat clears/coughs. During trials of ice chips x1, thin liquid x2 (via cup-edge), nectar-thick liquid x1 (via cup-edge), honey-thick liquid x1 (via tsp) and puree x1 (1/2 tsp pudding), patient demonstrated multiple uncoordinated swallows followed by significant coughing facial redness. This coughing was usually unproductive, but with puree trial she expectorated majority of bolus mixed with secretions. Following these coughing episodes, patient would repeatedly state, "That went down well," despite displaying " clear difficulties. Patient continued to report throat soreness which she attributed to a swollen thyroid that was present prior to admission. Given the increased level of dysphagia with minimal PO intake, SLP recommending deferring MBSS for the time being. Additionally, patient continued to exhibit a significant amount of dead skin on her tongue and in her oral cavity that SLP cleaned with a sponge and a toothbrush. SLP educated patient regarding current recs, but she demonstrated limited understanding. Patient left in bed with call light within reach. Recs communicated to treatment team.    Assessment:     Zaira Gupta is a 58 y.o. female with an SLP diagnosis of Dysphagia.      Goals:   Multidisciplinary Problems     SLP Goals        Problem: SLP Goal    Goal Priority Disciplines Outcome   SLP Goal     SLP    Description:  Speech-Language Pathology Goals  Goals to be met by 9/23/19  1. Patient will participate in ongoing swallow assessment in order to determine appropriateness of diet initiation.  2. Patient will participate in a modified barium swallow study in order to objectively assess swallow function.  3. Educate patient regarding risks/warning signs of aspiration.                      Plan:     · Patient to be seen:  5 x/week   · Plan of Care expires:  10/16/19  · Plan of Care reviewed with:  patient   · SLP Follow-Up:  Yes       Discharge recommendations:  nursing facility, skilled   Barriers to Discharge:  Level of Skilled Assistance Needed     Time Tracking:     SLP Treatment Date:   09/20/19  Speech Start Time:  1151  Speech Stop Time:  1211     Speech Total Time (min):  20 min    Billable Minutes: Treatment Swallowing Dysfunction 20    Clem Whyte CCC-SLP  Speech-Language Pathology  Pager: [phone]   09/20/2019

## 2019-09-20 NOTE — SUBJECTIVE & OBJECTIVE
Interval History:   Patient seen and examined, slightly more disoriented this am  Is able to answer orientation questions (oriented to person, place, time), but confused in conversation. Pushpa monitoring in place. Trending ammonia, patient previously refusing lactulose enemas.  Denies N/V  +F/4 BM  Wound vac put out 300mL overnight  Intermittent tachycardia, afebrile    Medications:  Continuous Infusions:   dextrose 5 % and 0.45 % NaCl       Scheduled Meds:   docusate sodium  100 mg Oral Daily    famotidine  20 mg Oral BID    lactulose  200 g Rectal TID    levothyroxine  175 mcg Oral Daily    polyethylene glycol  17 g Oral BID    rifAXIMin  550 mg Oral BID     PRN Meds:benzocaine, lidocaine, ondansetron, oxyCODONE, promethazine (PHENERGAN) IVPB, sodium chloride 0.9%     Review of patient's allergies indicates:   Allergen Reactions    Aspirin (bulk) Other (See Comments)    Heparin analogues Other (See Comments)     Difficulty to arouse    Nsaids (non-steroidal anti-inflammatory drug)      Objective:     Vital Signs (Most Recent):  Temp: 97.3 °F (36.3 °C) (09/20/19 0437)  Pulse: 92 (09/20/19 0437)  Resp: 18 (09/20/19 0437)  BP: (!) 119/54 (09/20/19 0437)  SpO2: (!) 92 % (09/20/19 0437) Vital Signs (24h Range):  Temp:  [96.4 °F (35.8 °C)-98.4 °F (36.9 °C)] 97.3 °F (36.3 °C)  Pulse:  [] 92  Resp:  [16-20] 18  SpO2:  [92 %-99 %] 92 %  BP: ()/(44-63) 119/54     Weight: (!) 137.8 kg (303 lb 12.7 oz)  Body mass index is 50.55 kg/m².    Intake/Output - Last 3 Shifts       09/18 0700 - 09/19 0659 09/19 0700 - 09/20 0659 09/20 0700 - 09/21 0659    P.O. 0 0     I.V. (mL/kg) 675 (4.9)      Total Intake(mL/kg) 675 (4.9) 0 (0)     Urine (mL/kg/hr) 1665 (0.5) 675 (0.2)     Other 90 300     Stool 0 0     Total Output 1755 975     Net -1080 -975            Urine Occurrence  2 x     Stool Occurrence 0 x 4 x           Physical Exam   Constitutional: She appears well-developed and well-nourished. No distress.    HENT:   Head: Normocephalic and atraumatic.   Cardiovascular: Normal rate and regular rhythm.   Pulmonary/Chest: Effort normal. No respiratory distress.   Abdominal:   Soft, obese  Appropriate TTP  Wound vac in place       Significant Labs:  CBC:   Recent Labs   Lab 09/19/19  0340   WBC 4.02   RBC 2.54*   HGB 9.2*   HCT 30.1*   PLT 40*   *   MCH 36.2*   MCHC 30.6*     CMP:   Recent Labs   Lab 09/19/19  0230   GLU 78   CALCIUM 9.0   ALBUMIN 1.8*   PROT 4.9*   *   K 5.0   CO2 23   *   BUN 44*   CREATININE 0.9   ALKPHOS 94   ALT 36   *   BILITOT 4.5*

## 2019-09-20 NOTE — PT/OT/SLP PROGRESS
"Occupational Therapy   Treatment    Name: Zaira Gupta  MRN: [id number]  Admitting Diagnosis:  Small bowel perforation  7 Days Post-Op   LAPAROTOMY, EXPLORATORY (N/A)    Recommendations:     Discharge Recommendations: nursing facility, skilled  Discharge Equipment Recommendations:  walker, rolling, commode  Barriers to discharge:  Decreased caregiver support    Assessment:     Zaira Gupta is a 58 y.o. female with a medical diagnosis of Small bowel perforation.  She presents with decreased mentation this day which limits participation with therapy. Performance deficits affecting function are weakness, impaired endurance, impaired functional mobilty, impaired self care skills, impaired cognition, decreased safety awareness, impaired cardiopulmonary response to activity, gait instability, decreased lower extremity function.     Rehab Prognosis:  Fair; patient would benefit from acute skilled OT services to address these deficits and reach maximum level of function.       Plan:     Patient to be seen 4 x/week to address the above listed problems via self-care/home management, therapeutic activities, therapeutic exercises  · Plan of Care Expires: 10/17/19  · Plan of Care Reviewed with: patient, family    Subjective     "I was talking to the man over there." (no man present)    Pain/Comfort:  Pain Rating 1: other (see comments)(Did not rate.)    Objective:     Communicated with: RN prior to session.  Patient found supine with oxygen, wound vac, telemetry, PICC line, pulse ox (continuous) upon OT entry to room.    General Precautions: Standard, aspiration, fall   Orthopedic Precautions:N/A   Braces:       Occupational Performance:     Bed Mobility:    · Patient completed Scooting to EOB for foot placement on floor with total assistance  · Patient completed Supine to Sit to L side EOB with total assistance for trunk and BLE advancement off bed  · Patient completed Sit to Supine with total assistance for trunk and BLE " placement onto bed    Functional Mobility/Transfers:  · Patient completed Sit>Stand Transfer with maximal assist x2  with  hand-held assist   · Patient completed Toilet Transfer onto BSC via Step Transfer technique with maximal assist x2 with  hand-held assist.   · Patient completed sit<>stand transfer x2 from/onto BSC with max assist x1 for jag-care.  · Patient completed BSC>bedside chair transfer via Step Transfer technique with max assist x2 with hand-held assist  · Functional Mobility: Patient took ~10 steps from bed>BSC>bedside chair>bed with max assist with hand-held assist. Patient required max verbal cueing throughout for instructions.  · Patient tolerated sitting UIC ~2.5 hours    Activities of Daily Living:  · Grooming: total assistance Patient brushed her hair while sitting up on BSC with total assist.  · Toileting: total assistance Patient completed toileting on the BSC with total assist for jag-care.       [facility] 6 Click ADL: 13    Treatment & Education:  Role of OT/POC  Call button for assistance    Patient left supine with all lines intact, call button in reach and RN notifiedEducation:      GOALS:   Multidisciplinary Problems     Occupational Therapy Goals        Problem: Occupational Therapy Goal    Goal Priority Disciplines Outcome Interventions   Occupational Therapy Goal     OT, PT/OT Ongoing (interventions implemented as appropriate)    Description:  Goals to be met by: 10/1/19     Patient will increase functional independence with ADLs by performing:    UE Dressing with Ceiba.  LE Dressing with Modified Ceiba and Assistive Devices as needed.  Grooming while standing at sink with Modified Ceiba.  Toileting from toilet with Modified Ceiba for hygiene and clothing management.   Bathing from  edge of bed with Modified Ceiba.  Toilet transfer to toilet with Modified Ceiba.  Increased functional strength to WFL for B UE.  Upper extremity exercise program x15  reps per handout, with independence.                      Time Tracking:     OT Date of Treatment: 09/20/19  OT Start Time: 1015  OT Stop Time: 1100  OT Total Time (min): 45 min (time added to transfer patient from bedside chair>bed)    Billable Minutes:Self Care/Home Management 30 minutes  Therapeutic Activity 15 minutes    Adri Nielsen OT  9/20/2019

## 2019-09-20 NOTE — PLAN OF CARE
Problem: SLP Goal  Goal: SLP Goal  Speech-Language Pathology Goals  Goals to be met by 9/23/19  1. Patient will participate in ongoing swallow assessment in order to determine appropriateness of diet initiation.  2. Patient will participate in a modified barium swallow study in order to objectively assess swallow function.  3. Educate patient regarding risks/warning signs of aspiration.     Patient seen for ongoing swallow assessment. Patient's swallowing function has worsened since yesterday and she was unable to tolerate any PO intake without significant coughing. She was originally scheduled for a modified barium swallow study today, but given decline in swallowing status patient is no longer appropriate for MBSS as she is at a high risk of aspiration with any PO intake. She continued to exhibit confusion and poor insight into swallowing difficulties. At this point, patient appears unsafe for any PO intake and SLP is recommending strict NPO with alternate means of hydration/nutrition/medication.     Clem Whyte CCC-SLP  Speech-Language Pathology  Pager: [phone]

## 2019-09-20 NOTE — PLAN OF CARE
09/20/19 1144   Post-Acute Status   Post-Acute Authorization Placement   Post-Acute Placement Status Pending Post-Acute Clinical Review   Pt remains under review for OSNF. JENY spoke with Zena who reported there may be a bed available Monday.JENY updated JUAN Maharaj.    Dinah Burgos, ALESHIA  [facility]  [id number]

## 2019-09-20 NOTE — PLAN OF CARE
Problem: Physical Therapy Goal  Goal: Physical Therapy Goal  Goals to be met by: 2019    Patient will increase functional independence with mobility by performin. Supine <> sit with Supervision.  2. Sit <> stand transfer with Supervision using No Assistive Device and Rolling Walker.  3. Bed <> chair transfer via Stand Pivot with Supervision using No Assistive Device and Rolling Walker.  4. Gait  x 150 feet with Stand-by Assistance using No Assistive Device and Rolling Walker to prepare for community ambulation and endurance activities.  5. Able to tolerate exercise for 15-20 reps with independence.       Outcome: Ongoing (interventions implemented as appropriate)  Pt's goals remain appropriate and pt will continue to benefit from skilled PT services to work towards improved functional mobility including: bed mobility, transfers, and gait.   Kaye Marin, PT  2019

## 2019-09-21 NOTE — SUBJECTIVE & OBJECTIVE
Review of Systems   Constitutional: Positive for activity change and appetite change. Negative for chills and fever.   HENT: Negative for sore throat and trouble swallowing.    Respiratory: Positive for shortness of breath. Negative for cough.    Cardiovascular: Negative for chest pain and leg swelling.   Gastrointestinal: Positive for constipation. Negative for abdominal distention, abdominal pain, diarrhea, nausea and vomiting.   Genitourinary: Negative for decreased urine volume and difficulty urinating.   Musculoskeletal: Negative for arthralgias and back pain.   Skin: Negative for color change and pallor.   Neurological: Positive for weakness. Negative for dizziness.   Psychiatric/Behavioral: Negative for agitation and confusion.       Past Medical History:   Diagnosis Date    Acid reflux     Anemia     Arthritis     Autoimmune hepatitis 10/23/2012      Ref. Range 2012 09:38  BRANT Latest Range: Neg <1:160  Pos, Titer to follow (A)  BRANT HEP-2 Titer Latest Range: Neg <1:160 titer Pos 1:320 (A)  BRANT Hep-2 Pattern No range found Homogeneous (A)  Anti-SSA Antibody Latest Range: <20 EU 1.68  Anti-SSA Interpretation Latest Range: Negative  Negative  Anti-SSB Antibody Latest Range: <20 EU 1.76  Anti-SSB Interpretation Latest Range: Negative  Negativ    Cirrhosis     Dry mouth     Esophageal varix bleeding     Hypothyroidism     Obesity     Thrombocytopenia        Past Surgical History:   Procedure Laterality Date    BLADDER SURGERY  in      SECTION, CLASSIC      DILATION AND CURETTAGE OF UTERUS  19932 MAB    EGD (ESOPHAGOGASTRODUODENOSCOPY) N/A 11/15/2013    Performed by Shaun Grossman MD at [facility] ENDO (2ND FLR)    ESOPHAGOGASTRODUODENOSCOPY (EGD) N/A 2015    Performed [facility] Luis [facility]ran-Reyes, [facility] at [facility]    LAPAROTOMY, EXPLORATORY N/A 2019    Performed by Zay Sánchez MD at [facility] OR 2ND FLR    TIPS PROCEDURE      TIPS revision  2012    TONSILLECTOMY,  ADENOIDECTOMY         Family history of liver disease: No    Review of patient's allergies indicates:   Allergen Reactions    Aspirin (bulk) Other (See Comments)    Heparin analogues Other (See Comments)     Difficulty to arouse    Nsaids (non-steroidal anti-inflammatory drug)        Tobacco Use    Smoking status: Former Smoker     Packs/day: 0.10     Types: Cigarettes    Smokeless tobacco: Never Used   Substance and Sexual Activity    Alcohol use: No     Alcohol/week: 0.0 oz    Drug use: No    Sexual activity: Never       Medications Prior to Admission   Medication Sig Dispense Refill Last Dose    azaTHIOprine (IMURAN) 50 mg Tab TAKE 1 TABLET(50 MG) BY MOUTH EVERY DAY 30 tablet 0 9/11/2019    CHOLECALCIFEROL, VITAMIN D3, (VITAMIN D3 ORAL) Take 1,000 mg by mouth once daily.   9/11/2019    furosemide (LASIX) 40 MG tablet TAKE 1 TABLET(40 MG) BY MOUTH EVERY DAY 30 tablet 6 9/11/2019    levothyroxine (SYNTHROID, LEVOTHROID) 175 MCG tablet Take 1 tablet (175 mcg total) by mouth before breakfast. Wait at least 1 hour after taking LT4 to take PPI 90 tablet 3 9/11/2019    pantoprazole (PROTONIX) 40 MG tablet TAKE 1 TABLET(40 MG) BY MOUTH EVERY DAY 90 tablet 0 9/11/2019    spironolactone (ALDACTONE) 100 MG tablet TAKE 1 TABLET(100 MG) BY MOUTH EVERY DAY 90 tablet 0 9/11/2019    XIFAXAN 550 mg Tab TAKE 1 TABLET(550 MG) BY MOUTH TWICE DAILY 60 tablet 0 9/11/2019       Objective:     Vital Signs (Most Recent):  Temp: 98 °F (36.7 °C) (09/21/19 1151)  Pulse: 101 (09/21/19 1531)  Resp: (!) 22 (09/21/19 1530)  BP: (!) 114/53 (09/21/19 1151)  SpO2: 98 % (09/21/19 1530) Vital Signs (24h Range):  Temp:  [97 °F (36.1 °C)-98.6 °F (37 °C)] 98 °F (36.7 °C)  Pulse:  [] 101  Resp:  [17-22] 22  SpO2:  [94 %-100 %] 98 %  BP: (114-137)/(51-61) 114/53     Weight: (!) 137.8 kg (303 lb 12.7 oz) (09/19/19 0401)  Body mass index is 50.55 kg/m².    Physical Exam   Constitutional: No distress.   HENT:   Mouth/Throat: Oropharynx  is clear and moist.   Eyes: Scleral icterus is present.   Cardiovascular: Normal rate and regular rhythm.   Pulmonary/Chest: Effort normal and breath sounds normal.   On nasal cannula   Abdominal: Soft. She exhibits no distension. There is no tenderness. There is no guarding.   Musculoskeletal: She exhibits no edema or deformity.   Neurological:   Slow to answer questions but oriented x3  +asterixis   Skin: Skin is warm and dry.   Psychiatric: She has a normal mood and affect. Her behavior is normal.   Vitals reviewed.      MELD-Na score: 24 at 9/21/2019  8:39 AM  MELD score: 24 at 9/21/2019  8:39 AM  Calculated from:  Serum Creatinine: 1.0 mg/dL at 9/21/2019  3:39 AM  Serum Sodium: 149 mmol/L (Rounded to 137 mmol/L) at 9/21/2019  3:39 AM  Total Bilirubin: 4.8 mg/dL at 9/21/2019  3:39 AM  INR(ratio): 2.9 at 9/21/2019  8:39 AM  Age: 58 years    Significant Labs:  CBC:   Recent Labs   Lab 09/21/19  0339   WBC 4.62   RBC 2.53*   HGB 9.2*   HCT 29.5*   PLT 27*     CMP:   Recent Labs   Lab 09/21/19  0339      CALCIUM 9.5   ALBUMIN 1.6*   PROT 4.7*   *   K 5.4*   CO2 23   *   BUN 48*   CREATININE 1.0   ALKPHOS 92   ALT 39   *   BILITOT 4.8*     Coagulation:   Recent Labs   Lab 09/16/19  1136 09/21/19  0839   INR 1.5* 2.9*   APTT 39.1*  --

## 2019-09-21 NOTE — ASSESSMENT & PLAN NOTE
58F s/p 9/13 Ex-Lap and small bowel resection.    - NPO  - mIVF  - PO prn pain/nausea meds (ok by speech)  - bowel regimen  - tid lactulose per rectum  - GI prophylaxis  - PT/OT rec, HH    - GI consulted for worsening liver dysfunction, appreciate the assistance

## 2019-09-21 NOTE — PROGRESS NOTES
[facility]  General Surgery  Progress Note    Subjective:     History of Present Illness:  No notes on file    Post-Op Info:  Procedure(s) (LRB):  LAPAROTOMY, EXPLORATORY (N/A)   8 Days Post-Op     Interval History: Patient continues to be confused, no able to swallow due to aspiration risk, wound vac in place, getting lactulose enemas but not able to hold them in for long at all    Medications:  Continuous Infusions:   dextrose 5 % 125 mL/hr at 09/21/19 0222     Scheduled Meds:   albuterol-ipratropium  3 mL Nebulization Q4H WAKE    famotidine (PF)  20 mg Intravenous BID    lactulose  200 g Rectal TID    levothyroxine  88 mcg Intravenous Daily     PRN Meds:benzocaine, Dextrose 10% Bolus, Dextrose 10% Bolus, glucagon (human recombinant), insulin aspart U-100, lidocaine, ondansetron, promethazine (PHENERGAN) IVPB, sodium chloride 0.9%     Review of patient's allergies indicates:   Allergen Reactions    Aspirin (bulk) Other (See Comments)    Heparin analogues Other (See Comments)     Difficulty to arouse    Nsaids (non-steroidal anti-inflammatory drug)      Objective:     Vital Signs (Most Recent):  Temp: 97.2 °F (36.2 °C) (09/21/19 0749)  Pulse: 90 (09/21/19 0849)  Resp: 20 (09/21/19 0849)  BP: 137/61 (09/21/19 0749)  SpO2: 98 % (09/21/19 0849) Vital Signs (24h Range):  Temp:  [97 °F (36.1 °C)-98.6 °F (37 °C)] 97.2 °F (36.2 °C)  Pulse:  [] 90  Resp:  [17-20] 20  SpO2:  [94 %-100 %] 98 %  BP: (107-137)/(43-61) 137/61     Weight: (!) 137.8 kg (303 lb 12.7 oz)  Body mass index is 50.55 kg/m².    Intake/Output - Last 3 Shifts       09/19 0700 - 09/20 0659 09/20 0700 - 09/21 0659 09/21 0700 - 09/22 0659    P.O. 0 0     I.V. (mL/kg)  2592.9 (18.8)     Other  0     Total Intake(mL/kg) 0 (0) 2592.9 (18.8)     Urine (mL/kg/hr) 675 (0.2) 900 (0.3)     Emesis/NG output  0     Other 300 250     Stool 0 0     Blood  0     Total Output 975 1150     Net -975 +1442.9            Urine Occurrence 2 x 5  x     Stool Occurrence 4 x 5 x     Emesis Occurrence  0 x           Physical Exam   Constitutional: She appears well-developed and well-nourished. No distress.   HENT:   Head: Normocephalic and atraumatic.   Cardiovascular: Normal rate and regular rhythm.   Pulmonary/Chest: Effort normal. No respiratory distress.   Abdominal:   Soft, obese  Appropriate TTP  Wound vac in place       Significant Labs:  CBC:   Recent Labs   Lab 09/21/19  0339   WBC 4.62   RBC 2.53*   HGB 9.2*   HCT 29.5*   PLT 27*   *   MCH 36.4*   MCHC 31.2*     CMP:   Recent Labs   Lab 09/21/19  0339      CALCIUM 9.5   ALBUMIN 1.6*   PROT 4.7*   *   K 5.4*   CO2 23   *   BUN 48*   CREATININE 1.0   ALKPHOS 92   ALT 39   *   BILITOT 4.8*       Significant Diagnostics:  I have reviewed all pertinent imaging results/findings within the past 24 hours.    Assessment/Plan:     * Small bowel perforation  58F s/p 9/13 Ex-Lap and small bowel resection.    - NPO  - mIVF  - PO prn pain/nausea meds (ok by speech)  - bowel regimen  - tid lactulose per rectum  - GI prophylaxis  - PT/OT rec, HH    - GI consulted for worsening liver dysfunction, appreciate the assistance        Hypothyroidism  Levothyroxine         Ronald Avila MD  General Surgery  [facility]

## 2019-09-21 NOTE — SUBJECTIVE & OBJECTIVE
Interval History: Patient continues to be confused, no able to swallow due to aspiration risk, wound vac in place, getting lactulose enemas but not able to hold them in for long at all    Medications:  Continuous Infusions:   dextrose 5 % 125 mL/hr at 09/21/19 0222     Scheduled Meds:   albuterol-ipratropium  3 mL Nebulization Q4H WAKE    famotidine (PF)  20 mg Intravenous BID    lactulose  200 g Rectal TID    levothyroxine  88 mcg Intravenous Daily     PRN Meds:benzocaine, Dextrose 10% Bolus, Dextrose 10% Bolus, glucagon (human recombinant), insulin aspart U-100, lidocaine, ondansetron, promethazine (PHENERGAN) IVPB, sodium chloride 0.9%     Review of patient's allergies indicates:   Allergen Reactions    Aspirin (bulk) Other (See Comments)    Heparin analogues Other (See Comments)     Difficulty to arouse    Nsaids (non-steroidal anti-inflammatory drug)      Objective:     Vital Signs (Most Recent):  Temp: 97.2 °F (36.2 °C) (09/21/19 0749)  Pulse: 90 (09/21/19 0849)  Resp: 20 (09/21/19 0849)  BP: 137/61 (09/21/19 0749)  SpO2: 98 % (09/21/19 0849) Vital Signs (24h Range):  Temp:  [97 °F (36.1 °C)-98.6 °F (37 °C)] 97.2 °F (36.2 °C)  Pulse:  [] 90  Resp:  [17-20] 20  SpO2:  [94 %-100 %] 98 %  BP: (107-137)/(43-61) 137/61     Weight: (!) 137.8 kg (303 lb 12.7 oz)  Body mass index is 50.55 kg/m².    Intake/Output - Last 3 Shifts       09/19 0700 - 09/20 0659 09/20 0700 - 09/21 0659 09/21 0700 - 09/22 0659    P.O. 0 0     I.V. (mL/kg)  2592.9 (18.8)     Other  0     Total Intake(mL/kg) 0 (0) 2592.9 (18.8)     Urine (mL/kg/hr) 675 (0.2) 900 (0.3)     Emesis/NG output  0     Other 300 250     Stool 0 0     Blood  0     Total Output 975 1150     Net -975 +1442.9            Urine Occurrence 2 x 5 x     Stool Occurrence 4 x 5 x     Emesis Occurrence  0 x           Physical Exam   Constitutional: She appears well-developed and well-nourished. No distress.   HENT:   Head: Normocephalic and atraumatic.    Cardiovascular: Normal rate and regular rhythm.   Pulmonary/Chest: Effort normal. No respiratory distress.   Abdominal:   Soft, obese  Appropriate TTP  Wound vac in place       Significant Labs:  CBC:   Recent Labs   Lab 09/21/19  0339   WBC 4.62   RBC 2.53*   HGB 9.2*   HCT 29.5*   PLT 27*   *   MCH 36.4*   MCHC 31.2*     CMP:   Recent Labs   Lab 09/21/19 0339      CALCIUM 9.5   ALBUMIN 1.6*   PROT 4.7*   *   K 5.4*   CO2 23   *   BUN 48*   CREATININE 1.0   ALKPHOS 92   ALT 39   *   BILITOT 4.8*       Significant Diagnostics:  I have reviewed all pertinent imaging results/findings within the past 24 hours.

## 2019-09-21 NOTE — ASSESSMENT & PLAN NOTE
59 yo F with hx of autoimmune hepatitis vs NORIEGA cirrhosis, hx of gastric variceal bleed s/p TIPS at [facility] in 2012, hypothyroidism, CKD, and morbid obesity who was [place] [facility] [place] found to have perforated viscous. She is s/p ex-lap with resection of 2cm perforation in small bowel. Patient with evidence of hepatic encephalopathy.    Recommendations:  - give PO lactulose titrate to 3-5 bowel movements daily  - cont rectal lactulose  - restart home Rifaximin 550mg PO BID  - daily CBC, CMP, INR

## 2019-09-21 NOTE — CONSULTS
[facility]  Hepatology  Consult Note    Patient Name: Zaira Gupta  MRN: [id number]  Admission Date: 9/13/2019  Hospital Length of Stay: 8 days  Attending Provider: Zya Sánchez MD   Primary Care Physician: Minna Canchola NP  Principal Problem:Small bowel perforation    Inpatient consult to Hepatology  Consult performed by: James Lorenzo MD  Consult ordered by: Ronald Avila MD        Subjective:     Transplant status: No    HPI:  This is a 59 yo F with hx of autoimmune hepatitis vs NORIEGA cirrhosis, hx of gastric variceal bleed s/p TIPS at [facility] in 2012, hypothyroidism, CKD, and morbid obesity who [facility] after found to have perforated viscous. Patient reported abdominal pain prior to admission which was intense and severe. She then reported nausea and vomiting and presented to [facility]. The decision was made on arrival to take to the OR for emergent ex lap. She was found have 2cm perforation in the small bowel. This area was resected and side to side anastomosis performed. Hepatology consulted for decompensated cirrhosis after surgery. She was last seen in clinic by Dr. Hankins two years ago. At that time she was taking Imuran. It is unclear if she has been following elsewhere and still taking the imuran. She currently reports feeling ok. She is NPO and has been receiving rectal lactulose.     Review of Systems   Constitutional: Positive for activity change and appetite change. Negative for chills and fever.   HENT: Negative for sore throat and trouble swallowing.    Respiratory: Positive for shortness of breath. Negative for cough.    Cardiovascular: Negative for chest pain and leg swelling.   Gastrointestinal: Positive for constipation. Negative for abdominal distention, abdominal pain, diarrhea, nausea and vomiting.   Genitourinary: Negative for decreased urine volume and difficulty urinating.   Musculoskeletal: Negative for arthralgias and back pain.   Skin: Negative  for color change and pallor.   Neurological: Positive for weakness. Negative for dizziness.   Psychiatric/Behavioral: Negative for agitation and confusion.       Past Medical History:   Diagnosis Date    Acid reflux     Anemia     Arthritis     Autoimmune hepatitis 10/23/2012      Ref. Range 2012 09:38  BRANT Latest Range: Neg <1:160  Pos, Titer to follow (A)  BRANT HEP-2 Titer Latest Range: Neg <1:160 titer Pos 1:320 (A)  BRANT Hep-2 Pattern No range found Homogeneous (A)  Anti-SSA Antibody Latest Range: <20 EU 1.68  Anti-SSA Interpretation Latest Range: Negative  Negative  Anti-SSB Antibody Latest Range: <20 EU 1.76  Anti-SSB Interpretation Latest Range: Negative  Negativ    Cirrhosis     Dry mouth     Esophageal varix bleeding     Hypothyroidism     Obesity     Thrombocytopenia        Past Surgical History:   Procedure Laterality Date    BLADDER SURGERY  in      SECTION, CLASSIC      DILATION AND CURETTAGE OF UTERUS   MAB    EGD (ESOPHAGOGASTRODUODENOSCOPY) N/A 11/15/2013    Performed by [facility]un [facility]man [facility] at [facility] (2ND FLR)    ESOPHAGOGASTRODUODENOSCOPY (EGD) N/A 2015    Performed by Luis [facility]ran-Reyes, [facility] at [facility]    LAPAROTOMY, EXPLORATORY N/A 2019    Performed by Zay Sánchez MD at [facility] OR 2ND FLR    TIPS PROCEDURE      TIPS revision  2012    TONSILLECTOMY, ADENOIDECTOMY         Family history of liver disease: No    Review of patient's allergies indicates:   Allergen Reactions    Aspirin (bulk) Other (See Comments)    Heparin analogues Other (See Comments)     Difficulty to arouse    Nsaids (non-steroidal anti-inflammatory drug)        Tobacco Use    Smoking status: Former Smoker     Packs/day: 0.10     Types: Cigarettes    Smokeless tobacco: Never Used   Substance and Sexual Activity    Alcohol use: No     Alcohol/week: 0.0 oz    Drug use: No    Sexual activity: Never       Medications Prior to Admission   Medication Sig  Dispense Refill Last Dose    azaTHIOprine (IMURAN) 50 mg Tab TAKE 1 TABLET(50 MG) BY MOUTH EVERY DAY 30 tablet 0 9/11/2019    CHOLECALCIFEROL, VITAMIN D3, (VITAMIN D3 ORAL) Take 1,000 mg by mouth once daily.   9/11/2019    furosemide (LASIX) 40 MG tablet TAKE 1 TABLET(40 MG) BY MOUTH EVERY DAY 30 tablet 6 9/11/2019    levothyroxine (SYNTHROID, LEVOTHROID) 175 MCG tablet Take 1 tablet (175 mcg total) by mouth before breakfast. Wait at least 1 hour after taking LT4 to take PPI 90 tablet 3 9/11/2019    pantoprazole (PROTONIX) 40 MG tablet TAKE 1 TABLET(40 MG) BY MOUTH EVERY DAY 90 tablet 0 9/11/2019    spironolactone (ALDACTONE) 100 MG tablet TAKE 1 TABLET(100 MG) BY MOUTH EVERY DAY 90 tablet 0 9/11/2019    XIFAXAN 550 mg Tab TAKE 1 TABLET(550 MG) BY MOUTH TWICE DAILY 60 tablet 0 9/11/2019       Objective:     Vital Signs (Most Recent):  Temp: 98 °F (36.7 °C) (09/21/19 1151)  Pulse: 101 (09/21/19 1531)  Resp: (!) 22 (09/21/19 1530)  BP: (!) 114/53 (09/21/19 1151)  SpO2: 98 % (09/21/19 1530) Vital Signs (24h Range):  Temp:  [97 °F (36.1 °C)-98.6 °F (37 °C)] 98 °F (36.7 °C)  Pulse:  [] 101  Resp:  [17-22] 22  SpO2:  [94 %-100 %] 98 %  BP: (114-137)/(51-61) 114/53     Weight: (!) 137.8 kg (303 lb 12.7 oz) (09/19/19 0401)  Body mass index is 50.55 kg/m².    Physical Exam   Constitutional: No distress.   HENT:   Mouth/Throat: Oropharynx is clear and moist.   Eyes: Scleral icterus is present.   Cardiovascular: Normal rate and regular rhythm.   Pulmonary/Chest: Effort normal and breath sounds normal.   On nasal cannula   Abdominal: Soft. She exhibits no distension. There is no tenderness. There is no guarding.   Musculoskeletal: She exhibits no edema or deformity.   Neurological:   Slow to answer questions but oriented x3  +asterixis   Skin: Skin is warm and dry.   Psychiatric: She has a normal mood and affect. Her behavior is normal.   Vitals reviewed.      MELD-Na score: 24 at 9/21/2019  8:39 AM  MELD score: 24  at 9/21/2019  8:39 AM  Calculated from:  Serum Creatinine: 1.0 mg/dL at 9/21/2019  3:39 AM  Serum Sodium: 149 mmol/L (Rounded to 137 mmol/L) at 9/21/2019  3:39 AM  Total Bilirubin: 4.8 mg/dL at 9/21/2019  3:39 AM  INR(ratio): 2.9 at 9/21/2019  8:39 AM  Age: 58 years    Significant Labs:  CBC:   Recent Labs   Lab 09/21/19  0339   WBC 4.62   RBC 2.53*   HGB 9.2*   HCT 29.5*   PLT 27*     CMP:   Recent Labs   Lab 09/21/19  0339      CALCIUM 9.5   ALBUMIN 1.6*   PROT 4.7*   *   K 5.4*   CO2 23   *   BUN 48*   CREATININE 1.0   ALKPHOS 92   ALT 39   *   BILITOT 4.8*     Coagulation:   Recent Labs   Lab 09/16/19  1136 09/21/19  0839   INR 1.5* 2.9*   APTT 39.1*  --            Assessment/Plan:     Cirrhosis  59 yo F with hx of autoimmune hepatitis vs NORIEGA cirrhosis, hx of gastric variceal bleed s/p TIPS at [facility] in 2012, hypothyroidism, CKD, and morbid obesity who was transferred to [facility] after found to have perforated viscous. She is s/p ex-lap with resection of 2cm perforation in small bowel. Patient with evidence of hepatic encephalopathy.    Recommendations:  - give PO lactulose titrate to 3-5 bowel movements daily  - cont rectal lactulose  - restart home Rifaximin 550mg PO BID  - daily CBC, CMP, INR        Thank you for your consult. I will follow-up with patient. Please contact us if you have any additional questions.    James Lorenzo MD  Hepatology  [facility]

## 2019-09-21 NOTE — PLAN OF CARE
Problem: Adult Inpatient Plan of Care  Goal: Plan of Care Review         Outcome: Ongoing (interventions implemented as appropriate)  Patient in a state of confusion throughout shift. Arouses to voice briefly when being turned in bed and again only briefly during enema administration despite attempts to keep patient awake. Can answer questions appropriately sporadically but wholly confused.  Aspiration precautions in effect; HOB at 60-90 degrees. Suction at bedside. Safety precautions in place; Attendant camera in use, bed locked in lowest position, 3 side rails raised, Bed alarm on. Room clean and well organized. Hourly rounding done.  Delirium precautions in place; shades up, attempting to keep patient awake during the day. HAPI precautions in place; patient turned every 2 hours, waffle mattress applied to bed, green heel boots on, TEDs/SCDs in place. Enemas administered per MAR. Patient incontinent of bowel and bladder. Wound vac intact to midline incision. Weeping edema noted in both lower arms; arms loosely wrapped in Kerlix and elevated. Mouth care performed. Speech therapy, respiratory therapy, physical and occupational therapy following. WCTM.

## 2019-09-21 NOTE — HPI
This is a 59 yo F with hx of autoimmune hepatitis vs NORIEGA cirrhosis, hx of gastric variceal bleed s/p TIPS at [facility] in 2012, hypothyroidism, CKD, and morbid obesity who was [place] [facility] after found to have perforated viscous. Patient reported abdominal pain prior to admission which was intense and severe. She then reported nausea and vomiting and presented to [facility]. The decision was made on arrival to take to the OR for emergent ex lap. She was found have 2cm perforation in the small bowel. This area was resected and side to side anastomosis performed. Hepatology consulted for decompensated cirrhosis after surgery. She was last seen in clinic by Dr. Hankins two years ago. At that time she was taking Imuran. It is unclear if she has been following elsewhere and still taking the imuran. She currently reports feeling ok. She is NPO and has been receiving rectal lactulose.

## 2019-09-22 PROBLEM — R13.10 DYSPHAGIA: Status: ACTIVE | Noted: 2019-01-01

## 2019-09-22 PROBLEM — R04.0 EPISTAXIS: Status: ACTIVE | Noted: 2019-01-01

## 2019-09-22 NOTE — CONSULTS
[facility]  Otorhinolaryngology-Head & Neck Surgery  Consult Note    Patient Name: Zaira Gupta  MRN: [id number]  Code Status: Full Code  Admission Date: 9/13/2019  Hospital Length of Stay: 9 days  Attending Physician: Zay Sánchez MD  Primary Care Provider: Minna Canchola NP    Patient information was obtained from patient and past medical records.     Inpatient consult to ENT  Consult performed by: Mateo Hernandez MD  Consult ordered by: Ronald Avila MD        Subjective:     Chief Complaint/Reason for Admission: dysphagia    History of Present Illness: 57 y/o F with Hx of AIH, cirrhosis with splenomegaly and low-volume ascites, portal HTN s/p TIPS (2012), reflux esophagitis (on protonix), hypothyroidism, CKD III, and morbid obesity who presented to ED w/ abdominal pain and subsequently was found to have gastric perforation on ex lap. After surgery she was noted to have some dysphagia and thick sputum. ENT consulted for evaluation of dysphagia.     Patient states she does not notice any aspiration and believes she can swallow okay. She c/o dry mouth and nose and productive cough of thick sputum. Her voice is normal and denies any globus, odynophagia, or dysphagia. Patient was intubated only for the procedure and has had no h/o chest or neck surgery. Denies stridor.    Medications:  Continuous Infusions:   dextrose 5 % 125 mL/hr at 09/22/19 1003     Scheduled Meds:   albuterol-ipratropium  3 mL Nebulization Q4H WAKE    famotidine (PF)  20 mg Intravenous BID    lactulose  200 g Rectal TID    levothyroxine  88 mcg Intravenous Daily     PRN Meds:benzocaine, Dextrose 10% Bolus, Dextrose 10% Bolus, glucagon (human recombinant), insulin aspart U-100, lidocaine, ondansetron, promethazine (PHENERGAN) IVPB, sodium chloride 0.9%     No current facility-administered medications on file prior to encounter.      Current Outpatient Medications on File Prior to Encounter   Medication  Sig    azaTHIOprine (IMURAN) 50 mg Tab TAKE 1 TABLET(50 MG) BY MOUTH EVERY DAY    CHOLECALCIFEROL, VITAMIN D3, (VITAMIN D3 ORAL) Take 1,000 mg by mouth once daily.    furosemide (LASIX) 40 MG tablet TAKE 1 TABLET(40 MG) BY MOUTH EVERY DAY    levothyroxine (SYNTHROID, LEVOTHROID) 175 MCG tablet Take 1 tablet (175 mcg total) by mouth before breakfast. Wait at least 1 hour after taking LT4 to take PPI    pantoprazole (PROTONIX) 40 MG tablet TAKE 1 TABLET(40 MG) BY MOUTH EVERY DAY    spironolactone (ALDACTONE) 100 MG tablet TAKE 1 TABLET(100 MG) BY MOUTH EVERY DAY    XIFAXAN 550 mg Tab TAKE 1 TABLET(550 MG) BY MOUTH TWICE DAILY       Review of patient's allergies indicates:   Allergen Reactions    Aspirin (bulk) Other (See Comments)    Heparin analogues Other (See Comments)     Difficulty to arouse    Nsaids (non-steroidal anti-inflammatory drug)        Past Medical History:   Diagnosis Date    Acid reflux     Anemia     Arthritis     Autoimmune hepatitis 10/23/2012      Ref. Range 2012 09:38  BRANT Latest Range: Neg <1:160  Pos, Titer to follow (A)  BRANT HEP-2 Titer Latest Range: Neg <1:160 titer Pos 1:320 (A)  BRANT Hep-2 Pattern No range found Homogeneous (A)  Anti-SSA Antibody Latest Range: <20 EU 1.68  Anti-SSA Interpretation Latest Range: Negative  Negative  Anti-SSB Antibody Latest Range: <20 EU 1.76  Anti-SSB Interpretation Latest Range: Negative  Negativ    Cirrhosis     Dry mouth     Esophageal varix bleeding     Hypothyroidism     Obesity     Thrombocytopenia      Past Surgical History:   Procedure Laterality Date    BLADDER SURGERY  in      SECTION, CLASSIC      DILATION AND CURETTAGE OF UTERUS  19932 MAB    EGD (ESOPHAGOGASTRODUODENOSCOPY) N/A 11/15/2013    Performed by [facility]un [facility]man [facility] at [facility] (2ND FLR)    ESOPHAGOGASTRODUODENOSCOPY (EGD) N/A 2015    Performed by Luis [facility]ran[facility]Reyes[facility] at [facility]    LAPAROTOMY, EXPLORATORY N/A 2019     Performed by Zay Sánchez MD at [facility] OR [facility] F[facility]    TIPS PROCEDURE      TIPS revision  7/5/2012    TONSILLECTOMY, ADENOIDECTOMY       Family History     Problem Relation (Age of Onset)    Arthritis Mother    Diabetes Mother, Sister, Sister    Hypertension Sister        Tobacco Use    Smoking status: Former Smoker     Packs/day: 0.10     Types: Cigarettes    Smokeless tobacco: Never Used   Substance and Sexual Activity    Alcohol use: No     Alcohol/week: 0.0 standard drinks    Drug use: No    Sexual activity: Never     Review of Systems   Constitutional: Negative for chills and fever.   HENT: Positive for nosebleeds. Negative for drooling, facial swelling, sinus pressure, sinus pain, sore throat, trouble swallowing and voice change.    Eyes: Negative for pain and visual disturbance.   Respiratory: Negative for shortness of breath and stridor.    Gastrointestinal: Negative for nausea and vomiting.   Musculoskeletal: Negative for neck pain and neck stiffness.   Neurological: Negative for facial asymmetry, weakness and headaches.     Objective:     Vital Signs (Most Recent):  Temp: 96.5 °F (35.8 °C) (09/22/19 0820)  Pulse: 95 (09/22/19 1120)  Resp: 18 (09/22/19 1113)  BP: (!) 113/53 (09/22/19 0820)  SpO2: 99 % (09/22/19 1113) Vital Signs (24h Range):  Temp:  [96.5 °F (35.8 °C)-98.2 °F (36.8 °C)] 96.5 °F (35.8 °C)  Pulse:  [] 95  Resp:  [16-22] 18  SpO2:  [93 %-100 %] 99 %  BP: (113-129)/(53-60) 113/53     Weight: (!) 137.8 kg (303 lb 12.7 oz)  Body mass index is 50.55 kg/m².        Physical Exam  NAD  Awake and alert  Head AT/NC  Auricles WNL AU  Nose w/ normal external appearance; dried blood in right nostril  Dry mucous membranes w/ thick mucous, anterior tongue mobile, FOM soft, OP patent w/ midline uvula  Neck soft, not TTP, normal ROM, no LAD  Normal WOB, no stridor or stertor    Flexible Fiberoptic Laryngoscopy   Nasal cavity/nasopharynx: dry nasal mucosa w/ mucosal bleeding and crusting. Normal  inferior turbinates, and septum. No evidence of septal deviation or perforation. There are no visible lesions. Bilateral michelle WNL  Oropharynx: pharyngeal wall without edema, lesions, or masses. Bilateral pharyngeal tonsils WNL. BOT symmetric without masses or lingual tonsillar hypertrophy.   Hypopharynx: There are no lesions of the piriform sinuses or postcricoid area. There is not pooling of secretions.  Supraglottis: there is slight edema of the arytenoids and interarytenoid space. Epiglottis is crisp. There are no masses noted. False vocal folds without lesions.  Glottis: TVF without lesions and with normal mobility and airway patency  Subglottis: no stenosis or masses          Significant Labs:  CBC:   Recent Labs   Lab 09/22/19 0817   WBC 4.88   RBC 2.04*   HGB 7.3*   HCT 23.5*   PLT 41*   *   MCH 35.8*   MCHC 31.1*     CMP:   Recent Labs   Lab 09/22/19 0417      CALCIUM 8.9   ALBUMIN 1.5*   PROT 4.6*   *   K 4.5   CO2 24   *   BUN 42*   CREATININE 1.0   ALKPHOS 91   ALT 35   AST 95*   BILITOT 4.8*     Coagulation:   Recent Labs   Lab 09/22/19 0817   LABPROT 25.0*   INR 2.6*   APTT 53.6*       Significant Diagnostics:  None    Assessment/Plan:     Dysphagia  FFL is normal aside from mild arytenoid edema. Edema is likely 2/2 reflux disease or from intubation and should resolve. No other anatomic abnormalities.  - diet per SLP  - recommend MBSS if appropriate  - antireflux therapy  - oral care   - please page with questions    Epistaxis  Likely from desiccation  - nasal saline q4h b/l nostrils  - afrin PRN  - patient needs HUMIDIFIED nasal cannula          VTE Risk Mitigation (From admission, onward)         Ordered     IP VTE HIGH RISK PATIENT  Once      09/13/19 0737     Place sequential compression device  Until discontinued      09/13/19 0737     Place ZONIA hose  Until discontinued      09/13/19 0412                Thank you for your consult. I will sign off. Please contact us if  you have any additional questions.    Mateo Hernandez MD  Otorhinolaryngology-Head & Neck Surgery  [facility]

## 2019-09-22 NOTE — ASSESSMENT & PLAN NOTE
58F s/p 9/13 Ex-Lap and small bowel resection.    - NPO per speech recs, ENT consulted due to new aspiration issues  - mIVF  - PO prn pain/nausea meds (ok by speech)  - bowel regimen  - tid lactulose per rectum  - GI prophylaxis  - PT/OT rec, HH    - GI consulted for worsening liver dysfunction, appreciate the assistance

## 2019-09-22 NOTE — TREATMENT PLAN
Hepatology Treatment Plan    Patient not tolerating PO due to aspiration risk. There are no IV medications to treat hepatic encephalopathy as the mechanism of action needs to work directly in the gut by PO.    Recommendations:  - can place NG tube and administer medications. Esophageal varices is not a contraindication.  - rifaximin 550mg PO BID  - lactulose PO titrate to 3-5 bowel movements daily  - daily CBC, CMP, INR    Case discussed with staff, Dr. Burgos.    James Lorenzo MD PGY-VI  Gastroenterology Fellow  [facility]

## 2019-09-22 NOTE — PLAN OF CARE
Pt AAOX4 throughout night. Multiple complaints of pain overnight, MD notified. One time dose of 0.2 dilaudid given. NPO, no complaints of nausea or vomiting. Adequate urine output overnight, BM X3. VS stable on 2L NC. Pt slept between care. Pt turned Q2 with wedge and moon boots. Bed low and locked, call bell within reach. Will continue to monitor.

## 2019-09-22 NOTE — ASSESSMENT & PLAN NOTE
FFL is normal aside from mild arytenoid edema. Edema is likely 2/2 reflux disease or from intubation and should resolve. No other anatomic abnormalities.  - diet per SLP  - recommend MBSS if appropriate  - antireflux therapy  - oral care   - please page with questions

## 2019-09-22 NOTE — PROGRESS NOTES
[facility]  General Surgery  Progress Note    Subjective:     History of Present Illness:  No notes on file    Post-Op Info:  Procedure(s) (LRB):  LAPAROTOMY, EXPLORATORY (N/A)   9 Days Post-Op     Interval History: NAEON, is more alert this morning, afebrile, pain well controlled, HDS    Medications:  Continuous Infusions:   dextrose 5 % 125 mL/hr at 09/21/19 1036     Scheduled Meds:   albuterol-ipratropium  3 mL Nebulization Q4H WAKE    famotidine (PF)  20 mg Intravenous BID    lactulose  200 g Rectal TID    levothyroxine  88 mcg Intravenous Daily     PRN Meds:benzocaine, Dextrose 10% Bolus, Dextrose 10% Bolus, glucagon (human recombinant), insulin aspart U-100, lidocaine, ondansetron, promethazine (PHENERGAN) IVPB, sodium chloride 0.9%     Review of patient's allergies indicates:   Allergen Reactions    Aspirin (bulk) Other (See Comments)    Heparin analogues Other (See Comments)     Difficulty to arouse    Nsaids (non-steroidal anti-inflammatory drug)      Objective:     Vital Signs (Most Recent):  Temp: 97.6 °F (36.4 °C) (09/22/19 0438)  Pulse: 102 (09/22/19 0736)  Resp: 18 (09/22/19 0736)  BP: (!) 129/59 (09/22/19 0438)  SpO2: 97 % (09/22/19 0736) Vital Signs (24h Range):  Temp:  [97.2 °F (36.2 °C)-98.2 °F (36.8 °C)] 97.6 °F (36.4 °C)  Pulse:  [] 102  Resp:  [16-22] 18  SpO2:  [93 %-99 %] 97 %  BP: (114-137)/(53-61) 129/59     Weight: (!) 137.8 kg (303 lb 12.7 oz)  Body mass index is 50.55 kg/m².    Intake/Output - Last 3 Shifts       09/20 0700 - 09/21 0659 09/21 0700 - 09/22 0659 09/22 0700 - 09/23 0659    P.O. 0 0     I.V. (mL/kg) 2592.9 (18.8) 3081.3 (22.4)     Other 0 0     Total Intake(mL/kg) 2592.9 (18.8) 3081.3 (22.4)     Urine (mL/kg/hr) 900 (0.3) 0 (0)     Emesis/NG output 0 0     Other 250 0     Stool 0 0     Blood 0 0     Total Output 1150 0     Net +1442.9 +3081.3            Urine Occurrence 5 x 7 x     Stool Occurrence 5 x 6 x     Emesis Occurrence 0 x 0 x            Physical Exam   Constitutional: She appears well-developed and well-nourished. No distress.   HENT:   Head: Normocephalic and atraumatic.   Cardiovascular: Normal rate and regular rhythm.   Pulmonary/Chest: Effort normal. No respiratory distress.   Abdominal:   Soft, obese  Appropriate TTP  Wound vac in place       Significant Labs:  CBC:   Recent Labs   Lab 09/21/19  0339   WBC 4.62   RBC 2.53*   HGB 9.2*   HCT 29.5*   PLT 27*   *   MCH 36.4*   MCHC 31.2*     CMP:   Recent Labs   Lab 09/22/19  0417      CALCIUM 8.9   ALBUMIN 1.5*   PROT 4.6*   *   K 4.5   CO2 24   *   BUN 42*   CREATININE 1.0   ALKPHOS 91   ALT 35   AST 95*   BILITOT 4.8*     Coagulation:   Recent Labs   Lab 09/16/19  1136 09/21/19  0839   LABPROT 15.2* 27.7*   INR 1.5* 2.9*   APTT 39.1*  --        Significant Diagnostics:  I have reviewed all pertinent imaging results/findings within the past 24 hours.    Assessment/Plan:     * Small bowel perforation  58F s/p 9/13 Ex-Lap and small bowel resection.    - NPO per speech recs, ENT consulted due to new aspiration issues  - mIVF  - PO prn pain/nausea meds (ok by speech)  - bowel regimen  - tid lactulose per rectum  - GI prophylaxis  - PT/OT rec, HH    - GI consulted for worsening liver dysfunction, appreciate the assistance    Hypothyroidism  Levothyroxine         Ronald Avila MD  General Surgery  [facility]

## 2019-09-22 NOTE — ASSESSMENT & PLAN NOTE
Likely from desiccation  - nasal saline q4h b/l nostrils  - afrin PRN  - patient needs HUMIDIFIED nasal cannula

## 2019-09-22 NOTE — SUBJECTIVE & OBJECTIVE
Interval History: NAEON, is more alert this morning, afebrile, pain well controlled, HDS    Medications:  Continuous Infusions:   dextrose 5 % 125 mL/hr at 09/21/19 1036     Scheduled Meds:   albuterol-ipratropium  3 mL Nebulization Q4H WAKE    famotidine (PF)  20 mg Intravenous BID    lactulose  200 g Rectal TID    levothyroxine  88 mcg Intravenous Daily     PRN Meds:benzocaine, Dextrose 10% Bolus, Dextrose 10% Bolus, glucagon (human recombinant), insulin aspart U-100, lidocaine, ondansetron, promethazine (PHENERGAN) IVPB, sodium chloride 0.9%     Review of patient's allergies indicates:   Allergen Reactions    Aspirin (bulk) Other (See Comments)    Heparin analogues Other (See Comments)     Difficulty to arouse    Nsaids (non-steroidal anti-inflammatory drug)      Objective:     Vital Signs (Most Recent):  Temp: 97.6 °F (36.4 °C) (09/22/19 0438)  Pulse: 102 (09/22/19 0736)  Resp: 18 (09/22/19 0736)  BP: (!) 129/59 (09/22/19 0438)  SpO2: 97 % (09/22/19 0736) Vital Signs (24h Range):  Temp:  [97.2 °F (36.2 °C)-98.2 °F (36.8 °C)] 97.6 °F (36.4 °C)  Pulse:  [] 102  Resp:  [16-22] 18  SpO2:  [93 %-99 %] 97 %  BP: (114-137)/(53-61) 129/59     Weight: (!) 137.8 kg (303 lb 12.7 oz)  Body mass index is 50.55 kg/m².    Intake/Output - Last 3 Shifts       09/20 0700 - 09/21 0659 09/21 0700 - 09/22 0659 09/22 0700 - 09/23 0659    P.O. 0 0     I.V. (mL/kg) 2592.9 (18.8) 3081.3 (22.4)     Other 0 0     Total Intake(mL/kg) 2592.9 (18.8) 3081.3 (22.4)     Urine (mL/kg/hr) 900 (0.3) 0 (0)     Emesis/NG output 0 0     Other 250 0     Stool 0 0     Blood 0 0     Total Output 1150 0     Net +1442.9 +3081.3            Urine Occurrence 5 x 7 x     Stool Occurrence 5 x 6 x     Emesis Occurrence 0 x 0 x           Physical Exam   Constitutional: She appears well-developed and well-nourished. No distress.   HENT:   Head: Normocephalic and atraumatic.   Cardiovascular: Normal rate and regular rhythm.   Pulmonary/Chest: Effort  normal. No respiratory distress.   Abdominal:   Soft, obese  Appropriate TTP  Wound vac in place       Significant Labs:  CBC:   Recent Labs   Lab 09/21/19  0339   WBC 4.62   RBC 2.53*   HGB 9.2*   HCT 29.5*   PLT 27*   *   MCH 36.4*   MCHC 31.2*     CMP:   Recent Labs   Lab 09/22/19  0417      CALCIUM 8.9   ALBUMIN 1.5*   PROT 4.6*   *   K 4.5   CO2 24   *   BUN 42*   CREATININE 1.0   ALKPHOS 91   ALT 35   AST 95*   BILITOT 4.8*     Coagulation:   Recent Labs   Lab 09/16/19  1136 09/21/19  0839   LABPROT 15.2* 27.7*   INR 1.5* 2.9*   APTT 39.1*  --        Significant Diagnostics:  I have reviewed all pertinent imaging results/findings within the past 24 hours.

## 2019-09-22 NOTE — SUBJECTIVE & OBJECTIVE
Medications:  Continuous Infusions:   dextrose 5 % 125 mL/hr at 09/22/19 1003     Scheduled Meds:   albuterol-ipratropium  3 mL Nebulization Q4H WAKE    famotidine (PF)  20 mg Intravenous BID    lactulose  200 g Rectal TID    levothyroxine  88 mcg Intravenous Daily     PRN Meds:benzocaine, Dextrose 10% Bolus, Dextrose 10% Bolus, glucagon (human recombinant), insulin aspart U-100, lidocaine, ondansetron, promethazine (PHENERGAN) IVPB, sodium chloride 0.9%     No current facility-administered medications on file prior to encounter.      Current Outpatient Medications on File Prior to Encounter   Medication Sig    azaTHIOprine (IMURAN) 50 mg Tab TAKE 1 TABLET(50 MG) BY MOUTH EVERY DAY    CHOLECALCIFEROL, VITAMIN D3, (VITAMIN D3 ORAL) Take 1,000 mg by mouth once daily.    furosemide (LASIX) 40 MG tablet TAKE 1 TABLET(40 MG) BY MOUTH EVERY DAY    levothyroxine (SYNTHROID, LEVOTHROID) 175 MCG tablet Take 1 tablet (175 mcg total) by mouth before breakfast. Wait at least 1 hour after taking LT4 to take PPI    pantoprazole (PROTONIX) 40 MG tablet TAKE 1 TABLET(40 MG) BY MOUTH EVERY DAY    spironolactone (ALDACTONE) 100 MG tablet TAKE 1 TABLET(100 MG) BY MOUTH EVERY DAY    XIFAXAN 550 mg Tab TAKE 1 TABLET(550 MG) BY MOUTH TWICE DAILY       Review of patient's allergies indicates:   Allergen Reactions    Aspirin (bulk) Other (See Comments)    Heparin analogues Other (See Comments)     Difficulty to arouse    Nsaids (non-steroidal anti-inflammatory drug)        Past Medical History:   Diagnosis Date    Acid reflux     Anemia     Arthritis     Autoimmune hepatitis 10/23/2012      Ref. Range 2/6/2012 09:38  BRANT Latest Range: Neg <1:160  Pos, Titer to follow (A)  BRANT HEP-2 Titer Latest Range: Neg <1:160 titer Pos 1:320 (A)  BRANT Hep-2 Pattern No range found Homogeneous (A)  Anti-SSA Antibody Latest Range: <20 EU 1.68  Anti-SSA Interpretation Latest Range: Negative  Negative  Anti-SSB Antibody Latest Range: <20 EU  1.76  Anti-SSB Interpretation Latest Range: Negative  Negativ    Cirrhosis     Dry mouth     Esophageal varix bleeding     Hypothyroidism     Obesity     Thrombocytopenia      Past Surgical History:   Procedure Laterality Date    BLADDER SURGERY  in      SECTION, CLASSIC      DILATION AND CURETTAGE OF UTERUS  19932 MAB    EGD (ESOPHAGOGASTRODUODENOSCOPY) N/A 11/15/2013    Performed by [facility]un [facility]man [facility] at [facility]O (2ND FLR)    ESOPHAGOGASTRODUODENOSCOPY (EGD) N/A 2015    Per[facility] Luis [facility]ran[facility]Reyes[facility]    LAPAROTOMY, EXPLORATORY N/A 2019    Performed by Zay Sánchez MD at [facility] OR 2ND FLR    TIPS PROCEDURE      TIPS revision  2012    TONSILLECTOMY, ADENOIDECTOMY       Family History     Problem Relation (Age of Onset)    Arthritis Mother    Diabetes Mother, Sister, Sister    Hypertension Sister        Tobacco Use    Smoking status: Former Smoker     Packs/day: 0.10     Types: Cigarettes    Smokeless tobacco: Never Used   Substance and Sexual Activity    Alcohol use: No     Alcohol/week: 0.0 standard drinks    Drug use: No    Sexual activity: Never     Review of Systems   Constitutional: Negative for chills and fever.   HENT: Positive for nosebleeds. Negative for drooling, facial swelling, sinus pressure, sinus pain, sore throat, trouble swallowing and voice change.    Eyes: Negative for pain and visual disturbance.   Respiratory: Negative for shortness of breath and stridor.    Gastrointestinal: Negative for nausea and vomiting.   Musculoskeletal: Negative for neck pain and neck stiffness.   Neurological: Negative for facial asymmetry, weakness and headaches.     Objective:     Vital Signs (Most Recent):  Temp: 96.5 °F (35.8 °C) (19 0820)  Pulse: 95 (19 1120)  Resp: 18 (19 1113)  BP: (!) 113/53 (19 0820)  SpO2: 99 % (19 1113) Vital Signs (24h Range):  Temp:  [96.5 °F (35.8 °C)-98.2 °F (36.8 °C)] 96.5 °F  (35.8 °C)  Pulse:  [] 95  Resp:  [16-22] 18  SpO2:  [93 %-100 %] 99 %  BP: (113-129)/(53-60) 113/53     Weight: (!) 137.8 kg (303 lb 12.7 oz)  Body mass index is 50.55 kg/m².        Physical Exam  NAD  Awake and alert  Head AT/NC  Auricles WNL AU  Nose w/ normal external appearance; dried blood in right nostril  Dry mucous membranes w/ thick mucous, anterior tongue mobile, FOM soft, OP patent w/ midline uvula  Neck soft, not TTP, normal ROM, no LAD  Normal WOB, no stridor or stertor    Flexible Fiberoptic Laryngoscopy   Nasal cavity/nasopharynx: dry nasal mucosa w/ mucosal bleeding and crusting. Normal inferior turbinates, and septum. No evidence of septal deviation or perforation. There are no visible lesions. Bilateral michelle WNL  Oropharynx: pharyngeal wall without edema, lesions, or masses. Bilateral pharyngeal tonsils WNL. BOT symmetric without masses or lingual tonsillar hypertrophy.   Hypopharynx: There are no lesions of the piriform sinuses or postcricoid area. There is not pooling of secretions.  Supraglottis: there is slight edema of the arytenoids and interarytenoid space. Epiglottis is crisp. There are no masses noted. False vocal folds without lesions.  Glottis: TVF without lesions and with normal mobility and airway patency  Subglottis: no stenosis or masses          Significant Labs:  CBC:   Recent Labs   Lab 09/22/19 0817   WBC 4.88   RBC 2.04*   HGB 7.3*   HCT 23.5*   PLT 41*   *   MCH 35.8*   MCHC 31.1*     CMP:   Recent Labs   Lab 09/22/19 0417      CALCIUM 8.9   ALBUMIN 1.5*   PROT 4.6*   *   K 4.5   CO2 24   *   BUN 42*   CREATININE 1.0   ALKPHOS 91   ALT 35   AST 95*   BILITOT 4.8*     Coagulation:   Recent Labs   Lab 09/22/19 0817   LABPROT 25.0*   INR 2.6*   APTT 53.6*       Significant Diagnostics:  None

## 2019-09-22 NOTE — HPI
57 y/o F with Hx of AIH, cirrhosis with splenomegaly and low-volume ascites, portal HTN s/p TIPS (2012), reflux esophagitis (on protonix), hypothyroidism, CKD III, and morbid obesity who presented to ED w/ abdominal pain and subsequently was found to have gastric perforation on ex lap. After surgery she was noted to have some dysphagia and thick sputum. ENT consulted for evaluation of dysphagia.     Patient states she does not notice any aspiration and believes she can swallow okay. She c/o dry mouth and nose and productive cough of thick sputum. Her voice is normal and denies any globus, odynophagia, or dysphagia. Patient was intubated only for the procedure and has had no h/o chest or neck surgery. Denies stridor.

## 2019-09-23 PROBLEM — K92.2 ACUTE GI BLEEDING: Status: ACTIVE | Noted: 2019-01-01

## 2019-09-23 NOTE — PROGRESS NOTES
[facility]  General Surgery  Progress Note    Subjective:     History of Present Illness:  No notes on file    Post-Op Info:  Procedure(s) (LRB):  LAPAROTOMY, EXPLORATORY (N/A)   10 Days Post-Op     Interval History: Transferred to SICU o/n after having several bloody BMs, drop in H/H, and tachycardia.  Received 2u PRBC.  R IJ central line placed and briefly on low dose levo.    Medications:  Continuous Infusions:   dextrose 5 % Stopped (09/23/19 0400)    norepinephrine bitartrate-D5W Stopped (09/23/19 0500)     Scheduled Meds:   albuterol-ipratropium  3 mL Nebulization Q4H WAKE    famotidine (PF)  20 mg Intravenous BID    lactulose  200 g Rectal TID    levothyroxine  88 mcg Intravenous Daily     PRN Meds:sodium chloride, benzocaine, Dextrose 10% Bolus, Dextrose 10% Bolus, glucagon (human recombinant), insulin aspart U-100, lidocaine, ondansetron, promethazine (PHENERGAN) IVPB, sodium chloride 0.9%     Review of patient's allergies indicates:   Allergen Reactions    Aspirin (bulk) Other (See Comments)    Heparin analogues Other (See Comments)     Difficulty to arouse    Nsaids (non-steroidal anti-inflammatory drug)      Objective:     Vital Signs (Most Recent):  Temp: 98.1 °F (36.7 °C) (09/23/19 0301)  Pulse: 105 (09/23/19 0615)  Resp: (!) 25 (09/23/19 0615)  BP: (!) 89/53 (09/23/19 0615)  SpO2: 98 % (09/23/19 0615) Vital Signs (24h Range):  Temp:  [96.3 °F (35.7 °C)-98.4 °F (36.9 °C)] 98.1 °F (36.7 °C)  Pulse:  [] 105  Resp:  [16-29] 25  SpO2:  [92 %-100 %] 98 %  BP: ()/(41-62) 89/53     Weight: 134.3 kg (296 lb 1.2 oz)  Body mass index is 49.27 kg/m².    Intake/Output - Last 3 Shifts       09/21 0700 - 09/22 0659 09/22 0700 - 09/23 0659 09/23 0700 - 09/24 0659    P.O. 0 0     I.V. (mL/kg) 3081.3 (22.4) 1954.2 (14.6)     Blood  430     Other 0      IV Piggyback  2000     Total Intake(mL/kg) 3081.3 (22.4) 4384.2 (32.6)     Urine (mL/kg/hr) 0 (0) 295 (0.1)     Emesis/NG output  0      Other 0 75     Stool 0 0     Blood 0      Total Output 0 370     Net +3081.3 +4014.2            Urine Occurrence 7 x 1 x     Stool Occurrence 6 x 7 x     Emesis Occurrence 0 x            Physical Exam   Constitutional: She appears well-developed and well-nourished. No distress.   HENT:   Head: Normocephalic and atraumatic.   Cardiovascular: Normal rate and regular rhythm.   Pulmonary/Chest: Effort normal. No respiratory distress.   Abdominal:   Soft, obese  Appropriate TTP  Wound vac in place       Significant Labs:  CBC:   Recent Labs   Lab 09/23/19  0603   WBC 9.27   RBC 2.27*   HGB 7.7*   HCT 25.2*   PLT 42*   *   MCH 33.9*   MCHC 30.6*     CMP:   Recent Labs   Lab 09/23/19  0400      CALCIUM 8.1*   ALBUMIN 1.4*   PROT 4.3*      K 4.5   CO2 21*      BUN 44*   CREATININE 1.1   ALKPHOS 81   ALT 30   AST 85*   BILITOT 5.5*     Coagulation:   Recent Labs   Lab 09/23/19  0400   LABPROT 24.2*   INR 2.5*   APTT 46.3*       Significant Diagnostics:  I have reviewed all pertinent imaging results/findings within the past 24 hours.    Assessment/Plan:     * Small bowel perforation  58F s/p 9/13 Ex-Lap and small bowel resection. Transferred to SICU early AM 9/23 for new GI bleed.    - Appreciate SICU assistance  - NPO, mIVF  - russ for strict I/Os  - q4h CBC  - GI consult for scope  - RUQ U/S to eval TIPS  - prn pain/nausea meds  - Start Protonix for GI bleed  - PT/OT rec, HH      Hypothyroidism  Levothyroxine         Roslyn Levin MD  General Surgery  [facility]

## 2019-09-23 NOTE — PLAN OF CARE
POC reviewed with pt and family.  Pt A & O x 4, although pt slept a lot today. Pt incontinent of urine and stool. Lactulose enemas given twice today, stool was dark brown with red tinge. Vital signs stable on 2L O2 by NC, added humidifier container to O2. Pt denied pain.  Pt NSR on telemetry.  Blood sugar WDL, no coverage needed.

## 2019-09-23 NOTE — PROCEDURES
"Zaira Gupta is a 58 y.o. female patient.    Temp: 98 °F (36.7 °C) (09/23/19 0221)  Pulse: 107 (09/23/19 0221)  Resp: (!) 27 (09/23/19 0221)  BP: (!) 100/51 (09/23/19 0221)  SpO2: 99 % (09/23/19 0221)  Weight: 134.3 kg (296 lb 1.2 oz) (09/23/19 0042)  Height: 5' 5" (165.1 cm) (09/13/19 0400)       Central Line  Date/Time: 9/23/2019 2:32 AM  Location procedure was performed: [facility] GENERAL SURGERY  Performed by: Aly Cabello MD  Pre-operative Diagnosis: GI bleed  Post-operative diagnosis: GI bleed  Consent Done: Yes  Time out: Immediately prior to procedure a "time out" was called to verify the correct patient, procedure, equipment, support staff and site/side marked as required.  Indications: vascular access and med administration  Anesthesia: local infiltration    Anesthesia:  Local Anesthetic: lidocaine 1% without epinephrine  Anesthetic total: 3 mL  Preparation: skin prepped with ChloraPrep  Skin prep agent dried: skin prep agent completely dried prior to procedure  Sterile barriers: all five maximum sterile barriers used - cap, mask, sterile gown, sterile gloves, and large sterile sheet  Hand hygiene: hand hygiene performed prior to central venous catheter insertion  Location details: right internal jugular  Catheter type: triple lumen  Catheter size: 7 Fr  Catheter Length: 16cm    Ultrasound guidance: yes  Vessel Caliber: small, compressibility normal  Needle advanced into vessel with real time Ultrasound guidance.  Guidewire confirmed in vessel.  Sterile sheath used.  Number of attempts: 1  Post-procedure assessment: chest xray ordered.  Estimated blood loss (mL): 3  Post-procedure: line sutured  Complications: No          Aly Cabello  9/23/2019  "

## 2019-09-23 NOTE — H&P
[facility]  Critical Care - Surgery  History & Physical    Patient Name: Zaira Gupta  MRN: [id number]  Admission Date: 2019  Code Status: Full Code  Attending Physician: Zay Sánchez MD   Primary Care Provider: Minna Canchola NP   Principal Problem: Small bowel perforation    Subjective:     HPI:Ms. Gupta is our 57yo Female with a past medical history of obesity, autoimmune hepatitis, cirrhosis, hypothyroidism, CKD stage III, and had a TIPS procedure.  Stepped up to the SICU due to bright red and dark stool.  Secondary to bleeding is also having hypotension and need for pressors.    Hospital/ICU Course: Upon arrival to the SICU patient was started on Levo in order to control blood pressure.  Central line was started.  A. Line was started.  Patient was transfused Lactated ringers and PRBCs with improvement in blood pressures.    Follow-up For: Procedure(s) (LRB):  LAPAROTOMY, EXPLORATORY (N/A)    Post-Operative Day: 10 Days Post-Op     Past Medical History:   Diagnosis Date    Acid reflux     Anemia     Arthritis     Autoimmune hepatitis 10/23/2012      Ref. Range 2012 09:38  BRANT Latest Range: Neg <1:160  Pos, Titer to follow (A)  BRANT HEP-2 Titer Latest Range: Neg <1:160 titer Pos 1:320 (A)  BRANT Hep-2 Pattern No range found Homogeneous (A)  Anti-SSA Antibody Latest Range: <20 EU 1.68  Anti-SSA Interpretation Latest Range: Negative  Negative  Anti-SSB Antibody Latest Range: <20 EU 1.76  Anti-SSB Interpretation Latest Range: Negative  Negativ    Cirrhosis     Dry mouth     Esophageal varix bleeding     Hypothyroidism     Obesity     Thrombocytopenia        Past Surgical History:   Procedure Laterality Date    BLADDER SURGERY  in      SECTION, CLASSIC      DILATION AND CURETTAGE OF UTERUS      2/2 MAB    EGD (ESOPHAGOGASTRODUODENOSCOPY) N/A 11/15/2013    [facility] (2ND FLR)    ESOPHAGOGASTRODUODENOSCOPY  (EGD) N/A 4/14/2015    Per[facility] by Luis [facility]ran[facility]Reyes[facility] at [facility] ENDO    LAPAROTOMY, EXPLORATORY N/A 9/13/2019    [facility]    TIPS PROCEDURE      TIPS revision  7/5/2012    TONSILLECTOMY, ADENOIDECTOMY         Review of patient's allergies indicates:   Allergen Reactions    Aspirin (bulk) Other (See Comments)    Heparin analogues Other (See Comments)     Difficulty to arouse    Nsaids (non-steroidal anti-inflammatory drug)        Family History     Problem Relation (Age of Onset)    Arthritis Mother    Diabetes Mother, Sister, Sister    Hypertension Sister        Tobacco Use    Smoking status: Former Smoker     Packs/day: 0.10     Types: Cigarettes    Smokeless tobacco: Never Used   Substance and Sexual Activity    Alcohol use: No     Alcohol/week: 0.0 standard drinks    Drug use: No    Sexual activity: Never        Objective:     Vital Signs (Most Recent):  Temp: 98.1 °F (36.7 °C) (09/23/19 0301)  Pulse: 108 (09/23/19 0430)  Resp: (!) 26 (09/23/19 0430)  BP: (!) 114/53 (09/23/19 0415)  SpO2: 100 % (09/23/19 0430) Vital Signs (24h Range):  Temp:  [96.3 °F (35.7 °C)-98.4 °F (36.9 °C)] 98.1 °F (36.7 °C)  Pulse:  [] 108  Resp:  [16-29] 26  SpO2:  [92 %-100 %] 100 %  BP: ()/(41-62) 114/53     Weight: 134.3 kg (296 lb 1.2 oz)  Body mass index is 49.27 kg/m².      Intake/Output Summary (Last 24 hours) at 9/23/2019 0434  Last data filed at 9/23/2019 0400  Gross per 24 hour   Intake 4634.17 ml   Output 260 ml   Net 4374.17 ml       Physical Exam   Constitutional:   Morbidly obese   HENT:   Head: Normocephalic and atraumatic.   Eyes: Pupils are equal, round, and reactive to light. Conjunctivae and EOM are normal.   Neck: Normal range of motion. Neck supple.   Cardiovascular: Regular rhythm.   Tachycardic   Pulmonary/Chest: Effort normal.   Abdominal: Soft. She exhibits no distension. There is no tenderness.   Having dark foul smelling stools.    Musculoskeletal: Normal range of motion.   Edema of distal extremities   Neurological: She is alert.   Skin: Skin is warm and dry.       Vents:  Oxygen Concentration (%): 28 (09/22/19 2349)    Lines/Drains/Airways     Central Venous Catheter Line                 Percutaneous Central Line Insertion/Assessment - triple lumen  09/23/19 0300 right internal jugular less than 1 day          Drain                 Urethral Catheter 09/23/19 0130 Double-lumen less than 1 day          Peripheral Intravenous Line                 Midline Catheter Insertion/Assessment  - Single Lumen 09/16/19 1032 Right brachial vein 18g x 10cm 6 days                Significant Labs:    CBC/Anemia Profile:  Recent Labs   Lab 09/22/19 2217 09/22/19  2343 09/23/19  0400   WBC 7.65 8.09 15.33*   HGB 5.7* 5.7* 8.5*   HCT 18.5* 19.4* 27.8*   PLT 41* 46* 52*   * 121* 112*   RDW 23.8* 23.7* 21.2*        Chemistries:  Recent Labs   Lab 09/21/19 1808 09/22/19 0417    147*   K 4.5 4.5   * 112*   CO2 26 24   BUN 45* 42*   CREATININE 1.1 1.0   CALCIUM 9.4 8.9   ALBUMIN 1.6* 1.5*   PROT 4.6* 4.6*   BILITOT 4.8* 4.8*   ALKPHOS 89 91   ALT 37 35   * 95*   MG  --  2.1   PHOS  --  2.8       A1C: No results for input(s): HGBA1C in the last 48 hours.  ABGs:   Recent Labs   Lab 09/22/19 2346   PH 7.440   PCO2 36.3   HCO3 24.6   POCSATURATED 97   BE 0     Blood Culture: No results for input(s): LABBLOO in the last 48 hours.  CMP:   Recent Labs   Lab 09/21/19 1808 09/22/19 0417 09/23/19  0400    147* 141   K 4.5 4.5 4.5   * 112* 109   CO2 26 24 21*   * 101 101   BUN 45* 42* 44*   CREATININE 1.1 1.0 1.1   CALCIUM 9.4 8.9 8.1*   PROT 4.6* 4.6* 4.3*   ALBUMIN 1.6* 1.5* 1.4*   BILITOT 4.8* 4.8* 5.5*   ALKPHOS 89 91 81   * 95* 85*   ALT 37 35 30   ANIONGAP 7* 11 11   EGFRNONAA 56.3* >60.0 56.3*     Coagulation:   Recent Labs   Lab 09/23/19  0400   INR 2.5*   APTT 46.3*     Lactic Acid: No results for input(s):  LACTATE in the last 48 hours.  Troponin: No results for input(s): TROPONINI in the last 48 hours.    Significant Imaging: I have reviewed all pertinent imaging results/findings within the past 24 hours.    Assessment/Plan:     Active Diagnoses:    Diagnosis Date Noted POA    PRINCIPAL PROBLEM:  Small bowel perforation [K63.1] 09/14/2019 Yes    Epistaxis [R04.0] 09/22/2019 Unknown    Dysphagia [R13.10] 09/22/2019 Unknown    Open wound of abdomen [S31.109A] 09/19/2019 Yes    Alteration in skin integrity [R23.9] 09/18/2019 Yes    Acute confusional state [F05]  Yes    Gastric perforation [K25.5] 09/16/2019 Yes    Cirrhosis [K74.60] 11/15/2013 Yes    Hypothyroidism [E03.9] 03/26/2013 Yes      Problems Resolved During this Admission:    Diagnosis Date Noted Date Resolved POA    Gastric perforation [K25.5] 09/13/2019 09/14/2019 Yes       Neuro:   - Alert and following commands  - Pain: dilaudid PRN    Cardiac:  - MAP goal > 60  - Systolic <170  - start aurora  - start CVC  - Hypotension: levo, wean as tolerated  - Transfuse LR and PRBC's as needed    Pulmonary:   - Not intubated  - Nasal canula PRN  - Wean oxygen requirements as tolerated  - Respiratory treatments  - Pulmonary toilet    Oxygen Concentration (%):  [2-28] 28    Renal:   - monitor Is and Os  - trend BMP   - start russ    - BUN/Cr   BUN, Bld   Date Value Ref Range Status   09/23/2019 44 (H) 6 - 20 mg/dL Final   09/22/2019 42 (H) 6 - 20 mg/dL Final     Creatinine   Date Value Ref Range Status   09/23/2019 1.1 0.5 - 1.4 mg/dL Final   09/22/2019 1.0 0.5 - 1.4 mg/dL Final       Infectious Disease:   - WBC trending     Lab Results   Component Value Date    WBC 9.44 09/23/2019       Hematology/Oncology:  - H/H trending   - Transfuse units of blood as needed for GI bleed    Lab Results   Component Value Date    WBC 9.44 09/23/2019    HGB 7.4 (L) 09/23/2019    HCT 24.1 (L) 09/23/2019     (H) 09/23/2019    PLT 58 (L) 09/23/2019       Endocrine:  - SSI  as needed  - POCT glucose Q6    F:   Fluids/Electrolytes (From admission, onward)    Start     Stop Route Frequency Ordered    09/20/19 1021  dextrose 10% (D10W) Bolus  (D10W hypoglycemia orders during D50W shortage)      -- IV As needed (PRN) 09/20/19 0921    09/20/19 1021  dextrose 10% (D10W) Bolus  (D10W hypoglycemia orders during D50W shortage)      -- IV As needed (PRN) 09/20/19 0921    09/20/19 1003  glucagon (human recombinant) injection 1 mg      -- IM As needed (PRN) 09/20/19 0904    09/18/19 0845  dextrose 5 % infusion      09/18 1459 IV Continuous 09/18/19 0740    09/13/19 0506  sodium chloride 0.9% flush 10 mL      -- IV As needed (PRN) 09/13/19 0411         E:   Recent Labs   Lab 09/23/19  0400      K 4.5      CO2 21*   BUN 44*   CREATININE 1.1   MG 1.9      N: NPO    GI:   - replace electrolytes as needed to keep K>4, Mg>2  - famotidine for GI prophylaxis  - Consult GI for scope    Dispo:  - continue care in the SICU       Eran Gomez MD  Critical Care - Surgery  [facility]

## 2019-09-23 NOTE — SUBJECTIVE & OBJECTIVE
Interval History: Transferred to SICU o/n after having several bloody BMs, drop in H/H, and tachycardia.  Received 2u PRBC.  R IJ central line placed and briefly on low dose levo.    Medications:  Continuous Infusions:   dextrose 5 % Stopped (09/23/19 0400)    norepinephrine bitartrate-D5W Stopped (09/23/19 0500)     Scheduled Meds:   albuterol-ipratropium  3 mL Nebulization Q4H WAKE    famotidine (PF)  20 mg Intravenous BID    lactulose  200 g Rectal TID    levothyroxine  88 mcg Intravenous Daily     PRN Meds:sodium chloride, benzocaine, Dextrose 10% Bolus, Dextrose 10% Bolus, glucagon (human recombinant), insulin aspart U-100, lidocaine, ondansetron, promethazine (PHENERGAN) IVPB, sodium chloride 0.9%     Review of patient's allergies indicates:   Allergen Reactions    Aspirin (bulk) Other (See Comments)    Heparin analogues Other (See Comments)     Difficulty to arouse    Nsaids (non-steroidal anti-inflammatory drug)      Objective:     Vital Signs (Most Recent):  Temp: 98.1 °F (36.7 °C) (09/23/19 0301)  Pulse: 105 (09/23/19 0615)  Resp: (!) 25 (09/23/19 0615)  BP: (!) 89/53 (09/23/19 0615)  SpO2: 98 % (09/23/19 0615) Vital Signs (24h Range):  Temp:  [96.3 °F (35.7 °C)-98.4 °F (36.9 °C)] 98.1 °F (36.7 °C)  Pulse:  [] 105  Resp:  [16-29] 25  SpO2:  [92 %-100 %] 98 %  BP: ()/(41-62) 89/53     Weight: 134.3 kg (296 lb 1.2 oz)  Body mass index is 49.27 kg/m².    Intake/Output - Last 3 Shifts       09/21 0700 - 09/22 0659 09/22 0700 - 09/23 0659 09/23 0700 - 09/24 0659    P.O. 0 0     I.V. (mL/kg) 3081.3 (22.4) 1954.2 (14.6)     Blood  430     Other 0      IV Piggyback  2000     Total Intake(mL/kg) 3081.3 (22.4) 4384.2 (32.6)     Urine (mL/kg/hr) 0 (0) 295 (0.1)     Emesis/NG output 0      Other 0 75     Stool 0 0     Blood 0      Total Output 0 370     Net +3081.3 +4014.2            Urine Occurrence 7 x 1 x     Stool Occurrence 6 x 7 x     Emesis Occurrence 0 x            Physical Exam    Constitutional: She appears well-developed and well-nourished. No distress.   HENT:   Head: Normocephalic and atraumatic.   Cardiovascular: Normal rate and regular rhythm.   Pulmonary/Chest: Effort normal. No respiratory distress.   Abdominal:   Soft, obese  Appropriate TTP  Wound vac in place       Significant Labs:  CBC:   Recent Labs   Lab 09/23/19  0603   WBC 9.27   RBC 2.27*   HGB 7.7*   HCT 25.2*   PLT 42*   *   MCH 33.9*   MCHC 30.6*     CMP:   Recent Labs   Lab 09/23/19  0400      CALCIUM 8.1*   ALBUMIN 1.4*   PROT 4.3*      K 4.5   CO2 21*      BUN 44*   CREATININE 1.1   ALKPHOS 81   ALT 30   AST 85*   BILITOT 5.5*     Coagulation:   Recent Labs   Lab 09/23/19  0400   LABPROT 24.2*   INR 2.5*   APTT 46.3*       Significant Diagnostics:  I have reviewed all pertinent imaging results/findings within the past 24 hours.

## 2019-09-23 NOTE — TRANSFER OF CARE
"Anesthesia Transfer of Care Note    Patient: Zaira Gupta    Procedure(s) Performed: Procedure(s) (LRB):  EGD (ESOPHAGOGASTRODUODENOSCOPY) (N/A)    Patient location: ICU    Anesthesia Type: general    Transport from OR: Transported from OR on 2-3 L/min O2 by NC with adequate spontaneous ventilation    Post pain: adequate analgesia    Post assessment: no apparent anesthetic complications and tolerated procedure well    Post vital signs: stable    Level of consciousness: sedated    Nausea/Vomiting: no nausea/vomiting    Complications: none    Transfer of care protocol was followed      Last vitals:   Visit Vitals  BP (!) 95/50 (BP Location: Left arm, Patient Position: Lying)   Pulse 101   Temp 36.9 °C (98.4 °F) (Oral)   Resp (!) 21   Ht 5' 5" (1.651 m)   Wt 134.3 kg (296 lb 1.2 oz)   LMP  (LMP Unknown)   SpO2 98%   Breastfeeding? No   BMI 49.27 kg/m²     "

## 2019-09-23 NOTE — PROGRESS NOTES
Patient seen for wound care follow up for abdominal wound vac dressing change and skin breakdown to left arm and left hand. Recommend continuing foam dressing to left hand and left arm twice a week.    Wound vac dressing changed, cleansed with sterile normal saline, 2 black sponge pieces packed into wound, 100mmHg.    Dark maroon/purple discoloration noted throughout wound bed to abdomen. Unknown etiology. Notified Nhung MARTINEZ with general surgery. No new orders noted at this time. Nursing to continue care. Wound care to follow prn.       09/23/19 1105        Incision/Site 09/13/19 0733 Abdomen   Date First Assessed/Time First Assessed: 09/13/19 0733   Location: Abdomen   Wound Image    Incision WDL ex   Drainage Amount Small   Drainage Characteristics/Odor Serosanguineous   Appearance Moist;Red;Adipose  (sutures noted to base, dark maroon discoloration)   Periwound Area Intact   Wound Edges Open   Care Cleansed with:;Sterile normal saline   Dressing Changed  (wound vac)   Periwound Care Dry periwound area maintained   Dressing Change Due 09/26/19        Negative Pressure Wound Therapy    Placement Date/Time: 09/16/19 1400   Orientation: midline  Location: Abdomen   NPWT Type Vacuum Therapy   Therapy Setting NPWT Continuous therapy   Pressure Setting NPWT 100 mmHg   Therapy Interventions NPWT Dressing changed   Sponges Inserted NPWT 2;Black        Wound 09/16/19 0700 Skin Tear upper Arm #1   Date First Assessed/Time First Assessed: 09/16/19 0700   Primary Wound Type: Skin Tear  Side: Left  Orientation: upper  Location: Arm  Wound/PI Number (optional): #1   Wound Image    Wound WDL ex   Drainage Amount Small   Drainage Characteristics/Odor Serous;No odor   Appearance Red;Moist   Tissue loss description Partial thickness   Periwound Area Intact   Wound Length (cm) 3 cm   Wound Width (cm) 3 cm   Wound Depth (cm) 0.1 cm   Wound Volume (cm^3) 0.9 cm^3   Wound Surface Area (cm^2) 9 cm^2   Dressing Foam     Left hand  ecchymosis, draining scant amount of serous to serosanguineous drainage.

## 2019-09-23 NOTE — [employer] PATIENT INSTRUCTIONS
Discharge Summary/Instructions after an Endoscopic Procedure  Patient Name: Zaira Gupta  Patient MRN: [id number]  Patient YOB: 1960  Monday, September 23, 2019  Silverio Tran MD  RESTRICTIONS:  During your procedure today, you received medications for sedation.  These   medications may affect your judgment, balance and coordination.  Therefore,   for 24 hours, you have the following restrictions:   - DO NOT drive a car, operate machinery, make legal/financial decisions,   sign important papers or drink alcohol.    ACTIVITY:  Today: no heavy lifting, straining or running due to procedural   sedation/anesthesia.  The following day: return to full activity including work.  DIET:  Eat and drink normally unless instructed otherwise.     TREATMENT FOR COMMON SIDE EFFECTS:  - Mild abdominal pain, nausea, belching, bloating or excessive gas:  rest,   eat lightly and use a heating pad.  - Sore Throat: treat with throat lozenges and/or gargle with warm salt   water.  - Because air was used during the procedure, expelling large amounts of air   from your rectum or belching is normal.  - If a bowel prep was taken, you may not have a bowel movement for 1-3 days.    This is normal.  SYMPTOMS TO WATCH FOR AND REPORT TO YOUR PHYSICIAN:  1. Abdominal pain or bloating, other than gas cramps.  2. Chest pain.  3. Back pain.  4. Signs of infection such as: chills or fever occurring within 24 hours   after the procedure.  5. Rectal bleeding, which would show as bright red, maroon, or black stools.   (A tablespoon of blood from the rectum is not serious, especially if   hemorrhoids are present.)  6. Vomiting.  7. Weakness or dizziness.  GO DIRECTLY TO THE NEAREST EMERGENCY ROOM IF YOU HAVE ANY OF THE FOLLOWING:      Difficulty breathing              Chills and/or fever over 101 F   Persistent vomiting and/or vomiting blood   Severe abdominal pain   Severe chest pain   Black, tarry stools   Bleeding- more than one  tablespoon   Any other symptom or condition that you feel may need urgent attention  Your doctor recommends these additional instructions:  If any biopsies were taken, your doctors clinic will contact you in 1 to 2   weeks with any results.  - Return patient to hospital [facility] for ongoing care.   - Consider CTA if the bleeding continues.  For questions, problems or results please call your physician - Silverio Tran MD at Work:  [phone].  [facility], EMERGENCY ROOM PHONE NUMBER: [phone]  IF A COMPLICATION OR EMERGENCY SITUATION ARISES AND YOU ARE UNABLE TO REACH   YOUR PHYSICIAN - GO DIRECTLY TO THE EMERGENCY ROOM.  Silverio Tran MD  9/23/2019 3:15:19 PM  This report has been verified and signed electronically.  [employer]

## 2019-09-23 NOTE — ANESTHESIA POSTPROCEDURE EVALUATION
Anesthesia Post Evaluation    Patient: Zaira Gupta    Procedure(s) Performed: Procedure(s) (LRB):  EGD (ESOPHAGOGASTRODUODENOSCOPY) (N/A)    Final Anesthesia Type: general  Patient location during evaluation: ICU  Patient participation: Yes- Able to Participate  Level of consciousness: awake and alert  Post-procedure vital signs: reviewed and stable  Pain management: adequate  Airway patency: patent  PONV status at discharge: No PONV  Anesthetic complications: no      Cardiovascular status: hemodynamically stable  Respiratory status: unassisted  Hydration status: euvolemic  Follow-up not needed.          Vitals Value Taken Time   /56 9/23/2019  4:25 PM   Temp 36.8 °C (98.3 °F) 9/23/2019  3:00 PM   Pulse 104 9/23/2019  4:27 PM   Resp 19 9/23/2019  4:27 PM   SpO2 97 % 9/23/2019  4:27 PM   Vitals shown include unvalidated device data.      No case tracking events are documented in the log.      Pain/Sheron Score: Pain Rating Prior to Med Admin: 0 (9/23/2019 11:01 AM)  Pain Rating Post Med Admin: 0 (9/23/2019 11:01 AM)

## 2019-09-23 NOTE — ANESTHESIA PREPROCEDURE EVALUATION
[facility]  Anesthesia Pre-Operative Evaluation         Patient Name: Zaira Gupta  YOB: 1960  MRN: [id number]    SUBJECTIVE:     Pre-operative evaluation for Procedure(s) (LRB):  EGD (ESOPHAGOGASTRODUODENOSCOPY) (N/A)     09/23/2019    Zaira Gupta is a 58 y.o. female w/ a significant PMHx of obesity, autoimmune hepatitis, cirrhosis, hypothyroidism, CKD stage 3, and S/P TIPS transferred from [facility] ED for CT findings suggestive of intraabdominal perforation now s/p ex-lap. Transferred to SICU for large, bloody BM with dyspnea, tachycardia, and significant drop in Hgb. Received 2 U PRBCs, 2 L of additional fluids, was on norepinephrine but titrated off approximately 4 hours ago.    Patient appropriately NPO.   Access: R IJ TLC, Right Midline (likely infiltrated.)     Consent obtained with daughter over phone:  Maddie (daughter) [phone]    Patient now presents for the above procedure(s).      LDA:        Percutaneous Central Line Insertion/Assessment - triple lumen  09/23/19 0300 right internal jugular (Active)   Dressing biopatch in place;dressing reinforced;dressing dry and intact 9/23/2019  7:01 AM   Securement secured w/ sutures 9/23/2019  7:01 AM   Additional Site Signs no erythema;no warmth;no edema;no pain;no palpable cord;no streak formation;no drainage 9/23/2019  7:01 AM   Distal Patency/Care flushed w/o difficulty;infusing 9/23/2019  7:01 AM   Medial Patency/Care flushed w/o difficulty;normal saline locked 9/23/2019  7:01 AM   Proximal Patency/Care flushed w/o difficulty;normal saline locked 9/23/2019  7:01 AM   Waveform normal 9/23/2019  3:01 AM   Line Interventions line leveled/zeroed;blood specimen obtained and sent to lab 9/23/2019  3:01 AM   Dressing Change Due 09/30/19 9/23/2019  7:01 AM   Daily Line Review Performed 9/23/2019  7:01 AM   Number of days: 0            Midline Catheter Insertion/Assessment  - Single Lumen 09/16/19 1032 Right brachial vein  "18g x 10cm (Active)   Site Assessment Clean;Dry;Intact;No redness;No swelling 9/23/2019  7:01 AM   IV Device Securement catheter securement device 9/23/2019  7:01 AM   Line Status Saline locked 9/23/2019  7:01 AM   Dressing Status Dry;Clean;Intact 9/23/2019  7:01 AM   Dressing Intervention Dressing reinforced 9/23/2019  7:01 AM   Dressing Change Due 09/23/19 9/23/2019  7:01 AM   Site Change Due 10/15/19 9/23/2019  7:01 AM   Reason Not Rotated Not due 9/23/2019  7:01 AM   Number of days: 7            Urethral Catheter 09/23/19 0130 Double-lumen (Active)   Site Assessment Clean;Intact;Dry 9/23/2019  7:01 AM   Collection Container Urimeter 9/23/2019  7:01 AM   Securement Method secured to top of thigh w/ adhesive device 9/23/2019  7:01 AM   Catheter Care Performed yes 9/23/2019  7:01 AM   Reason for Continuing Urinary Catheterization Critically ill in ICU requiring intensive monitoring 9/23/2019  7:01 AM   CAUTI Prevention Bundle StatLock in place w 1" slack;Intact seal between catheter & drainage tubing;Drainage bag/urimeter off the floor;Green sheeting clip in use;No dependent loops or kinks;Drainage bag/urimeter not overfilled (<2/3 full);Drainage bag/urimeter below bladder 9/23/2019  7:01 AM   Output (mL) 20 mL 9/23/2019  9:00 AM   Number of days: 0       Prev airway: RSI. Grade I view on Glidescope, view complicated by obesity and oropharyngeal fat.     Drips:    norepinephrine bitartrate-D5W 0.02 mcg/kg/min (09/23/19 0900)    octreotide (SANDOSTATIN) infusion 50 mcg/hr (09/23/19 1046)    pantoprozole (PROTONIX) IV infusion 8 mg/hr (09/23/19 1046)       Patient Active Problem List   Diagnosis    Autoimmune hepatitis    Esophageal varix bleeding    S/P TIPS (transjugular intrahepatic portosystemic shunt)    Hypothyroidism    Cirrhosis    Pancytopenia    CKD (chronic kidney disease) stage 3, GFR 30-59 ml/min    Morbid obesity with BMI of 45.0-49.9, adult    Osteoarthritis    Vitamin D deficiency    " Sleep apnea    Hidradenitis suppurativa    Dental caries    Periodontal disease    Odontogenic cyst    Epigastric pain    Perforation of intestine    Small bowel perforation    Gastric perforation    Alteration in skin integrity    Acute confusional state    Open wound of abdomen    Epistaxis    Dysphagia    Acute GI bleeding       Review of patient's allergies indicates:   Allergen Reactions    Aspirin (bulk) Other (See Comments)    Heparin analogues Other (See Comments)     Difficulty to arouse    Nsaids (non-steroidal anti-inflammatory drug)        Current Inpatient Medications:   albuterol-ipratropium  3 mL Nebulization Q4H WAKE    cefTRIAXone  2 g Intravenous Q24H    lactulose  200 g Rectal TID    levothyroxine  88 mcg Intravenous Daily       No current facility-administered medications on file prior to encounter.      Current Outpatient Medications on File Prior to Encounter   Medication Sig Dispense Refill    azaTHIOprine (IMURAN) 50 mg Tab TAKE 1 TABLET(50 MG) BY MOUTH EVERY DAY 30 tablet 0    CHOLECALCIFEROL, VITAMIN D3, (VITAMIN D3 ORAL) Take 1,000 mg by mouth once daily.      furosemide (LASIX) 40 MG tablet TAKE 1 TABLET(40 MG) BY MOUTH EVERY DAY 30 tablet 6    levothyroxine (SYNTHROID, LEVOTHROID) 175 MCG tablet Take 1 tablet (175 mcg total) by mouth before breakfast. Wait at least 1 hour after taking LT4 to take PPI 90 tablet 3    pantoprazole (PROTONIX) 40 MG tablet TAKE 1 TABLET(40 MG) BY MOUTH EVERY DAY 90 tablet 0    spironolactone (ALDACTONE) 100 MG tablet TAKE 1 TABLET(100 MG) BY MOUTH EVERY DAY 90 tablet 0    XIFAXAN 550 mg Tab TAKE 1 TABLET(550 MG) BY MOUTH TWICE DAILY 60 tablet 0       Past Surgical History:   Procedure Laterality Date    BLADDER SURGERY  in      SECTION, CLASSIC      DILATION AND CURETTAGE OF UTERUS      2[facility]2 [facility]    EGD (ESOPHAGOGASTRODUODENOSCOPY) N/A 11/15/2013    Performed by [facility] at [facility] ([facility] F[facility])     ESOPHAGOGASTRODUODENOSCOPY (EGD) N/A 4/14/2015    Performed by Luis Bogran-Reyes, [facility] at [facility] ENDO    LAPAROTOMY, EXPLORATORY N/A 9/13/2019    Performed by Zay S[facility]chez [facility] at [facility] OR [facility]    TIPS PROCEDURE      TIPS revision  7/5/2012    TONSILLECTOMY, ADENOIDECTOMY         Social History     Socioeconomic History    Marital status: Single     Spouse name: Not on file    Number of children: 1    Years of education: Not on file    Highest education level: Not on file   Occupational History    Not on file   Social Needs    Financial resource strain: Not on file    Food insecurity:     Worry: Not on file     Inability: Not on file    Transportation needs:     Medical: Not on file     Non-medical: Not on file   Tobacco Use    Smoking status: Former Smoker     Packs/day: 0.10     Types: Cigarettes    Smokeless tobacco: Never Used   Substance and Sexual Activity    Alcohol use: No     Alcohol/week: 0.0 standard drinks    Drug use: No    Sexual activity: Never   Lifestyle    Physical activity:     Days per week: Not on file     Minutes per session: Not on file    Stress: Not on file   Relationships    Social connections:     Talks on phone: Not on file     Gets together: Not on file     Attends [employer] service: Not on file     Active member of club or organization: Not on file     Attends meetings of clubs or organizations: Not on file     Relationship status: Not on file   Other Topics Concern    Not on file   Social History Narrative    Not on file       OBJECTIVE:     Vital Signs Range (Last 24H):  Temp:  [35.7 °C (96.3 °F)-36.9 °C (98.4 °F)]   Pulse:  []   Resp:  [15-29]   BP: ()/(41-62)   SpO2:  [92 %-100 %]       Significant Labs:  Lab Results   Component Value Date    WBC 9.44 09/23/2019    HGB 7.4 (L) 09/23/2019    HCT 24.1 (L) 09/23/2019    PLT 58 (L) 09/23/2019    CHOL 218 (H) 04/07/2015    TRIG 128 04/07/2015    HDL 31 (L) 04/07/2015    ALT 30 09/23/2019    AST 85  (H) 09/23/2019     09/23/2019    K 4.5 09/23/2019     09/23/2019    CREATININE 1.1 09/23/2019    BUN 44 (H) 09/23/2019    CO2 21 (L) 09/23/2019    TSH 0.021 (L) 09/04/2019    INR 2.5 (H) 09/23/2019    HGBA1C <4.0 09/04/2019       Diagnostic Studies: No relevant studies.    EKG:   Normal sinus rhythm with Premature atrial complexes in a pattern of  bigeminy  Rightward axis  Prolonged QT    ASSESSMENT/PLAN:       Anesthesia Evaluation    I have reviewed the Patient Summary Reports.    I have reviewed the Nursing Notes.   I have reviewed the Medications.     Review of Systems  Anesthesia Hx:  No problems with previous Anesthesia  History of prior surgery of interest to airway management or planning: Previous anesthesia: General Denies Family Hx of Anesthesia complications.   Denies Personal Hx of Anesthesia complications.   Cardiovascular:   Exercise tolerance: good    Pulmonary:   Sleep Apnea    Renal/:   Chronic Renal Disease, CRI    Hepatic/GI:   GERD    Endocrine:   Hypothyroidism        Physical Exam  General:  Morbid Obesity, Jaundice    Airway/Jaw/Neck:  Airway Findings: Mouth Opening: Normal Tongue: Normal  General Airway Assessment: Adult  Mallampati: II  TM Distance: Normal, at least 6 cm  Jaw/Neck Findings:  Neck ROM: Normal ROM  Neck Findings:  Girth Increased      Dental:  Dental Findings: In tact   Chest/Lungs:  Chest/Lungs Findings: Clear to auscultation, Normal Respiratory Rate     Heart/Vascular:  Heart Findings: Rate: Normal  Rhythm: Regular Rhythm  Sounds: Normal        Mental Status:  Mental Status Findings:  Confusion         Anesthesia Plan  Type of Anesthesia, risks & benefits discussed:  Anesthesia Type:  general, MAC  Patient's Preference:   Intra-op Monitoring Plan: standard ASA monitors  Intra-op Monitoring Plan Comments:   Post Op Pain Control Plan: multimodal analgesia, IV/PO Opioids PRN and per primary service following discharge from PACU  Post Op Pain Control Plan Comments:    Induction:   IV  Beta Blocker:         Informed Consent: Patient representative understands risks and agrees with Anesthesia plan.  Questions answered. Anesthesia consent signed with patient representative.  ASA Score: 4     Day of Surgery Review of History & Physical:    H&P update referred to the provider.         Ready For Surgery From Anesthesia Perspective.

## 2019-09-23 NOTE — CONSULTS
[facility]  Gastroenterology  Consult Note    Patient Name: Zaira Gupta  MRN: [id number]  Admission Date: 9/13/2019  Hospital Length of Stay: 10 days  Code Status: Full Code   Attending Provider: Zay Sánchez MD   Consulting Provider: Silverio Tran MD  Primary Care Physician: Minna Canchola NP  Principal Problem:Small bowel perforation    Inpatient consult to Gastroenterology  Consult performed by: Srinivas Vargas MD  Consult ordered by: Ave Iglesias MD        Subjective:     HPI:  The patient is a 59 yo F with PMH autoimmune hepatitis vs NORIEGA cirrhosis, hx of portal HTN and varices s/p TIPS at [facility] in 2012, CKD, and morbid obesity who is being evaluated for a large bloody bowel movement on wards with concomitant dyspnea, tachycardia, and significant drop in H/H. She has transferred to SICU early AM 09/23 and received 2 U PRBCs, 1 L LR, 1 L NS, started levo, and has since had 2 more bloody BMs described as mostly dark blood with some bright red blood mixed in. The patient is currently alert and oriented and denies any dyspnea, dizziness, or light-headedness. She denies previous h/o bloody BMs or hematemesis. The patient is currently NPO and has been receiving rectal lactulose.  INR is currently 2.5 (2.9 on 09/21), 1.4 at admission.  The patient was initially transferred from [facility] on 09/13 for emergent ex-lap 2/2 free air noted on imaging in the setting of severe abdominal pain, nausea, and vomiting. She was found have 2cm perforation in the small bowel which was [place] [facility] [place]     Past Medical History:   Diagnosis Date    Acid reflux     Anemia     Arthritis     Autoimmune hepatitis 10/23/2012      Ref. Range 2/6/2012 09:38  BRANT Latest Range: Neg <1:160  Pos, Titer to follow (A)  BRANT HEP-2 Titer Latest Range: Neg <1:160 titer Pos 1:320 (A)  BRANT Hep-2 Pattern No range found Homogeneous (A)  Anti-SSA Antibody Latest Range: <20 EU 1.68   Anti-SSA Interpretation Latest Range: Negative  Negative  Anti-SSB Antibody Latest Range: <20 EU 1.76  Anti-SSB Interpretation Latest Range: Negative  Negativ    Cirrhosis     Dry mouth     Esophageal varix bleeding     Hypothyroidism     Obesity     Thrombocytopenia        Past Surgical History:   Procedure Laterality Date    BLADDER SURGERY  in      SECTION, CLASSIC      DILATION AND CURETTAGE OF UTERUS  1993/2 MAB    EGD (ESOPHAGOGASTRODUODENOSCOPY) N/A 11/15/2013    Performed by [facility]un [facility]man [facility] at [facility]O (2ND FLR)    ESOPHAGOGASTRODUODENOSCOPY (EGD) N/A 2015    Performed by Luis Bogran-Reyes, MD at [facility] ENDO    LAPAROTOMY, EXPLORATORY N/A 2019    Performed by Zay Sánchez MD at [facility] OR 2ND FLR    TIPS PROCEDURE      TIPS revision  2012    TONSILLECTOMY, ADENOIDECTOMY         Review of patient's allergies indicates:   Allergen Reactions    Aspirin (bulk) Other (See Comments)    Heparin analogues Other (See Comments)     Difficulty to arouse    Nsaids (non-steroidal anti-inflammatory drug)      Family History     Problem Relation (Age of Onset)    Arthritis Mother    Diabetes Mother, Sister, Sister    Hypertension Sister        Tobacco Use    Smoking status: Former Smoker     Packs/day: 0.10     Types: Cigarettes    Smokeless tobacco: Never Used   Substance and Sexual Activity    Alcohol use: No     Alcohol/week: 0.0 standard drinks    Drug use: No    Sexual activity: Never     Review of Systems   Constitutional: Positive for activity change and appetite change. Negative for chills and fever.   Respiratory: Negative for shortness of breath.    Cardiovascular: Negative for chest pain.   Gastrointestinal: Positive for blood in stool. Negative for abdominal pain (No abd pain at rest), constipation, nausea and vomiting.   Skin: Positive for wound (wound vac at site of incision).   Neurological: Negative for dizziness and light-headedness.      Objective:     Vital Signs (Most Recent):  Temp: 98.2 °F (36.8 °C) (09/23/19 0818)  Pulse: 99 (09/23/19 0900)  Resp: 18 (09/23/19 0900)  BP: (!) 100/56 (09/23/19 0900)  SpO2: 95 % (09/23/19 0900) Vital Signs (24h Range):  Temp:  [96.3 °F (35.7 °C)-98.4 °F (36.9 °C)] 98.2 °F (36.8 °C)  Pulse:  [] 99  Resp:  [15-29] 18  SpO2:  [92 %-100 %] 95 %  BP: ()/(41-62) 100/56     Weight: 134.3 kg (296 lb 1.2 oz) (09/23/19 0042)  Body mass index is 49.27 kg/m².      Intake/Output Summary (Last 24 hours) at 9/23/2019 0916  Last data filed at 9/23/2019 0900  Gross per 24 hour   Intake 4425.42 ml   Output 430 ml   Net 3995.42 ml       Lines/Drains/Airways     Central Venous Catheter Line                 Percutaneous Central Line Insertion/Assessment - triple lumen  09/23/19 0300 right internal jugular less than 1 day          Drain                 Urethral Catheter 09/23/19 0130 Double-lumen less than 1 day          Peripheral Intravenous Line                 Midline Catheter Insertion/Assessment  - Single Lumen 09/16/19 1032 Right brachial vein 18g x 10cm 6 days                Physical Exam   Constitutional: She is oriented to person, place, and time. No distress.   HENT:   Head: Normocephalic and atraumatic.   Eyes: EOM are normal.   Cardiovascular: Normal rate and regular rhythm.   Pulmonary/Chest: Effort normal and breath sounds normal. No respiratory distress.   Abdominal: Soft. Bowel sounds are normal. She exhibits distension (distension 2/2 excess adipose tissue). There is tenderness (tenderness to deep palpation in LUQ and LLQ). There is no rebound and no guarding.   Musculoskeletal: She exhibits edema (edema in b/l UEs and pitting edema to knee in LLE).   Superficial hematoma noted on dorsal surface of R hand     Neurological: She is alert and oriented to person, place, and time.   Skin: Skin is warm. She is not diaphoretic.       Significant Labs:  CBC:   Recent Labs   Lab 09/22/19  2343 09/23/19  0400  09/23/19  0603   WBC 8.09 15.33* 9.27   HGB 5.7* 8.5* 7.7*   HCT 19.4* 27.8* 25.2*   PLT 46* 52* 42*     CMP:   Recent Labs   Lab 09/23/19  0400      CALCIUM 8.1*   ALBUMIN 1.4*   PROT 4.3*      K 4.5   CO2 21*      BUN 44*   CREATININE 1.1   ALKPHOS 81   ALT 30   AST 85*   BILITOT 5.5*     Coagulation:   Recent Labs   Lab 09/23/19  0400   INR 2.5*   APTT 46.3*     Liver Function Test:   Recent Labs   Lab 09/21/19  1808 09/22/19  0417 09/23/19  0400   ALT 37 35 30   * 95* 85*   ALKPHOS 89 91 81   BILITOT 4.8* 4.8* 5.5*   PROT 4.6* 4.6* 4.3*   ALBUMIN 1.6* 1.5* 1.4*       Significant Imaging:  N/A    Assessment/Plan:     Acute GI bleeding  58 F h/o AIH, cirrhosis, portal HTN s/p TIPS (2012), and morbid obesity who was transferred to SICU for large, bloody BM with dyspnea, tachycardia, and significant drop in Hgb. Received 2 U PRBCs, 2 L of additional fluids, and on norepinephrine.     - upper endoscopy r/o variceal bleed  - start IV PPI, octreotide gtt, IV abx  - possible CTA pending results of endoscopy        Thank you for your consult. I will follow-up with patient. Please contact us if you have any additional questions.    Srinivas Vargas MD  Gastroenterology  [facility]

## 2019-09-23 NOTE — ASSESSMENT & PLAN NOTE
58F s/p 9/13 Ex-Lap and small bowel resection. Transferred to SICU early AM 9/23 for new GI bleed.    - Appreciate SICU assistance  - NPO, mIVF  - russ for strict I/Os  - q4h CBC  - GI consult for scope  - RUQ U/S to eval TIPS  - prn pain/nausea meds  - Start Protonix for GI bleed  - PT/OT rec, HH

## 2019-09-23 NOTE — PROGRESS NOTES
Pt had very large, bloody BM. Tachy, 112-115. Other VS stable on 2L NC. AM labs showed drop in H&H. MD notified, came to bedside. CBC ordered. Will continue to monitor.

## 2019-09-23 NOTE — ASSESSMENT & PLAN NOTE
58 F h/o AIH, cirrhosis, portal HTN s/p TIPS (2012), and morbid obesity who was transferred to SICU for large, bloody BM with dyspnea, tachycardia, and significant drop in Hgb. Received 2 U PRBCs, 2 L of additional fluids, and on norepinephrine.     - upper endoscopy r/o variceal bleed  - start IV PPI, octreotide gtt, IV abx  - possible CTA pending results of endoscopy

## 2019-09-23 NOTE — PROGRESS NOTES
"[facility]  Adult Nutrition  Progress Note    SUMMARY       Recommendations  Recommendation/Intervention:   1. As medically able, ADAT to Low Fiber/Residue with texture per SLP.   2. If unable to advance diet and TPN warranted, recommend Custom TPN 135g AA and 215g Dextrose + IV lipids daily - to provide 1771 kcal/day and 135g protein/day.   RD to monitor.    Goals: Patient to receive nutrition by RD follow-up  Nutrition Goal Status: new  Communication of RD Recs: discussed on rounds    Reason for Assessment  Reason For Assessment: length of stay  Diagnosis: surgery/postoperative complications(small bowel perforation s/p surgery 9/13)  Relevant Medical History: AIH, CKD3  Interdisciplinary Rounds: attended  General Information Comments: S/p ex lap & SBR 9/13. Transferred to SICU yesterday for GI bleed. has been NPO since 9/20. Patient appears nourished and no physical signs of malnutrition but at risk for acute malnutrition if continues with no nutrition.  Nutrition Discharge Planning: Unable to determine at this time.    Nutrition Risk Screen  Nutrition Risk Screen: dysphagia or difficulty swallowing    Nutrition/Diet History  Spiritual, Cultural Beliefs, Uatsdin Practices, Values that Affect Care: no(Simultaneous filing. User may not have seen previous data.)  Factors Affecting Nutritional Intake: NPO, altered gastrointestinal function    Anthropometrics  Temp: 98.4 °F (36.9 °C)  Height Method: Stated  Height: 5' 5" (165.1 cm)  Height (inches): 65 in  Weight Method: Bed Scale  Weight: 134.3 kg (296 lb 1.2 oz)  Weight (lb): 296.08 lb  Ideal Body Weight (IBW), Female: 125 lb  % Ideal Body Weight, Female (lb): 213.94 lb  BMI (Calculated): 44.6  BMI Grade: greater than 40 - morbid obesity    Lab/Procedures/Meds  Pertinent Labs Reviewed: reviewed  Pertinent Labs Comments: BUN 44, Ca 8.1, Alb 1.4  Pertinent Medications Reviewed: reviewed  Pertinent Medications Comments: famotidine, " levophed    Estimated/Assessed Needs  Weight Used For Calorie Calculations: 121.3 kg (267 lb 6.7 oz)(admit weight)  Energy Calorie Requirements (kcal): 1804 kcal/day  Energy Need Method: [place]-St Jeor(no AF 2/2 obesity)  Protein Requirements: 114-142 g/day(2.0-2.5 g/kg)  Weight Used For Protein Calculations: 56.8 kg (125 lb 3.5 oz)(IBW)  Fluid Requirements (mL): 1 mL/kcal or per MD  Estimated Fluid Requirement Method: RDA Method  RDA Method (mL): 1804    Nutrition Prescription Ordered  Current Diet Order: NPO    Evaluation of Received Nutrient/Fluid Intake  I/O: Noted  Comments: LBM 9/23  % Intake of Estimated Energy Needs: 0 - 25 %  % Meal Intake: NPO    Nutrition Risk  Level of Risk/Frequency of Follow-up: high(2x/week)     Assessment and Plan  Nutrition Problem  Inadequate energy intake    Related to (etiology):   Decreased ability to consume sufficient energy    Signs and Symptoms (as evidenced by):   NPO with no alternative means of nutrition at this time    Interventions (treatment strategy):  Collaboration of nutrition care with other providers    Nutrition Diagnosis Status:   New    Monitor and Evaluation  Food and Nutrient Intake: energy intake  Food and Nutrient Adminstration: diet order, enteral and parenteral nutrition administration  Anthropometric Measurements: weight, weight change  Biochemical Data, Medical Tests and Procedures: electrolyte and renal panel, gastrointestinal profile, inflammatory profile  Nutrition-Focused Physical Findings: overall appearance     Nutrition Follow-Up  RD Follow-up?: Yes

## 2019-09-23 NOTE — PT/OT/SLP DISCHARGE
Physical Therapy Discharge Summary    Name: Zaira Gupta  MRN: [id number]   Principal Problem: Small bowel perforation     Patient Discharged from acute Physical Therapy on 2019.  Please refer to prior PT noted date on 2019 for functional status.     Assessment:     Pt transferred to ICU 2/2 increasing pressor requirements.     Objective:     GOALS:   Multidisciplinary Problems     Physical Therapy Goals        Problem: Physical Therapy Goal    Goal Priority Disciplines Outcome Goal Variances Interventions   Physical Therapy Goal     PT, PT/OT Ongoing (interventions implemented as appropriate)     Description:  Goals to be met by: 2019    Patient will increase functional independence with mobility by performin. Supine <> sit with Supervision.  2. Sit <> stand transfer with Supervision using No Assistive Device and Rolling Walker.  3. Bed <> chair transfer via Stand Pivot with Supervision using No Assistive Device and Rolling Walker.  4. Gait  x 150 feet with Stand-by Assistance using No Assistive Device and Rolling Walker to prepare for community ambulation and endurance activities.  5. Able to tolerate exercise for 15-20 reps with independence.                        Reasons for Discontinuation of Therapy Services  Transfer to ICU       Plan:     Patient Discharged to: ICU.    Cyn Palomo, PT  2019

## 2019-09-23 NOTE — TREATMENT PLAN
EGD done today    Impression:           - Non-bleeding erosive gastropathy.                        - One large non-bleeding duodenal ulcer with no                         stigmata of bleeding, biopsied. Treated with                         bipolar cautery post biopsy.                        - No active bleeding noted on this exam.  Recommendation:  -Consider CTA if the bleeding continues.  -Continue IV PPI BID  -Discontinue octreotide. Continue IV Abx for 5 days.      Will continue to follow

## 2019-09-23 NOTE — PLAN OF CARE
POC reviewed with Pt. Pt is awake and alert. Pt is in and out of sleep. Pt follow commands. Pts O2 sats been >95% on 4L NC. Pts HR has been 100s. Pt received 2 units PRBCs and 2L of crystalloid. MAP>65. Levo titrated for MAP goals. Weinberg placed with U/O  of 30-60cc/hr. Pt complains of whole body hurting. Pt had X2 Bloody BMs on my shift. Recollect CBC just drawn and sent. Wound vac intact with small amount of SS drainage. Heel boots on. VSS .Will continue to monitor.

## 2019-09-23 NOTE — SUBJECTIVE & OBJECTIVE
Past Medical History:   Diagnosis Date    Acid reflux     Anemia     Arthritis     Autoimmune hepatitis 10/23/2012      Ref. Range 2012 09:38  BRANT Latest Range: Neg <1:160  Pos, Titer to follow (A)  BRANT HEP-2 Titer Latest Range: Neg <1:160 titer Pos 1:320 (A)  BRANT Hep-2 Pattern No range found Homogeneous (A)  Anti-SSA Antibody Latest Range: <20 EU 1.68  Anti-SSA Interpretation Latest Range: Negative  Negative  Anti-SSB Antibody Latest Range: <20 EU 1.76  Anti-SSB Interpretation Latest Range: Negative  Negativ    Cirrhosis     Dry mouth     Esophageal varix bleeding     Hypothyroidism     Obesity     Thrombocytopenia        Past Surgical History:   Procedure Laterality Date    BLADDER SURGERY  in      SECTION, CLASSIC      DILATION AND CURETTAGE OF UTERUS   MAB    EGD (ESOPHAGOGASTRODUODENOSCOPY) N/A 11/15/2013    Performed by Shaun Grossman MD at [facility] ENDO (2ND FLR)    ESOPHAGOGASTRODUODENOSCOPY (EGD) N/A 2015    Performed by Luis [facility]ran[facility]Reyes, [facility] ENDO    LAPAROTOMY, EXPLORATORY N/A 2019    Performed by Zay S[facility]chez MD at [facility] OR 2ND FLR    TIPS PROCEDURE      TIPS revision  2012    TONSILLECTOMY, ADENOIDECTOMY         Review of patient's allergies indicates:   Allergen Reactions    Aspirin (bulk) Other (See Comments)    Heparin analogues Other (See Comments)     Difficulty to arouse    Nsaids (non-steroidal anti-inflammatory drug)      Family History     Problem Relation (Age of Onset)    Arthritis Mother    Diabetes Mother, Sister, Sister    Hypertension Sister        Tobacco Use    Smoking status: Former Smoker     Packs/day: 0.10     Types: Cigarettes    Smokeless tobacco: Never Used   Substance and Sexual Activity    Alcohol use: No     Alcohol/week: 0.0 standard drinks    Drug use: No    Sexual activity: Never     Review of Systems   Constitutional: Positive for activity change and appetite change. Negative for chills  and fever.   Respiratory: Negative for shortness of breath.    Cardiovascular: Negative for chest pain.   Gastrointestinal: Positive for blood in stool. Negative for abdominal pain (No abd pain at rest), constipation, nausea and vomiting.   Skin: Positive for wound (wound vac at site of incision).   Neurological: Negative for dizziness and light-headedness.     Objective:     Vital Signs (Most Recent):  Temp: 98.2 °F (36.8 °C) (09/23/19 0818)  Pulse: 99 (09/23/19 0900)  Resp: 18 (09/23/19 0900)  BP: (!) 100/56 (09/23/19 0900)  SpO2: 95 % (09/23/19 0900) Vital Signs (24h Range):  Temp:  [96.3 °F (35.7 °C)-98.4 °F (36.9 °C)] 98.2 °F (36.8 °C)  Pulse:  [] 99  Resp:  [15-29] 18  SpO2:  [92 %-100 %] 95 %  BP: ()/(41-62) 100/56     Weight: 134.3 kg (296 lb 1.2 oz) (09/23/19 0042)  Body mass index is 49.27 kg/m².      Intake/Output Summary (Last 24 hours) at 9/23/2019 0916  Last data filed at 9/23/2019 0900  Gross per 24 hour   Intake 4425.42 ml   Output 430 ml   Net 3995.42 ml       Lines/Drains/Airways     Central Venous Catheter Line                 Percutaneous Central Line Insertion/Assessment - triple lumen  09/23/19 0300 right internal jugular less than 1 day          Drain                 Urethral Catheter 09/23/19 0130 Double-lumen less than 1 day          Peripheral Intravenous Line                 Midline Catheter Insertion/Assessment  - Single Lumen 09/16/19 1032 Right brachial vein 18g x 10cm 6 days                Physical Exam   Constitutional: She is oriented to person, place, and time. No distress.   HENT:   Head: Normocephalic and atraumatic.   Eyes: EOM are normal.   Cardiovascular: Normal rate and regular rhythm.   Pulmonary/Chest: Effort normal and breath sounds normal. No respiratory distress.   Abdominal: Soft. Bowel sounds are normal. She exhibits distension (distension 2/2 excess adipose tissue). There is tenderness (tenderness to deep palpation in LUQ and LLQ). There is no rebound and  no guarding.   Musculoskeletal: She exhibits edema (edema in b/l UEs and pitting edema to knee in LLE).   Superficial hematoma noted on dorsal surface of R hand     Neurological: She is alert and oriented to person, place, and time.   Skin: Skin is warm. She is not diaphoretic.       Significant Labs:  CBC:   Recent Labs   Lab 09/22/19  2343 09/23/19  0400 09/23/19  0603   WBC 8.09 15.33* 9.27   HGB 5.7* 8.5* 7.7*   HCT 19.4* 27.8* 25.2*   PLT 46* 52* 42*     CMP:   Recent Labs   Lab 09/23/19  0400      CALCIUM 8.1*   ALBUMIN 1.4*   PROT 4.3*      K 4.5   CO2 21*      BUN 44*   CREATININE 1.1   ALKPHOS 81   ALT 30   AST 85*   BILITOT 5.5*     Coagulation:   Recent Labs   Lab 09/23/19  0400   INR 2.5*   APTT 46.3*     Liver Function Test:   Recent Labs   Lab 09/21/19  1808 09/22/19  0417 09/23/19  0400   ALT 37 35 30   * 95* 85*   ALKPHOS 89 91 81   BILITOT 4.8* 4.8* 5.5*   PROT 4.6* 4.6* 4.3*   ALBUMIN 1.6* 1.5* 1.4*       Significant Imaging:  N/A

## 2019-09-23 NOTE — PLAN OF CARE
Recommendations  Recommendation/Intervention:   1. As medically able, ADAT to Low Fiber/Residue with texture per SLP.   2. If unable to advance diet and TPN warranted, recommend Custom TPN 135g AA and 215g Dextrose + IV lipids daily - to provide 1771 kcal/day and 135g protein/day.   RD to monitor.    Goals: Patient to receive nutrition by RD follow-up  Nutrition Goal Status: new    Full assessment completed, see RD Note 9/23/2019.

## 2019-09-23 NOTE — PT/OT/SLP DISCHARGE
Occupational Therapy Discharge Summary    Zaira Gupta  MRN: [id number]   Principal Problem: Small bowel perforation      Patient Discharged from acute Occupational Therapy on 9/23/19.  Please refer to prior OT note dated 9/20/19 for functional status.    Assessment:      Patient was discharged unexpectedly.  Information required to complete an accurate discharge summary is unknown.  Refer to therapy initial evaluation and last progress note for initial and most recent functional status and goal achievement.  Recommendations made may be found in medical record.    Objective:     GOALS:   Multidisciplinary Problems     Occupational Therapy Goals        Problem: Occupational Therapy Goal    Goal Priority Disciplines Outcome Interventions   Occupational Therapy Goal     OT, PT/OT Ongoing (interventions implemented as appropriate)    Description:  Goals to be met by: 10/1/19     Patient will increase functional independence with ADLs by performing:    UE Dressing with [place].  LE Dressing with Modified [place] and Assistive Devices as needed.  Grooming while standing at sink with Modified [place].  Toileting from toilet with Modified [place] for hygiene and clothing management.   Bathing from  edge of bed with Modified [place].  Toilet transfer to toilet with Modified [place].  Increased functional strength to WFL for B UE.  Upper extremity exercise program x15 reps per handout, with independence.                      Reasons for Discontinuation of Therapy Services  Transfer to alternate level of care.      Plan:     Patient Discharged to: ICU 2* GIB.  Please re-consult when pt is medically stable.    FELICITA Cody  9/23/2019

## 2019-09-23 NOTE — HPI
The patient is a 59 yo F with PMH autoimmune hepatitis vs NORIEGA cirrhosis, hx of portal HTN and varices s/p TIPS at [facility] in 2012, CKD, and morbid obesity who is being evaluated for a large bloody bowel movement on wards with concomitant dyspnea, tachycardia, and significant drop in H/H. She has transferred to SICU early AM 09/23 and received 2 U PRBCs, 1 L LR, 1 L NS, started levo, and has since had 2 more bloody BMs described as mostly dark blood with some bright red blood mixed in. The patient is currently alert and oriented and denies any dyspnea, dizziness, or light-headedness. She denies previous h/o bloody BMs or hematemesis. The patient is currently NPO and has been receiving rectal lactulose. INR is currently 2.5 (2.9 on 09/21), 1.4 at admission.  The patient was initially transferred from [facility] on 09/13 for emergent ex-lap 2/2 free air noted on imaging in the setting of severe abdominal pain, nausea, and vomiting. She was found have 2cm perforation in the small bowel which was resected and anastamosed here at [facility] 09/13.

## 2019-09-23 NOTE — PT/OT/SLP PROGRESS
Speech Language Pathology      Zaira Gupta  MRN: [id number]    ST orders d/c'd 2' to patient transfer to ICU. Please re-consult ST when indicated.     Clem Whyte CCC-SLP  Speech-Language Pathology  Pager: [phone]

## 2019-09-23 NOTE — PROGRESS NOTES
Pt arrived to SICU [id number]. Pt connected to continuous monitoring and 2L NC. SICU MDs at bedside assessing Pt. NS bolus given and Levo started. Awaiting on PRBCs to arrive. VSS. Will continue to monitor.

## 2019-09-23 NOTE — ADDENDUM NOTE
Addendum  created 09/23/19 0741 by Ashok Cox MD    Attestation recorded in Intraprocedure, Intraprocedure Attestations filed

## 2019-09-24 NOTE — CONSULTS
"Consult Note    Consults  SUBJECTIVE:     History of Present Illness:  Zaira Gupta is a 58-yo female with a past medical history of obesity, autoimmune hepatitis, cirrhosis, hypothyroidism, CKD stage III, recent TIPS procedure, who is in the SICU s/p ex-lap for gastric perforation. Hematology consulted for new diagnosis of DLBCL.    History obtained from chart review. Patient intubated with no family at bedside at time of exam. [place] [facility] higher level of care. Underwent emergent laparotomy on 9/13/19. Hospital course complicated by GIB, s/p EGD on 9/23/19 revealing one large non-bleeding duodenal ulcer, and erosive gastropathy. Patient with worsening mentation today, and was intubated for airway protection. Patient currently on ceftriaxone.    Small bowel excision results from 9/13/19 returned today revealing "Diffuse large B-cell lymphoma, germinal center origin by Hieu algorithm, high-grade by elevated Ki-67 proliferation index, pending molecular studies for further classification. Grossly perforated bowel with acute serositis and serosal adhesions COMMENT: By immunohistochemical stains this is a germinal center origin large B-cell lymphoma (CD10 less than 30%, BCL6 diffusely positive, MUM1 negative). By definition this cannot be a double expressor because BCL2 highlights only a subset of cells estimated at 25% of cellularity. A block will be sent for molecular markers including c-myc for further classification."    Review of patient's allergies indicates:   Allergen Reactions    Aspirin (bulk) Other (See Comments)    Heparin analogues Other (See Comments)     Difficulty to arouse    Nsaids (non-steroidal anti-inflammatory drug)      Past Medical History:   Diagnosis Date    Acid reflux     Anemia     Arthritis     Autoimmune hepatitis 10/23/2012      Ref. Range 2/6/2012 09:38  BRANT Latest Range: Neg <1:160  Pos, Titer to follow (A)  BRANT HEP-2 Titer Latest " Range: Neg <1:160 titer Pos 1:320 (A)  BRANT Hep-2 Pattern No range found Homogeneous (A)  Anti-SSA Antibody Latest Range: <20 EU 1.68  Anti-SSA Interpretation Latest Range: Negative  Negative  Anti-SSB Antibody Latest Range: <20 EU 1.76  Anti-SSB Interpretation Latest Range: Negative  Negativ    Cirrhosis     Dry mouth     Esophageal varix bleeding     Hypothyroidism     Obesity     Thrombocytopenia      Past Surgical History:   Procedure Laterality Date    BLADDER SURGERY  in      SECTION, CLASSIC      DILATION AND CURETTAGE OF UTERUS   MAB    ESOPHAGOGASTRODUODENOSCOPY N/A 2019    Procedure: EGD (ESOPHAGOGASTRODUODENOSCOPY);  Surgeon: Silverio Tran MD;  Location: [facility] ([address] [place] [address]);  Service: Endoscopy;  Laterality: N/A;    TIPS PROCEDURE      TIPS revision  2012    TONSILLECTOMY, ADENOIDECTOMY       Family History   Problem Relation Age of Onset    Arthritis Mother     Diabetes Mother     Diabetes Sister     Diabetes Sister     Hypertension Sister     Breast cancer Neg Hx     Ovarian cancer Neg Hx      Social History     Tobacco Use    Smoking status: Former Smoker     Packs/day: 0.10     Types: Cigarettes    Smokeless tobacco: Never Used   Substance Use Topics    Alcohol use: No     Alcohol/week: 0.0 standard drinks    Drug use: No     Review of Systems   Unable to perform ROS: Intubated     OBJECTIVE:     Vital Signs:  Temp:  [97.5 °F (36.4 °C)-97.8 °F (36.6 °C)]   Pulse:  []   Resp:  [16-80]   BP: ()/(44-62)   SpO2:  [92 %-99 %]     Physical Exam   Constitutional: She appears well-developed and well-nourished.   HENT:   ETT in place   Eyes: EOM are normal.   Neck: Normal range of motion. Thyromegaly present.   Cardiovascular: Normal rate and regular rhythm.   Pulmonary/Chest: Effort normal. No respiratory distress.   Abdominal: Soft. She exhibits no distension. There is no tenderness.   Abdominal wound vac   Musculoskeletal: She exhibits  edema.   Neurological:   Does not respond to voice, sedated   Skin: Skin is warm and dry.     Laboratory:  CBC:   Recent Labs   Lab 09/24/19  0827   WBC 9.94   RBC 2.27*   HGB 7.6*   HCT 25.0*   PLT 60*   *   MCH 33.5*   MCHC 30.4*     CMP:   Recent Labs   Lab 09/24/19  0228      CALCIUM 8.4*   ALBUMIN 1.6*   PROT 4.5*      K 4.6   CO2 20*   *   BUN 62*   CREATININE 1.6*   ALKPHOS 82   ALT 32   AST 77*   BILITOT 5.8*     Diagnostic Results:  Narrative     EXAMINATION:  CT ABDOMEN PELVIS WITH CONTRAST    CLINICAL HISTORY:  Abdominal pain, unspecified;    TECHNIQUE:  Low dose axial images, sagittal and coronal reformations were obtained from the lung bases to the pubic symphysis following the IV administration of 80 mL of Omnipaque 350    COMPARISON:  04/01/2019    FINDINGS:  CT scan abdomen:    Scans through lung bases show normal cardiac size.  Multiple noncalcified pulmonary nodules largest in left lung base measuring 12-15 mm are noted.  There are no pleural effusions.    Examination of the upper abdomen shows a small cirrhotic appearing liver, the patient is post TIPS, the gallbladder is mildly dilated containing multiple gallstones there is no biliary dilatation.  A small amount of ascites is present as well as free intraperitoneal air..  Enlarged pericardial phrenic lymph nodes are seen and large gastric varices noted.    The portal vein is patent there is moderate splenomegaly and associated splenic varices.    Both kidneys are present, small in size without evidence of hydronephrosis or renal calculi.    CT scan pelvis    Delayed scans through the entirety of abdomen and pelvis show a moderate amount of free intraperitoneal air      Impression       1.  Changes of cirrhosis and portal hypertension with small amount of peritoneal fluid and moderate amount of intraperitoneal air raising possibility of recent paracentesis.    2.  Multiple noncalcified pulmonary nodules in both lung  "bases of unknown clinical significance.         ASSESSMENT/PLAN:   Impression  1) Stage IV DLBCL  -Available imaging to date: CTA chest 4/1/19 showed pulmonary nodules ~1cm. CT A/P on 9/12/19 showed perinephric LN, moderate splenomegaly, small cirrhotic liver  -Small bowel excision results from 9/13/19 returned today revealing "Diffuse large B-cell lymphoma, germinal center origin by Hieu algorithm, high-grade by elevated Ki-67 proliferation index, pending molecular studies for further classification. Grossly perforated bowel with acute serositis and serosal adhesions COMMENT: By immunohistochemical stains this is a germinal center origin large B-cell lymphoma (CD10 less than 30%, BCL6 diffusely positive, MUM1 negative). By definition this cannot be a double expressor because BCL2 highlights only a subset of cells estimated at 25% of cellularity. A block will be sent for molecular markers including c-myc for further classification."    2) Transaminitis in setting of history of autoimmune hepatitis and cirrhosis  -Tbili 5.8 today    3) Coagulopathy due to liver dysfunction vs DIC    4) GENIE  -Cr 1.6    5) Thrombocytopenia, anemia, possibly d/t lymphoma, vs related to medications, blood loss, critical illness    6) AMS with concern for airway protection  -Patient intubated on 9/24    Recommendations  -Obtain daily tumor lysis labs: uric acid, LDH, Mg, PO4, BMP  -Obtain CT neck/C/A/P without contrast if kidney function does not permit contrast  -Start allopurinol for TLS prophylaxis  -Await c-myc results  -Once have above results, will discuss recommendations regarding appropriateness of inpatient chemotherapy, in light of renal and liver dysfunction, which are currently prohibitive for certain regimens  -Transfuse for fibrinogen<100, Hb<7, plt<10 or active bleeding    The following was staffed and discussed with supervising physician Dr. Camacho. Please contact BMT Fellow with any questions.    Sarai Hinkle MD " PGY-V  Hematology/Oncology Fellow

## 2019-09-24 NOTE — PHYSICIAN QUERY
"PT Name: Zaira Gupta  MR #: [id number]    Physician Query Form - Hematology Clarification      CDS/: Ngoc Parkinson               Contact information: [email or web address]    This form is a permanent document in the medical record.      Query Date: September 24, 2019    By submitting this query, we are merely seeking further clarification of documentation. Please utilize your independent clinical judgment when addressing the question(s) below.    The Medical record contains the following:   Indicators  Supporting Clinical Findings Location in Medical Record    "Anemia" documented     x H & H = Hgb= 7.8 - 9.2 - 7.9 - 9.2 - 7.3 - 5.7 - 8.5 - 7.7 - 7.4 - 7.7 - 7.5 - 7.2 - 7.6  Hct= 26.6 - 30.1 - 26.1 - 29.5 - 23.5 - 18.5 - 19.4 - 27.8 - 25.2 - 24.1 - 23.3 - 23.7 - 24.1 - 23.2 - 25.0 Labs, CBC 09/18 - 09/24    BP =                     HR=     x "GI bleeding" documented Pt had very large, bloody BM    New GI bleed    Large, bloody BM with dyspnea, tachycardia, and significant drop in Hgb    1 Large dark red bloody BM during shift.  1 lactulose enema given, was not retained, a large amount of dark red bloody was immediately returned.    Acute GI bleeding  GI scoped pt, duodenal ulcer seen, bx sent   Nurse's PN 09/22    General Surgery PN 09/23    GI Consult 09/23      Nurse's Plan of Care 09/23        General Surgery PN 09/24    Acute bleeding (Non GI site)     x Transfusion(s) Patient was transfused Lactated ringers and PRBCs      CCS H&P 09/23, filed 09/24   x Treatment: H/H trending   Transfuse units of blood as needed for GI bleed    Transferred to SICU o/n after having several bloody BMs, drop in H/H, and tachycardia.  Received 2u PRBC  Q4h CBC  GI consult for scope  Start Protonix for GI bleed   CCS H&P 09/23, filed 09/24      General Surgery PN 09/23   x Other:  Secondary to bleeding is also having hypotension and need for pressors.    Hx of autoimmune hepatitis vs NORIEGA cirrhosis, hx of gastric " variceal bleed s/p TIPS at [facility] in 2012, hypothyroidism, CKD, and morbid obesity who was [place] [facility] [place] found to have perforated viscous. She is s/p ex-lap with resection of 2cm perforation in small bowel. CCS H&P 09/23, filed 09/24      Hepatology Consult 09/21, filed 09/22     Provider, please specify diagnosis or diagnoses associated with above clinical findings.    [ x ] Acute blood loss anemia     [  ] Anemia of chronic disease ( Specify chronic disease)       [  ] CKD (specify stage):   [  ] Other (Specify):   [  ] Clinically Undetermined     [  ] Other Hematological Diagnosis (please specify):     [  ] Clinically Undetermined       Please document in your progress notes daily for the duration of treatment, until resolved, and include in your discharge summary.

## 2019-09-24 NOTE — HPI
Ms. Gupta is a 57 y/o lady with a known case of autoimmune hepatitis/chirrosis with splenomegaly, Portal HTN (S/P TIPS on 202), Reflex esophagitis, CKD 3 and hypothyroidism. Pt was previously stepped down from SICU  After she has EX-LAP for bowel perforation on 9/13/2019. She got stepped up from the floor due to blood/melena per rectum

## 2019-09-24 NOTE — PROGRESS NOTES
Patient intubated per anesthesia.  Patient tolerated without difficulty.  Refer to flow sheet for for vital signs and assessments.  Will continue to monitor.

## 2019-09-24 NOTE — ASSESSMENT & PLAN NOTE
58F s/p 9/13 Ex-Lap and small bowel resection. Transferred to SICU early AM 9/23 for new GI bleed.    - Appreciate SICU assistance  - NPO, mIVF  - jeb for strict I/Os  - q4h CBC  - RUQ U/S to eval TIPS  - prn pain/nausea meds  - Start Protonix for GI bleed  - PT/OT rec, HH

## 2019-09-24 NOTE — PROGRESS NOTES
[facility]  Critical Care - Surgery  Progress Note    Patient Name: Zaira Gupta  MRN: [id number]  Admission Date: 9/13/2019  Hospital Length of Stay: 11 days  Code Status: Full Code  Attending Provider: Zay Sánchez MD  Primary Care Provider: Minna Canchola NP   Principal Problem: Small bowel perforation    Subjective:     Hospital/ICU Course:  9/13: Admit s/p small bowel resection for small bowel perforation. Extubated, HDS, not on pressors.   9/15: Started on CLD without issues. Passing flatus.   9/16: stable for step down. PICC line placed, patient lost IV access.  9/17: stable for step down. Lost PICC line overnight. New consult placed.  - 9/23 Pt was stepped up from the floor due to bleeding per rectum (melena/bright red). She is stable, her mental status got worse during the afternoon. Started on lactulose for high ammonia. EGD was done showed not active duodenal ulcer, and major source of bleeding was found. Placed on protonix/octerotide gtt and ceftriaxone (for SBP prophylaxis)   - 9/24 During day time Pt GCS 8 or below but she was satting well on NC. She got intubated for airway protection, and rectal tube placement     Interval History/Significant Events: No acute events overnight. Her mentation got worse during the morning time, for airway protection >> she got intubated and rectal tube has been placed     Follow-up For: Procedure(s) (LRB):  EGD (ESOPHAGOGASTRODUODENOSCOPY) (N/A)      Objective:     Vital Signs (Most Recent):  Temp: 97.6 °F (36.4 °C) (09/24/19 0700)  Pulse: 96 (09/24/19 0946)  Resp: (!) 21 (09/24/19 0946)  BP: (!) 89/52 (09/24/19 0946)  SpO2: 99 % (09/24/19 0946) Vital Signs (24h Range):  Temp:  [97.3 °F (36.3 °C)-98.4 °F (36.9 °C)] 97.6 °F (36.4 °C)  Pulse:  [] 96  Resp:  [16-80] 21  SpO2:  [85 %-100 %] 99 %  BP: ()/(43-63) 89/52     Weight: 134.3 kg (296 lb 1.2 oz)  Body mass index is 49.27 kg/m².      Intake/Output Summary (Last 24 hours) at  9/24/2019 1007  Last data filed at 9/24/2019 0900  Gross per 24 hour   Intake 1228.56 ml   Output 965 ml   Net 263.56 ml       Physical Exam   Constitutional: She appears well-developed and well-nourished. No distress.   Eyes: Conjunctivae are normal. Right eye exhibits no discharge. Left eye exhibits no discharge. No scleral icterus.   Cardiovascular: Normal rate and regular rhythm. Exam reveals no friction rub.   No murmur heard.  Pulmonary/Chest: Effort normal. No stridor. No respiratory distress. She has no wheezes. She has rales.   Intubated    Abdominal: Soft. Bowel sounds are normal. She exhibits no distension. There is no tenderness. There is no guarding.   Musculoskeletal: She exhibits no edema, tenderness or deformity.   Neurological:   On sedation    Skin: She is not diaphoretic.       Vents:  Vent Mode: A/C (09/24/19 0946)  Set Rate: 16 bmp (09/24/19 0946)  Vt Set: 400 mL (09/24/19 0946)  Pressure Support: 0 cmH20 (09/24/19 0946)  PEEP/CPAP: 8 cmH20 (09/24/19 0946)  Oxygen Concentration (%): 40 (09/24/19 0946)  Peak Airway Pressure: 18 cmH2O (09/24/19 0946)  Plateau Pressure: 0 cmH20 (09/24/19 0946)  Total Ve: 8.49 mL (09/24/19 0946)  F/VT Ratio<105 (RSBI): (!) 45.06 (09/24/19 0946)    Lines/Drains/Airways     Central Venous Catheter Line                 Percutaneous Central Line Insertion/Assessment - triple lumen  09/23/19 0300 right internal jugular 1 day          Drain                 Urethral Catheter 09/23/19 0130 Double-lumen 1 day         Rectal Tube 09/24/19 0700 fecal management system less than 1 day          Airway                 Airway - Non-Surgical 09/24/19 0739 Endotracheal Tube less than 1 day          Peripheral Intravenous Line                 Midline Catheter Insertion/Assessment  - Single Lumen 09/16/19 1032 Right brachial vein 18g x 10cm 7 days                Significant Labs:    CBC/Anemia Profile:  Recent Labs   Lab 09/23/19  2359 09/24/19  0228 09/24/19  0827   WBC 9.92 9.31  9.94   HGB 7.5* 7.2* 7.6*   HCT 24.1* 23.2* 25.0*   PLT 50* 53* 60*   * 111* 110*   RDW 23.8* 23.8* 24.1*        Chemistries:  Recent Labs   Lab 09/23/19  0400 09/23/19  1740 09/24/19  0228    144 145   K 4.5 4.6 4.6    111* 111*   CO2 21* 21* 20*   BUN 44* 53* 62*   CREATININE 1.1 1.4 1.6*   CALCIUM 8.1* 8.4* 8.4*   ALBUMIN 1.4* 1.6* 1.6*   PROT 4.3* 4.4* 4.5*   BILITOT 5.5* 6.0* 5.8*   ALKPHOS 81 77 82   ALT 30 31 32   AST 85* 79* 77*   MG 1.9  --  2.0   PHOS 3.4  --  4.9*       All pertinent labs within the past 24 hours have been reviewed.    Significant Imaging:  I have reviewed all pertinent imaging results/findings within the past 24 hours.    Assessment/Plan:     * Small bowel perforation  Ms. Gupta is a 59 y/o lady with a known case of Alcohol induced hepatitis/chirrosis with splenomegaly, Portal HTN (S/P TIPS on 202), Reflex esophagitis, CKD 3 and hypothyroidism. Pt was previously stepped down from SICU  After she has EX-LAP for bowel perforation on 9/13/2019. She got stepped up from the floor due to blood/melena per rectum     Neuro:   - Sedation: Propofol   - Since 9/23/2019 17:00 Pt got more confused, and on 9/24 she wasn't following commands at all >> got intubated for airway protection   - Etiology of AMS most likely due to high ammonia levels (hepatic encephalopathy)     Plan:  - Lactulose via rectal tube and NGT for high ammonia        Pulmonary:   - Currently intubated on 9/24 for airway protection due to low GCS    Vent Mode: A/C  Oxygen Concentration (%):  [40] 40  Resp Rate Total:  [16 br/min-23 br/min] 23 br/min  Vt Set:  [400 mL] 400 mL  PEEP/CPAP:  [8 cmH20] 8 cmH20  Pressure Support:  [0 cmH20] 0 cmH20  Mean Airway Pressure:  [9.3 smN23-07 cmH20] 11 cmH20      Plan:  - Wean ventilation parameters as tolerated         Cardiac:   - @ home he takes spironolactone 100 mg, and lasix 40 mg   - Hemodynamic stable  - Not on pressors nor drips   - No ECHO on chart         Renal:  Known case of CKD 3  - Weinberg in place  - Monitor UOP  - F/U BUN and CR         Fluids/Electrolytes/Nutrition/GI: Alcohol induced hepatitis/chirrosis with splenomegaly, Portal HTN (S/P TIPS on 202), Reflex esophagitis. S/P Bowel perforation and underwent EX LAP on 9/13   - We will place NGT  - Rectal tube placed on 9/24   - Previously on Protonix and octreotide gtt. Currently on pantoprazole 40  mg BID  - On lactulose 200 g TID   - Nutritional status: NPO  - replace lytes PRN     Plan:  - Trend ammonia levels   - We will start PO lactulose  >> goal 3 - 4 bowel movement per day        Hematology/Oncology:  - H/H has been stable since she received 2 U PRBC on 9/23. Received cryoprecipitate 1 U on 9/24 for low fibrinogen   - CBC q6H  - INR 1.9, PTT 43 and recent fibrinogen (post 1 u) is 101      Plan:  - F/U CBC        Infectious Disease:   - Afebrile  - WBC  WNL  - Currently on ceftriaxone 2g q24h for SBP prophylaxis         Endocrine: Hypothyroidism   - Glucose goal of 140 - 180 u  - SSI           Shiva Salinas MD  Critical Care - Surgery  [facility]

## 2019-09-24 NOTE — ASSESSMENT & PLAN NOTE
Ms. Gupta is a 59 y/o lady with a known case of Alcohol induced hepatitis/chirrosis with splenomegaly, Portal HTN (S/P TIPS on 202), Reflex esophagitis, CKD 3 and hypothyroidism. Pt was previously stepped down from SICU  After she has EX-LAP for bowel perforation on 9/13/2019. She got stepped up from the floor due to blood/melena per rectum     Neuro:   - Sedation: Propofol   - Since 9/23/2019 17:00 Pt got more confused, and on 9/24 she wasn't following commands at all >> got intubated for airway protection   - Etiology of AMS most likely due to high ammonia levels (hepatic encephalopathy)     Plan:  - Lactulose via rectal tube and NGT for high ammonia        Pulmonary:   - Currently intubated on 9/24 for airway protection due to low GCS    Vent Mode: A/C  Oxygen Concentration (%):  [40] 40  Resp Rate Total:  [16 br/min-23 br/min] 23 br/min  Vt Set:  [400 mL] 400 mL  PEEP/CPAP:  [8 cmH20] 8 cmH20  Pressure Support:  [0 cmH20] 0 cmH20  Mean Airway Pressure:  [9.3 gkZ39-94 cmH20] 11 cmH20      Plan:  - Wean ventilation parameters as tolerated         Cardiac:   - @ home he takes spironolactone 100 mg, and lasix 40 mg   - Hemodynamic stable  - Not on pressors nor drips   - No ECHO on chart         Renal: Known case of CKD 3  - Weinberg in place  - Monitor UOP  - F/U BUN and CR         Fluids/Electrolytes/Nutrition/GI: Alcohol induced hepatitis/chirrosis with splenomegaly, Portal HTN (S/P TIPS on 202), Reflex esophagitis. S/P Bowel perforation and underwent EX LAP on 9/13   - We will place NGT  - Rectal tube placed on 9/24   - Previously on Protonix and octreotide gtt. Currently on pantoprazole 40  mg BID  - On lactulose 200 g TID   - Nutritional status: NPO  - replace lytes PRN     Plan:  - Trend ammonia levels   - We will start PO lactulose        Hematology/Oncology:  - H/H has been stable since she received 2 U PRBC on 9/23. Received cryoprecipitate 1 U on 9/24 for low fibrinogen   - CBC q6H  - INR 1.9, PTT 43 and  recent fibrinogen (post 1 u) is 101      Plan:  - F/U CBC        Infectious Disease:   - Afebrile  - WBC  WNL  - Currently on ceftriaxone 2g q24h for SBP prophylaxis         Endocrine: Hypothyroidism   - Glucose goal of 140 - 180 u  - SSI

## 2019-09-24 NOTE — CARE UPDATE
Called to bedside for dark red BMx 2 since first lactulose enema this evening. H/H stable 7.7 (7.4, 7.4), fibrinogen decreased from 90 to <70. PTT 41.7 (40.7). Serum ammonia this morning 117 (45), will trend. Patient is sleeping at bedside, sleepy and minimally following commands. Bloody BM visualized, dark brown with no gross blood or clots.    Giving 1 unit cryoprecipitate. Continuing to monitor trend CBCs, fibrinogen.     -Geri Samuels M.D  General Surgery PGY1

## 2019-09-24 NOTE — CONSULTS
Please see Consult Note dated 9/24/19 for full details.    Sarai Hinkle MD PGY-V  Hematology/Oncology Fellow

## 2019-09-24 NOTE — ASSESSMENT & PLAN NOTE
NGT once ETT placed, start lactulose per NGT, titrate to 3x bowel movements daily  Recent Labs     09/24/19  0228   AMMONIA 124*

## 2019-09-24 NOTE — PHYSICIAN QUERY
PT Name: Zaira Gupta  MR #: [id number]     PHYSICIAN QUERY -  ACUITY OF CONDITION CLARIFICATION      CDS/: Ngoc Parkinson               Contact information: [email or web address]  This form is a permanent document in the medical record.     Query Date: September 24, 2019    By submitting this query, we are merely seeking further clarification of documentation to reflect the severity of illness of your patient. Please utilize your independent clinical judgment when addressing the question(s) below.    The Medical record reflects the following:     Indicators   Supporting Clinical Findings Location in Medical Record   x Documentation of condition Patient with evidence of hepatic encephalopathy      Etiology of AMS most likely due to high ammonia levels (hepatic encephalopathy)  Hepatology Consult 09/21, filed 09/22    CCS PN 09/24   x Lab Value(s) Serum ammonia this morning 117 (45), will trend.    Ammonia= 99 - 40 - 40 - 70 - 67 - 90 - 76 - 45 - 117 - 124 [facility] Care Update 09/23    Labs, Chem Profile 09/14 - 09/16 - 09/18 - 09/19 - 09/20 - 09/21 - 09/22 0417 - 09/22 2343 - 09/23 - 09/24    Radiology Findings     x Treatment                                 Medication Tid lactulose per rectum  Follow up am labs, including ammonia levels    There are no IV medications to treat hepatic encephalopathy as the mechanism of action needs to work directly in the gut by PO.  Can place NG tube and administer medications  Rifaximin 550mg PO BID  Lactulose PO titrate to 3-5 bowel movements daily    Lactulose via rectal tube and NGT for high ammonia     Intubation  Post-operative diagnosis: Encephalopathy  General Surgery PN 09/20      Hepatology Treatment Plan 09/22            CCS PN 09/24      CCS Procedure Note 09/24   x Other Is able to answer orientation questions (oriented to person, place, time), but confused in conversation.    Hepatology consulted for decompensated cirrhosis after surgery  Slow to answer  questions but oriented x3  +asterixis    Sleepy and minimally following commands    Since 9/23/2019 17:00 Pt got more confused, and on 9/24 she wasn't following commands at all    Acute confusional state  NGT once ETT placed, start lactulose per NGT, titrate to 3x bowel movements daily   General Surgery PN 09/20      Hepatology Consult 09/21, filed 09/22        CCS Care Update 09/23    CCS PN 09/24      General Surgery PN 09/24     Provider, please specify the acuity/chronicity of __Hepatic Encephalopathy___:    [x   ] Acute   [   ] Chronic   [   ] Acute and/on chronic   [   ] Ruled Out   [   ]  Clinically Undetermined       Please document in your progress notes daily for the duration of treatment until resolved, and include in your discharge summary.

## 2019-09-24 NOTE — PLAN OF CARE
POC reviewed with Pt. Pt has been somnolent on my shift. MD notified of Possible intubated due to Pt not being able to protect airway. Pts O2 sats >95% on 4L NC. Pts HR 90s-100s. MAP>65. Levo titrated for MAP goals. Lactulose enema given rectally but Pt not being able to retain enema. MD aware and a rectal tube placed this AM. VSS. Will continue to monitor.

## 2019-09-24 NOTE — PROCEDURES
"Zaira Gupta is a 58 y.o. female patient.    Temp: 97.8 °F (36.6 °C) (19 0301)  Pulse: 102 (19 07)  Resp: (!) 26 (19 07)  BP: (!) 100/54 (19 07)  SpO2: 98 % (19)  Weight: 134.3 kg (296 lb 1.2 oz) (19 0042)  Height: 5' 5" (165.1 cm) (19 0400)       Intubation  Date/Time: 2019 7:45 AM  Location procedure was performed: [facility] CRITICAL CARE SURGERY  Performed by: Shiva Salinas MD  Authorized by: Shiva Salinas MD   Assisting provider: Akash Beach MD  Pre-operative diagnosis: Encephalopathy  Post-operative diagnosis: Encephalopathy   Consent Done: Yes  Consent: Verbal consent obtained. Written consent obtained.  Consent given by: sibling  Patient understanding: patient states understanding of the procedure being performed  Procedure consent: procedure consent matches procedure scheduled  Patient identity confirmed: , MRN and name  Time out: Immediately prior to procedure a "time out" was called to verify the correct patient, procedure, equipment, support staff and site/side marked as required.  Indications: airway protection  Intubation method: video-assisted  Patient status: sedated  Preoxygenation: mask  Pretreatment medications: none  Sedatives: propofol  Paralytic: rocuronium  Laryngoscope size: Mac 3  Tube size: 7.5 mm  Tube type: cuffed  Number of attempts: 1  Cricoid pressure: no  Cords visualized: yes  Post-procedure assessment: chest rise and CO2 detector  Breath sounds: rales/crackles  ETT to lip: 22 cm  Tube secured with: ETT mcfadden  Complications: No  Specimens: No  Implants: No          Shiva Salinas  2019  "

## 2019-09-24 NOTE — PROGRESS NOTES
Dr. Samuels notified of most recent labs, assessment and VS. No new orders. Will continue to monitor.

## 2019-09-24 NOTE — PROGRESS NOTES
[facility]  General Surgery  Progress Note    Subjective:     History of Present Illness:  No notes on file    Post-Op Info:  Procedure(s) (LRB):  EGD (ESOPHAGOGASTRODUODENOSCOPY) (N/A)   1 Day Post-Op     Interval History: GCS of 7 vs. 8 this morning on rounds. Will plan for intubation.     Medications:  Continuous Infusions:   norepinephrine bitartrate-D5W Stopped (09/24/19 0502)     Scheduled Meds:   albuterol-ipratropium  3 mL Nebulization Q4H WAKE    cefTRIAXone  2 g Intravenous Q24H    lactulose  200 g Rectal TID    levothyroxine  88 mcg Intravenous Daily    pantoprazole  40 mg Intravenous BID     PRN Meds:benzocaine, Dextrose 10% Bolus, Dextrose 10% Bolus, glucagon (human recombinant), insulin aspart U-100, lidocaine, ondansetron, promethazine (PHENERGAN) IVPB, sodium chloride 0.9%     Review of patient's allergies indicates:   Allergen Reactions    Aspirin (bulk) Other (See Comments)    Heparin analogues Other (See Comments)     Difficulty to arouse    Nsaids (non-steroidal anti-inflammatory drug)      Objective:     Vital Signs (Most Recent):  Temp: 97.8 °F (36.6 °C) (09/24/19 0301)  Pulse: 101 (09/24/19 0600)  Resp: (!) 27 (09/24/19 0600)  BP: (!) 118/56 (09/24/19 0600)  SpO2: 96 % (09/24/19 0600) Vital Signs (24h Range):  Temp:  [97.3 °F (36.3 °C)-98.4 °F (36.9 °C)] 97.8 °F (36.6 °C)  Pulse:  [] 101  Resp:  [15-42] 27  SpO2:  [85 %-100 %] 96 %  BP: ()/(43-63) 118/56     Weight: 134.3 kg (296 lb 1.2 oz)  Body mass index is 49.27 kg/m².    Intake/Output - Last 3 Shifts       09/22 0700 - 09/23 0659 09/23 0700 - 09/24 0659 09/24 0700 - 09/25 0659    P.O. 0      I.V. (mL/kg) 1954.2 (14.6) 1153.6 (8.6)     Blood 430 116.3     Other       IV Piggyback 2000      Total Intake(mL/kg) 4384.2 (32.6) 1269.8 (9.5)     Urine (mL/kg/hr) 295 (0.1) 705 (0.2)     Emesis/NG output       Other 75 150     Stool 0 0     Blood       Total Output 370 855     Net +4014.2 +414.8             Urine Occurrence 1 x      Stool Occurrence 7 x 2 x           Physical Exam   Constitutional: She appears distressed.   HENT:   Head: Normocephalic and atraumatic.   Eyes:   Not opening eyes to painful stimulus   Cardiovascular: Normal rate.   Pulmonary/Chest: She is in respiratory distress. She has wheezes.   tachypneic     Abdominal: Soft. She exhibits no distension. There is no tenderness.   Neurological:   Responding minimally to sternal rub, intermittent moaning at best       Significant Labs:  CBC:   Recent Labs   Lab 09/24/19 0228   WBC 9.31   RBC 2.09*   HGB 7.2*   HCT 23.2*   PLT 53*   *   MCH 34.4*   MCHC 31.0*     CMP:   Recent Labs   Lab 09/24/19 0228      CALCIUM 8.4*   ALBUMIN 1.6*   PROT 4.5*      K 4.6   CO2 20*   *   BUN 62*   CREATININE 1.6*   ALKPHOS 82   ALT 32   AST 77*   BILITOT 5.8*     ABGs:   Recent Labs   Lab 09/23/19  1642   PH 7.359   PCO2 38.3   PO2 113*   HCO3 21.6*   POCSATURATED 98   BE -4       Significant Diagnostics:  I have reviewed all pertinent imaging results/findings within the past 24 hours.    Assessment/Plan:     * Small bowel perforation  58F s/p 9/13 Ex-Lap and small bowel resection. Transferred to SICU early AM 9/23 for new GI bleed.    - Appreciate SICU assistance  - NPO, mIVF  - russ for strict I/Os  - q4h CBC  - RUQ U/S to eval TIPS  - prn pain/nausea meds  - Start Protonix for GI bleed  - PT/OT rec, HH      Acute GI bleeding  GI scoped pt, duodenal ulcer seen, bx sent    Acute confusional state  NGT once ETT placed, start lactulose per NGT, titrate to 3x bowel movements daily  Recent Labs     09/24/19 0228   AMMONIA 124*        Hypothyroidism  Levothyroxine         Rick Cintron MD  General Surgery  [facility]

## 2019-09-24 NOTE — SUBJECTIVE & OBJECTIVE
Interval History: GCS of 7 vs. 8 this morning on rounds. Will plan for intubation.     Medications:  Continuous Infusions:   norepinephrine bitartrate-D5W Stopped (09/24/19 0502)     Scheduled Meds:   albuterol-ipratropium  3 mL Nebulization Q4H WAKE    cefTRIAXone  2 g Intravenous Q24H    lactulose  200 g Rectal TID    levothyroxine  88 mcg Intravenous Daily    pantoprazole  40 mg Intravenous BID     PRN Meds:benzocaine, Dextrose 10% Bolus, Dextrose 10% Bolus, glucagon (human recombinant), insulin aspart U-100, lidocaine, ondansetron, promethazine (PHENERGAN) IVPB, sodium chloride 0.9%     Review of patient's allergies indicates:   Allergen Reactions    Aspirin (bulk) Other (See Comments)    Heparin analogues Other (See Comments)     Difficulty to arouse    Nsaids (non-steroidal anti-inflammatory drug)      Objective:     Vital Signs (Most Recent):  Temp: 97.8 °F (36.6 °C) (09/24/19 0301)  Pulse: 101 (09/24/19 0600)  Resp: (!) 27 (09/24/19 0600)  BP: (!) 118/56 (09/24/19 0600)  SpO2: 96 % (09/24/19 0600) Vital Signs (24h Range):  Temp:  [97.3 °F (36.3 °C)-98.4 °F (36.9 °C)] 97.8 °F (36.6 °C)  Pulse:  [] 101  Resp:  [15-42] 27  SpO2:  [85 %-100 %] 96 %  BP: ()/(43-63) 118/56     Weight: 134.3 kg (296 lb 1.2 oz)  Body mass index is 49.27 kg/m².    Intake/Output - Last 3 Shifts       09/22 0700 - 09/23 0659 09/23 0700 - 09/24 0659 09/24 0700 - 09/25 0659    P.O. 0      I.V. (mL/kg) 1954.2 (14.6) 1153.6 (8.6)     Blood 430 116.3     Other       IV Piggyback 2000      Total Intake(mL/kg) 4384.2 (32.6) 1269.8 (9.5)     Urine (mL/kg/hr) 295 (0.1) 705 (0.2)     Emesis/NG output       Other 75 150     Stool 0 0     Blood       Total Output 370 855     Net +4014.2 +414.8            Urine Occurrence 1 x      Stool Occurrence 7 x 2 x           Physical Exam   Constitutional: She appears distressed.   HENT:   Head: Normocephalic and atraumatic.   Eyes:   Not opening eyes to painful stimulus    Cardiovascular: Normal rate.   Pulmonary/Chest: She is in respiratory distress. She has wheezes.   tachypneic     Abdominal: Soft. She exhibits no distension. There is no tenderness.   Neurological:   Responding minimally to sternal rub, intermittent moaning at best       Significant Labs:  CBC:   Recent Labs   Lab 09/24/19 0228   WBC 9.31   RBC 2.09*   HGB 7.2*   HCT 23.2*   PLT 53*   *   MCH 34.4*   MCHC 31.0*     CMP:   Recent Labs   Lab 09/24/19 0228      CALCIUM 8.4*   ALBUMIN 1.6*   PROT 4.5*      K 4.6   CO2 20*   *   BUN 62*   CREATININE 1.6*   ALKPHOS 82   ALT 32   AST 77*   BILITOT 5.8*     ABGs:   Recent Labs   Lab 09/23/19  1642   PH 7.359   PCO2 38.3   PO2 113*   HCO3 21.6*   POCSATURATED 98   BE -4       Significant Diagnostics:  I have reviewed all pertinent imaging results/findings within the past 24 hours.

## 2019-09-24 NOTE — TREATMENT PLAN
GI Treatment Plan    Zaira Gupta is a 58 y.o. female admitted to hospital 9/13/2019 (Hospital Day: 12) due to Small bowel perforation.     Interval History  Patient intubated overnight for decreased level of consciousness.  Hb remained stable with no transfusion requirement.  Melena continues.    Objective  Temp:  [97.3 °F (36.3 °C)-97.8 °F (36.6 °C)] 97.5 °F (36.4 °C) (09/24 1500)  Pulse:  [] 93 (09/24 1551)  BP: ()/(43-63) 94/52 (09/24 1515)  Resp:  [16-80] 25 (09/24 1551)  SpO2:  [92 %-100 %] 100 % (09/24 1551)    Laboratory    Recent Labs   Lab 09/23/19  2359 09/24/19  0228 09/24/19  0827   HGB 7.5* 7.2* 7.6*       Lab Results   Component Value Date    WBC 9.94 09/24/2019    HGB 7.6 (L) 09/24/2019    HCT 25.0 (L) 09/24/2019     (H) 09/24/2019    PLT 60 (L) 09/24/2019       Lab Results   Component Value Date     09/24/2019    K 4.6 09/24/2019     (H) 09/24/2019    CO2 20 (L) 09/24/2019    BUN 62 (H) 09/24/2019    CREATININE 1.6 (H) 09/24/2019    CALCIUM 8.4 (L) 09/24/2019    ANIONGAP 14 09/24/2019    ESTGFRAFRICA 41.2 (A) 09/24/2019    EGFRNONAA 35.8 (A) 09/24/2019       Lab Results   Component Value Date    ALT 32 09/24/2019    AST 77 (H) 09/24/2019    GGT 30 02/06/2012    ALKPHOS 82 09/24/2019    BILITOT 5.8 (H) 09/24/2019       Lab Results   Component Value Date    INR 1.9 (H) 09/24/2019    INR 2.5 (H) 09/23/2019    INR 2.6 (H) 09/22/2019       Plan  No evidence of ongoing bleeding  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 Ulcer seen in the duodenum likely due to same etiology.  Continue PPI IV BID.  Will follow up biopsies  - We are signing-off. Please call with any questions.    Thank you for involving us in the care of Zaira aGrcia Alonso. Please call with any additional questions, concerns or changes in the patient's clinical status.    Harry Castillo MD  Gastroenterology Fellow  Spectralink: [id number]

## 2019-09-24 NOTE — CARE UPDATE
Patient intubated bedside by MD tolerated well placed on Ventilation on ordered settings will continue to monitor.

## 2019-09-24 NOTE — EICU
Called to pt's room via e-lert to do time-out and charting for urgent intubation. See bedside procedure flowsheet.

## 2019-09-24 NOTE — SUBJECTIVE & OBJECTIVE
Interval History/Significant Events: No acute events overnight. Her mentation got worse during the morning time, for airway protection >> she got intubated and rectal tube has been placed     Follow-up For: Procedure(s) (LRB):  EGD (ESOPHAGOGASTRODUODENOSCOPY) (N/A)      Objective:     Vital Signs (Most Recent):  Temp: 97.6 °F (36.4 °C) (09/24/19 0700)  Pulse: 96 (09/24/19 0946)  Resp: (!) 21 (09/24/19 0946)  BP: (!) 89/52 (09/24/19 0946)  SpO2: 99 % (09/24/19 0946) Vital Signs (24h Range):  Temp:  [97.3 °F (36.3 °C)-98.4 °F (36.9 °C)] 97.6 °F (36.4 °C)  Pulse:  [] 96  Resp:  [16-80] 21  SpO2:  [85 %-100 %] 99 %  BP: ()/(43-63) 89/52     Weight: 134.3 kg (296 lb 1.2 oz)  Body mass index is 49.27 kg/m².      Intake/Output Summary (Last 24 hours) at 9/24/2019 1007  Last data filed at 9/24/2019 0900  Gross per 24 hour   Intake 1228.56 ml   Output 965 ml   Net 263.56 ml       Physical Exam   Constitutional: She appears well-developed and well-nourished. No distress.   Eyes: Conjunctivae are normal. Right eye exhibits no discharge. Left eye exhibits no discharge. No scleral icterus.   Cardiovascular: Normal rate and regular rhythm. Exam reveals no friction rub.   No murmur heard.  Pulmonary/Chest: Effort normal. No stridor. No respiratory distress. She has no wheezes. She has rales.   Intubated    Abdominal: Soft. Bowel sounds are normal. She exhibits no distension. There is no tenderness. There is no guarding.   Musculoskeletal: She exhibits no edema, tenderness or deformity.   Neurological:   On sedation    Skin: She is not diaphoretic.       Vents:  Vent Mode: A/C (09/24/19 0946)  Set Rate: 16 bmp (09/24/19 0946)  Vt Set: 400 mL (09/24/19 0946)  Pressure Support: 0 cmH20 (09/24/19 0946)  PEEP/CPAP: 8 cmH20 (09/24/19 0946)  Oxygen Concentration (%): 40 (09/24/19 0946)  Peak Airway Pressure: 18 cmH2O (09/24/19 0946)  Plateau Pressure: 0 cmH20 (09/24/19 0946)  Total Ve: 8.49 mL (09/24/19 0946)  F/VT Ratio<105  (RSBI): (!) 45.06 (09/24/19 0946)    Lines/Drains/Airways     Central Venous Catheter Line                 Percutaneous Central Line Insertion/Assessment - triple lumen  09/23/19 0300 right internal jugular 1 day          Drain                 Urethral Catheter 09/23/19 0130 Double-lumen 1 day         Rectal Tube 09/24/19 0700 fecal management system less than 1 day          Airway                 Airway - Non-Surgical 09/24/19 0739 Endotracheal Tube less than 1 day          Peripheral Intravenous Line                 Midline Catheter Insertion/Assessment  - Single Lumen 09/16/19 1032 Right brachial vein 18g x 10cm 7 days                Significant Labs:    CBC/Anemia Profile:  Recent Labs   Lab 09/23/19  2359 09/24/19  0228 09/24/19  0827   WBC 9.92 9.31 9.94   HGB 7.5* 7.2* 7.6*   HCT 24.1* 23.2* 25.0*   PLT 50* 53* 60*   * 111* 110*   RDW 23.8* 23.8* 24.1*        Chemistries:  Recent Labs   Lab 09/23/19  0400 09/23/19  1740 09/24/19  0228    144 145   K 4.5 4.6 4.6    111* 111*   CO2 21* 21* 20*   BUN 44* 53* 62*   CREATININE 1.1 1.4 1.6*   CALCIUM 8.1* 8.4* 8.4*   ALBUMIN 1.4* 1.6* 1.6*   PROT 4.3* 4.4* 4.5*   BILITOT 5.5* 6.0* 5.8*   ALKPHOS 81 77 82   ALT 30 31 32   AST 85* 79* 77*   MG 1.9  --  2.0   PHOS 3.4  --  4.9*       All pertinent labs within the past 24 hours have been reviewed.    Significant Imaging:  I have reviewed all pertinent imaging results/findings within the past 24 hours.

## 2019-09-24 NOTE — PLAN OF CARE
VSS. Pt on and off of levophed for goal of MAP > 65. Pt remains on NC @ 4 L/min. Gtts: Octreotide, Protonix and levophed. UOP (russ): 30-50/hr. 1 Large dark red bloody BM during shift. Wound Vac output minimal, see flowsheet. Pt is not awake, alert or oriented; her confusion has worsened over the shift. 1 lactulose enema given, was not retained, a large amount of dark red bloody was immediately returned. Pt moves all extremities spontaneously and will follow commands intermittently. Plan of care reviewed with pt and family and all questions and concerns addressed.

## 2019-09-25 NOTE — ASSESSMENT & PLAN NOTE
58F s/p 9/13 Ex-Lap and small bowel resection. Transferred to SICU early AM 9/23 for new GI bleed.    - Appreciate SICU assistance  - NPO, mIVF  - russ for strict I/Os  - q4h CBC  - RUQ U/S to eval TIPS  - Continue lactulose for altered mental status  - Transfuse this morning  - Need goals of care discussion with family now that path has resulted and patient's overall clinical picture is greatly declining  - Palliative care consult

## 2019-09-25 NOTE — PROGRESS NOTES
Pt does not follow commands, afebrile. SPONT 40% 8 PEEP. 500cc LR administered over night for decreased UO, pt responded well. Total UO 360cc/shift.  Lactulose enema administered, BMS output 300cc/shift.  Patient turned Q2 hours. POC reviewed with family. See flow sheets for detailed assessments.

## 2019-09-25 NOTE — NURSING
SICU team notified fibrinogen is 77 from labs drawn at 0800. 1u cryo administered. Repeat labs at 10:30 resulted fibrinogen 100 after cryo administration. Hemoglobin 6.9 and hematocrit 22.9 SICU team aware.

## 2019-09-25 NOTE — PLAN OF CARE
Pt withdraws to pain. Intubated this am, tolerated well. Vent settings Spont. 40% FiO2, 8 of PEEP. No gtts, maintaining MAPs > 65. NG tube placed per MD, pt tolerated well. MD notified of UO 20cc/hr, 500 cc of albumin given, pt responded well. Total  cc/shift. Lactulose enema given X 2, BMS output 300 cc/shift. Accuchecks q 6hr, no coverage needed. ABGs PRN. Plan of care reviewed with patient and family. All questions and concerns addressed.

## 2019-09-25 NOTE — PROGRESS NOTES
"[facility]  Critical Care - Surgery  Progress Note    Patient Name: Zaira Gupta  MRN: [id number]  Admission Date: 9/13/2019  Hospital Length of Stay: 12 days  Code Status: Full Code  Attending Provider: Zay Sánchez MD  Primary Care Provider: Minna Canchola NP   Principal Problem: Small bowel perforation    Subjective:     Hospital/ICU Course:  9/13: Admit s/p small bowel resection for small bowel perforation. Extubated, HDS, not on pressors.   9/15: Started on CLD without issues. Passing flatus.   9/16: stable for step down. PICC line placed, patient lost IV access.  9/17: stable for step down. Lost PICC line overnight. New consult placed.  - 9/23 Pt was stepped up from the floor due to bleeding per rectum (melena/bright red). She is stable, her mental status got worse during the afternoon. Started on lactulose for high ammonia. EGD was done showed not active duodenal ulcer, and major source of bleeding was found. Placed on protonix/octerotide gtt and ceftriaxone (for SBP prophylaxis)   - 9/24 During day time Pt GCS 8 or below but she was satting well on NC. She got intubated for airway protection, and rectal tube placement. Pathology from the Small bowel excision results from 9/13/19 returned today revealing "Diffuse large B-cell lymphoma. Hem/Onc are on board   - 9/25 Pt still has GCS of 8, her ammonia levels are trending down. Breathing spontaneously on the vent     Interval History/Significant Events: No acute events overnight. Pt GCS is around 8. Pupils dilated.    Follow-up For: Procedure(s) (LRB):  EGD (ESOPHAGOGASTRODUODENOSCOPY) (N/A)    Post-Operative Day: 2 Days Post-Op    Objective:     Vital Signs (Most Recent):  Temp: 99.4 °F (37.4 °C) (09/25/19 0915)  Pulse: 106 (09/25/19 0915)  Resp: 19 (09/25/19 0915)  BP: 124/61 (09/25/19 0915)  SpO2: 98 % (09/25/19 0915) Vital Signs (24h Range):  Temp:  [97.5 °F (36.4 °C)-99.4 °F (37.4 °C)] 99.4 °F (37.4 °C)  Pulse:  [] " 106  Resp:  [18-86] 19  SpO2:  [96 %-100 %] 98 %  BP: ()/(44-61) 124/61     Weight: (!) 143.6 kg (316 lb 9.3 oz)  Body mass index is 52.68 kg/m².      Intake/Output Summary (Last 24 hours) at 9/25/2019 0949  Last data filed at 9/25/2019 0902  Gross per 24 hour   Intake 811 ml   Output 1140 ml   Net -329 ml       Physical Exam   Constitutional: She appears well-developed and well-nourished. No distress.   Eyes: Conjunctivae are normal. Right eye exhibits no discharge. Left eye exhibits no discharge. No scleral icterus.   Cardiovascular: Normal rate and regular rhythm. Exam reveals no friction rub.   No murmur heard.  Pulmonary/Chest: Effort normal. No stridor. No respiratory distress. She has no wheezes. She has rales.   Intubated    Abdominal: Soft. Bowel sounds are normal. She exhibits no distension. There is no tenderness. There is no guarding.   Musculoskeletal: She exhibits no edema, tenderness or deformity.   Neurological:   On sedation    Skin: She is not diaphoretic.       Vents:  Vent Mode: Spont (09/25/19 0726)  Set Rate: 0 bmp (09/25/19 0726)  Vt Set: 400 mL (09/25/19 0726)  Pressure Support: 0 cmH20 (09/25/19 0726)  PEEP/CPAP: 8 cmH20 (09/25/19 0726)  Oxygen Concentration (%): 40 (09/25/19 0900)  Peak Airway Pressure: 11 cmH2O (09/25/19 0726)  Plateau Pressure: 0 cmH20 (09/25/19 0726)  Total Ve: 13.5 mL (09/25/19 0726)  F/VT Ratio<105 (RSBI): (!) 44.06 (09/25/19 0726)    Lines/Drains/Airways     Central Venous Catheter Line                 Percutaneous Central Line Insertion/Assessment - triple lumen  09/23/19 0300 right internal jugular 2 days          Drain                 Urethral Catheter 09/23/19 0130 Double-lumen 2 days         Rectal Tube 09/24/19 0700 fecal management system 1 day         NG/OG Tube 09/24/19 1200 Right nostril less than 1 day          Airway                 Airway - Non-Surgical 09/24/19 0739 Endotracheal Tube 1 day          Peripheral Intravenous Line                  Midline Catheter Insertion/Assessment  - Single Lumen 09/16/19 1032 Right brachial vein 18g x 10cm 8 days                Significant Labs:    CBC/Anemia Profile:  Recent Labs   Lab 09/24/19  1747 09/25/19  0400 09/25/19  0816   WBC 9.71 7.21 7.29   HGB 7.7* 7.4* 6.9*   HCT 24.1* 23.1* 22.9*   PLT 71* 60* 62*   * 110* 111*   RDW 24.9* 25.1* 25.3*        Chemistries:  Recent Labs   Lab 09/23/19  1740 09/24/19  0228 09/25/19  0400    145 146*   K 4.6 4.6 4.5   * 111* 112*   CO2 21* 20* 20*   BUN 53* 62* 81*   CREATININE 1.4 1.6* 2.0*   CALCIUM 8.4* 8.4* 8.1*   ALBUMIN 1.6* 1.6* 1.6*   PROT 4.4* 4.5* 4.7*   BILITOT 6.0* 5.8* 5.1*   ALKPHOS 77 82 88   ALT 31 32 34   AST 79* 77* 81*   MG  --  2.0 2.1   PHOS  --  4.9* 4.8*       All pertinent labs within the past 24 hours have been reviewed.    Significant Imaging:  I have reviewed all pertinent imaging results/findings within the past 24 hours.    Assessment/Plan:     * Small bowel perforation  Ms. Gupta is a 59 y/o lady with a known case of Alcohol induced hepatitis/chirrosis with splenomegaly, Portal HTN (S/P TIPS on 202), Reflex esophagitis, CKD 3 and hypothyroidism. Pt was previously stepped down from SICU  After she has EX-LAP for bowel perforation on 9/13/2019. She got stepped up from the floor due to blood/melena per rectum     Neuro:   - Sedation: Currently off propofol   - Since 9/23/2019 17:00 Pt got more confused, and on 9/24 she wasn't following commands >> got intubated for airway protection   - Etiology of AMS most likely due to high ammonia levels (hepatic encephalopathy)   - On lactulose PO and rectal     Plan:  - Assess her mental status throughout the day       Pulmonary:   - Currently intubated on 9/24 for airway protection due to low GCS    Vent Mode: Spont  Oxygen Concentration (%):  [40] 40  Resp Rate Total:  [16 br/min-26 br/min] 22 br/min  Vt Set:  [400 mL] 400 mL  PEEP/CPAP:  [8 cmH20] 8 cmH20  Pressure Support:  [0 cmH20] 0  "cmH20  Mean Airway Pressure:  [9.4 cmH20-9.6 cmH20] 9.6 cmH20      Cardiac:   - @ home he takes spironolactone 100 mg, and lasix 40 mg   - Hemodynamic stable  - Not on pressors nor drips   - No ECHO on chart         Renal: Known case of CKD 3  - Weinberg in place  - Monitor UOP  - Cr bumped up >> GENIE most likely due to pre-renal cause   - F/U BUN and CR     Plan:  - Start IV fluid maintenance (ringers lactate)         Fluids/Electrolytes/Nutrition/GI: Alcohol induced hepatitis/chirrosis with splenomegaly, Portal HTN (S/P TIPS on 202), Reflex esophagitis. S/P Bowel perforation and underwent EX LAP on 9/13   - NGT placed 9/24   - Rectal tube placed on 9/24   - Previously on Protonix and octreotide gtt. Currently on pantoprazole 40  mg BID  - On lactulose TID PO and rectal   - Nutritional status: Started on Trickle feeds 10 cc/hr  - replace lytes PRN     Plan:  - Trend ammonia levels        Hematology/Oncology: Small bowel excision biopsy results from 9/13/19 returned today revealing "Diffuse large B-cell lymphoma  - H/H has been stable since she received 2 U PRBC on 9/23 and 1 U on 9/25. Received cryoprecipitate 1 U on 9/24, and 9/25 for low fibrinogen.   - H/H has trended down to 6.9 (1 U PRBC will be given)  - F/U CBC, fibrinogen, PTT/INR  - Hem/Onc on board in regards the Lymphoma: they recommended tumor lysis syndrome workout   - Repeat fibrinogen is 100 (post cryo)     Plan:  - F/U tumor lysis syndrome workup  - F/U Hem/Onc recs      Infectious Disease:   - Afebrile  - WBC  WNL  - Currently on ceftriaxone 2g q24h for SBP prophylaxis X total of 5 days         Endocrine: Hypothyroidism   - Glucose goal of 140 - 180 u  - SSI           Shiva Salinas MD  Critical Care - Surgery  [facility]    "

## 2019-09-25 NOTE — PROGRESS NOTES
[facility]  General Surgery  Progress Note    Subjective:     History of Present Illness:  No notes on file    Post-Op Info:  Procedure(s) (LRB):  EGD (ESOPHAGOGASTRODUODENOSCOPY) (N/A)   2 Days Post-Op     Interval History: Intubated yesterday for worsening mental status. On minimal vent settings. Not on pressors.    Medications:  Continuous Infusions:   propofol Stopped (09/24/19 1800)     Scheduled Meds:   albuterol-ipratropium  3 mL Nebulization Q4H WAKE    cefTRIAXone (ROCEPHIN) IVPB  1 g Intravenous Q24H    chlorhexidine  15 mL Mouth/Throat BID    lactulose  20 g Per NG tube TID    levothyroxine  175 mcg Per NG tube Daily    pantoprazole  40 mg Intravenous BID    rifAXImin  550 mg Per NG tube BID     PRN Meds:sodium chloride, benzocaine, Dextrose 10% Bolus, Dextrose 10% Bolus, glucagon (human recombinant), insulin aspart U-100, lidocaine, ondansetron, promethazine (PHENERGAN) IVPB, sodium chloride 0.9%     Review of patient's allergies indicates:   Allergen Reactions    Aspirin (bulk) Other (See Comments)    Heparin analogues Other (See Comments)     Difficulty to arouse    Nsaids (non-steroidal anti-inflammatory drug)      Objective:     Vital Signs (Most Recent):  Temp: 99.4 °F (37.4 °C) (09/25/19 0915)  Pulse: 103 (09/25/19 1000)  Resp: 20 (09/25/19 1000)  BP: (!) 118/56 (09/25/19 1000)  SpO2: 99 % (09/25/19 1000) Vital Signs (24h Range):  Temp:  [97.5 °F (36.4 °C)-99.4 °F (37.4 °C)] 99.4 °F (37.4 °C)  Pulse:  [] 103  Resp:  [18-86] 20  SpO2:  [96 %-100 %] 99 %  BP: ()/(44-61) 118/56     Weight: (!) 143.6 kg (316 lb 9.3 oz)  Body mass index is 52.68 kg/m².    Intake/Output - Last 3 Shifts       09/23 0700 - 09/24 0659 09/24 0700 - 09/25 0659 09/25 0700 - 09/26 0659    P.O.       I.V. (mL/kg) 1153.6 (8.6) 236 (1.6)     Blood 116.3  75    NG/GT   0    IV Piggyback  500     Total Intake(mL/kg) 1269.8 (9.5) 736 (5.1) 75 (0.5)    Urine (mL/kg/hr) 705 (0.2) 745 (0.2) 210  (0.4)    Drains   0    Other 150 150 0    Stool 0 300     Total Output 855 1195 210    Net +414.8 -459 -135           Stool Occurrence 2 x            Physical Exam   Constitutional:   Intubated. Not on sedation. Minimal response to stimuli   HENT:   Head: Normocephalic and atraumatic.   Eyes:   Not opening eyes to painful stimulus   Cardiovascular: Normal rate.   Pulmonary/Chest:   Vent Mode: Spont  Oxygen Concentration (%):  (40) 40  Resp Rate Total:  (16 br/min-34 br/min) 21 br/min  Vt Set:  (400 mL) 400 mL  PEEP/CPAP:  (8 cmH20) 8 cmH20  Pressure Support:  (0 cmH20) 0 cmH20  Mean Airway Pressure:  (9.4 cmH20-9.6 cmH20) 9.5 cmH20     Abdominal: Soft. She exhibits no distension. There is no tenderness.   Neurological:   Minimal response to stimuli       Significant Labs:  CBC:   Recent Labs   Lab 09/25/19  0816   WBC 7.29   RBC 2.06*   HGB 6.9*   HCT 22.9*   PLT 62*   *   MCH 33.5*   MCHC 30.1*     CMP:   Recent Labs   Lab 09/25/19  0400   GLU 96   CALCIUM 8.1*   ALBUMIN 1.6*   PROT 4.7*   *   K 4.5   CO2 20*   *   BUN 81*   CREATININE 2.0*   ALKPHOS 88   ALT 34   AST 81*   BILITOT 5.1*     ABGs:   Recent Labs   Lab 09/24/19  0908   PH 7.345*   PCO2 38.8   PO2 105*   HCO3 21.2*   POCSATURATED 98   BE -5     Significant Diagnostics:  I have reviewed all pertinent imaging results/findings within the past 24 hours.    Assessment/Plan:     * Small bowel perforation  58F s/p 9/13 Ex-Lap and small bowel resection. Transferred to SICU early AM 9/23 for new GI bleed.    - Appreciate SICU assistance  - NPO, mIVF  - russ for strict I/Os  - q4h CBC  - RUQ U/S to eval TIPS  - Continue lactulose for altered mental status  - Transfuse this morning  - Need goals of care discussion with family now that path has resulted and patient's overall clinical picture is greatly declining  - Palliative care consult      Acute GI bleeding  GI scoped pt, duodenal ulcer seen, bx sent    Acute confusional state  NGT once ETT  placed, start lactulose per NGT, titrate to 3x bowel movements daily  Recent Labs     09/24/19  0228   AMMONIA 124*        Hypothyroidism  Levothyroxine         Lien Arango MD  General Surgery  [facility]

## 2019-09-25 NOTE — PLAN OF CARE
Brief Hematology Follow-Up Note    Patient seen today, opening eyes but not following commands. No family at bedside.    Impression  1) Stage IV DLBCL, diagnosed by bowel resection 9/13/19  -Available imaging to date: CTA chest 4/1/19 showed pulmonary nodules ~1cm. CT A/P on 9/12/19 showed perinephric LN, moderate splenomegaly, small cirrhotic liver     2) Transaminitis in setting of history of autoimmune hepatitis and cirrhosis  -Tbili 5.1 today     3) Coagulopathy due to liver dysfunction vs DIC     4) GENIE  -Cr 2.0 today     5) Thrombocytopenia, anemia, possibly d/t lymphoma, vs related to medications, blood loss, critical illness     6) AMS with concern for airway protection  -Patient intubated on 9/24     Recommendations  -Obtain daily tumor lysis labs: uric acid, LDH, Mg, PO4, BMP  -Obtain CT neck/C/A/P without contrast if kidney function does not permit contrast  -Start allopurinol for TLS prophylaxis  -Await c-myc results  -Patient high risk for treatment (including risk of bowel perforation, and worsening hepatic encephalopathy) and mortality is 100% without treatment. Agree with palliative discussions regarding goals of care.  -Prognosis has not been discussed with family by our service yet, please contact our service if family has any questions. Primary team to discuss biopsy results  -Transfuse for fibrinogen<100, Hb<7, plt<10 or active bleeding     The following was staffed and discussed with supervising physician Dr. Camacho. Please contact BMT Fellow with any questions.     Sarai Hinkle MD PGY-V  Hematology/Oncology Fellow

## 2019-09-25 NOTE — ASSESSMENT & PLAN NOTE
Ms. Gupta is a 59 y/o lady with a known case of Alcohol induced hepatitis/chirrosis with splenomegaly, Portal HTN (S/P TIPS on 202), Reflex esophagitis, CKD 3 and hypothyroidism. Pt was previously stepped down from SICU  After she has EX-LAP for bowel perforation on 9/13/2019. She got stepped up from the floor due to blood/melena per rectum     Neuro:   - Sedation: Currently off propofol   - Since 9/23/2019 17:00 Pt got more confused, and on 9/24 she wasn't following commands at all >> got intubated for airway protection   - Etiology of AMS most likely due to high ammonia levels (hepatic encephalopathy)   - On lactulose PO and rectal     Plan:  - Assess her mental status throughout the day       Pulmonary:   - Currently intubated on 9/24 for airway protection due to low GCS    Vent Mode: Spont  Oxygen Concentration (%):  [40] 40  Resp Rate Total:  [16 br/min-26 br/min] 22 br/min  Vt Set:  [400 mL] 400 mL  PEEP/CPAP:  [8 cmH20] 8 cmH20  Pressure Support:  [0 cmH20] 0 cmH20  Mean Airway Pressure:  [9.4 cmH20-9.6 cmH20] 9.6 cmH20      Cardiac:   - @ home he takes spironolactone 100 mg, and lasix 40 mg   - Hemodynamic stable  - Not on pressors nor drips   - No ECHO on chart         Renal: Known case of CKD 3  - Weinberg in place  - Monitor UOP  - Cr bumped up >> GENIE most likely due to pre-renal cause   - F/U BUN and CR     Plan:  - Start IV fluid maintenance (ringers lactate)         Fluids/Electrolytes/Nutrition/GI: Alcohol induced hepatitis/chirrosis with splenomegaly, Portal HTN (S/P TIPS on 202), Reflex esophagitis. S/P Bowel perforation and underwent EX LAP on 9/13   - NGT placed 9/24   - Rectal tube placed on 9/24   - Previously on Protonix and octreotide gtt. Currently on pantoprazole 40  mg BID  - On lactulose TID PO and rectal   - Nutritional status: Started on Trickle feeds 10 cc/hr  - replace lytes PRN     Plan:  - Trend ammonia levels        Hematology/Oncology: Small bowel excision biopsy results from  "9/13/19 returned today revealing "Diffuse large B-cell lymphoma  - H/H has been stable since she received 2 U PRBC on 9/23 and 1 U on 9/25. Received cryoprecipitate 1 U on 9/24, and 9/25 for low fibrinogen.   - H/H has trended down to 6.9 (1 U PRBC will be given)  - F/U CBC, fibrinogen, PTT/INR  - Hem/Onc on board in regards the Lymphoma: they recommended tumor lysis syndrome workout   - Repeat fibrinogen is 100 (post cryo)     Plan:  - F/U tumor lysis syndrome workup  - F/U Hem/Onc recs      Infectious Disease:   - Afebrile  - WBC  WNL  - Currently on ceftriaxone 2g q24h for SBP prophylaxis X total of 5 days         Endocrine: Hypothyroidism   - Glucose goal of 140 - 180 u  - SSI    "

## 2019-09-25 NOTE — SUBJECTIVE & OBJECTIVE
Interval History/Significant Events: No acute events overnight. Pt GCS is around 8. Pupils dilated.    Follow-up For: Procedure(s) (LRB):  EGD (ESOPHAGOGASTRODUODENOSCOPY) (N/A)    Post-Operative Day: 2 Days Post-Op    Objective:     Vital Signs (Most Recent):  Temp: 99.4 °F (37.4 °C) (09/25/19 0915)  Pulse: 106 (09/25/19 0915)  Resp: 19 (09/25/19 0915)  BP: 124/61 (09/25/19 0915)  SpO2: 98 % (09/25/19 0915) Vital Signs (24h Range):  Temp:  [97.5 °F (36.4 °C)-99.4 °F (37.4 °C)] 99.4 °F (37.4 °C)  Pulse:  [] 106  Resp:  [18-86] 19  SpO2:  [96 %-100 %] 98 %  BP: ()/(44-61) 124/61     Weight: (!) 143.6 kg (316 lb 9.3 oz)  Body mass index is 52.68 kg/m².      Intake/Output Summary (Last 24 hours) at 9/25/2019 0949  Last data filed at 9/25/2019 0902  Gross per 24 hour   Intake 811 ml   Output 1140 ml   Net -329 ml       Physical Exam   Constitutional: She appears well-developed and well-nourished. No distress.   Eyes: Conjunctivae are normal. Right eye exhibits no discharge. Left eye exhibits no discharge. No scleral icterus.   Cardiovascular: Normal rate and regular rhythm. Exam reveals no friction rub.   No murmur heard.  Pulmonary/Chest: Effort normal. No stridor. No respiratory distress. She has no wheezes. She has rales.   Intubated    Abdominal: Soft. Bowel sounds are normal. She exhibits no distension. There is no tenderness. There is no guarding.   Musculoskeletal: She exhibits no edema, tenderness or deformity.   Neurological:   On sedation    Skin: She is not diaphoretic.       Vents:  Vent Mode: Spont (09/25/19 0726)  Set Rate: 0 bmp (09/25/19 0726)  Vt Set: 400 mL (09/25/19 0726)  Pressure Support: 0 cmH20 (09/25/19 0726)  PEEP/CPAP: 8 cmH20 (09/25/19 0726)  Oxygen Concentration (%): 40 (09/25/19 0900)  Peak Airway Pressure: 11 cmH2O (09/25/19 0726)  Plateau Pressure: 0 cmH20 (09/25/19 0726)  Total Ve: 13.5 mL (09/25/19 0726)  F/VT Ratio<105 (RSBI): (!) 44.06 (09/25/19  0726)    Lines/Drains/Airways     Central Venous Catheter Line                 Percutaneous Central Line Insertion/Assessment - triple lumen  09/23/19 0300 right internal jugular 2 days          Drain                 Urethral Catheter 09/23/19 0130 Double-lumen 2 days         Rectal Tube 09/24/19 0700 fecal management system 1 day         NG/OG Tube 09/24/19 1200 Right nostril less than 1 day          Airway                 Airway - Non-Surgical 09/24/19 0739 Endotracheal Tube 1 day          Peripheral Intravenous Line                 Midline Catheter Insertion/Assessment  - Single Lumen 09/16/19 1032 Right brachial vein 18g x 10cm 8 days                Significant Labs:    CBC/Anemia Profile:  Recent Labs   Lab 09/24/19  1747 09/25/19  0400 09/25/19  0816   WBC 9.71 7.21 7.29   HGB 7.7* 7.4* 6.9*   HCT 24.1* 23.1* 22.9*   PLT 71* 60* 62*   * 110* 111*   RDW 24.9* 25.1* 25.3*        Chemistries:  Recent Labs   Lab 09/23/19  1740 09/24/19  0228 09/25/19  0400    145 146*   K 4.6 4.6 4.5   * 111* 112*   CO2 21* 20* 20*   BUN 53* 62* 81*   CREATININE 1.4 1.6* 2.0*   CALCIUM 8.4* 8.4* 8.1*   ALBUMIN 1.6* 1.6* 1.6*   PROT 4.4* 4.5* 4.7*   BILITOT 6.0* 5.8* 5.1*   ALKPHOS 77 82 88   ALT 31 32 34   AST 79* 77* 81*   MG  --  2.0 2.1   PHOS  --  4.9* 4.8*       All pertinent labs within the past 24 hours have been reviewed.    Significant Imaging:  I have reviewed all pertinent imaging results/findings within the past 24 hours.

## 2019-09-25 NOTE — SUBJECTIVE & OBJECTIVE
Interval History: Intubated yesterday for worsening mental status. On minimal vent settings. Not on pressors.    Medications:  Continuous Infusions:   propofol Stopped (09/24/19 1800)     Scheduled Meds:   albuterol-ipratropium  3 mL Nebulization Q4H WAKE    cefTRIAXone (ROCEPHIN) IVPB  1 g Intravenous Q24H    chlorhexidine  15 mL Mouth/Throat BID    lactulose  20 g Per NG tube TID    levothyroxine  175 mcg Per NG tube Daily    pantoprazole  40 mg Intravenous BID    rifAXImin  550 mg Per NG tube BID     PRN Meds:sodium chloride, benzocaine, Dextrose 10% Bolus, Dextrose 10% Bolus, glucagon (human recombinant), insulin aspart U-100, lidocaine, ondansetron, promethazine (PHENERGAN) IVPB, sodium chloride 0.9%     Review of patient's allergies indicates:   Allergen Reactions    Aspirin (bulk) Other (See Comments)    Heparin analogues Other (See Comments)     Difficulty to arouse    Nsaids (non-steroidal anti-inflammatory drug)      Objective:     Vital Signs (Most Recent):  Temp: 99.4 °F (37.4 °C) (09/25/19 0915)  Pulse: 103 (09/25/19 1000)  Resp: 20 (09/25/19 1000)  BP: (!) 118/56 (09/25/19 1000)  SpO2: 99 % (09/25/19 1000) Vital Signs (24h Range):  Temp:  [97.5 °F (36.4 °C)-99.4 °F (37.4 °C)] 99.4 °F (37.4 °C)  Pulse:  [] 103  Resp:  [18-86] 20  SpO2:  [96 %-100 %] 99 %  BP: ()/(44-61) 118/56     Weight: (!) 143.6 kg (316 lb 9.3 oz)  Body mass index is 52.68 kg/m².    Intake/Output - Last 3 Shifts       09/23 0700 - 09/24 0659 09/24 0700 - 09/25 0659 09/25 0700 - 09/26 0659    P.O.       I.V. (mL/kg) 1153.6 (8.6) 236 (1.6)     Blood 116.3  75    NG/GT   0    IV Piggyback  500     Total Intake(mL/kg) 1269.8 (9.5) 736 (5.1) 75 (0.5)    Urine (mL/kg/hr) 705 (0.2) 745 (0.2) 210 (0.4)    Drains   0    Other 150 150 0    Stool 0 300     Total Output 855 1195 210    Net +414.8 -459 -135           Stool Occurrence 2 x            Physical Exam   Constitutional:   Intubated. Not on sedation. Minimal  response to stimuli   HENT:   Head: Normocephalic and atraumatic.   Eyes:   Not opening eyes to painful stimulus   Cardiovascular: Normal rate.   Pulmonary/Chest:   Vent Mode: Spont  Oxygen Concentration (%):  (40) 40  Resp Rate Total:  (16 br/min-34 br/min) 21 br/min  Vt Set:  (400 mL) 400 mL  PEEP/CPAP:  (8 cmH20) 8 cmH20  Pressure Support:  (0 cmH20) 0 cmH20  Mean Airway Pressure:  (9.4 cmH20-9.6 cmH20) 9.5 cmH20     Abdominal: Soft. She exhibits no distension. There is no tenderness.   Neurological:   Minimal response to stimuli       Significant Labs:  CBC:   Recent Labs   Lab 09/25/19  0816   WBC 7.29   RBC 2.06*   HGB 6.9*   HCT 22.9*   PLT 62*   *   MCH 33.5*   MCHC 30.1*     CMP:   Recent Labs   Lab 09/25/19  0400   GLU 96   CALCIUM 8.1*   ALBUMIN 1.6*   PROT 4.7*   *   K 4.5   CO2 20*   *   BUN 81*   CREATININE 2.0*   ALKPHOS 88   ALT 34   AST 81*   BILITOT 5.1*     ABGs:   Recent Labs   Lab 09/24/19  0908   PH 7.345*   PCO2 38.8   PO2 105*   HCO3 21.2*   POCSATURATED 98   BE -5     Significant Diagnostics:  I have reviewed all pertinent imaging results/findings within the past 24 hours.

## 2019-09-26 PROBLEM — Z51.5 PALLIATIVE CARE ENCOUNTER: Status: ACTIVE | Noted: 2019-01-01

## 2019-09-26 NOTE — ASSESSMENT & PLAN NOTE
Ms. Gupta is a 59 y/o lady with a known case of Alcohol induced hepatitis/chirrosis with splenomegaly, Portal HTN (S/P TIPS on 202), Reflex esophagitis, CKD 3 and hypothyroidism. Pt was previously stepped down from SICU  After she has EX-LAP for bowel perforation on 9/13/2019. She got stepped up from the floor due to blood/melena per rectum. Pathology report from bowel resection biopsy showed diffuse B cell lymphoma (with poor prognosis) based on Hem/Onc. As mentioned by hem/Onc that the intra-luminal lymphoma might had been the cause of the perforation, and even with starting chemo the Pt might have additional perforations     Neuro:   - Sedation: Currently off propofol   - On 9/24 got intubated for airway protection (due to severe altered mental status)  - Etiology of AMS most likely due to high ammonia levels (hepatic encephalopathy) >> Pt today is more alert, and following commands  - Previously on PO/Rectal lactulose >> Currently switched to rifaximin     Plan:  - Assess her mental status throughout the day       Pulmonary:   - Currently intubated on 9/24 for airway protection due to low GCS      Cardiac:   - @ home he takes spironolactone 100 mg, and lasix 40 mg   - Hemodynamic stable  - Not on pressors nor drips   - No ECHO on chart         Renal: Known case of CKD 3  - Weinberg in place  - Monitor UOP  - on 9/25 Cr bumped up >> GENIE most likely due to pre-renal cause Vs. Elevated uric acid Vs. Hepatorenal picture   - F/U BUN and CR, has been stable since 9/25   - On IVF maintenance   - Uric acid levels are elevated >> started on allopurinol    Plan:  - F/U Uric acid         Fluids/Electrolytes/Nutrition/GI: Alcohol induced hepatitis/chirrosis with splenomegaly, Portal HTN (S/P TIPS on 202), Reflex esophagitis. S/P Bowel perforation and underwent EX LAP on 9/13   - NGT placed 9/24   - Rectal tube placed on 9/24   - Previously on Protonix and octreotide gtt. Currently on pantoprazole 40  mg BID  - Nutritional  "status: Started on Trickle feeds 10 cc/hr  - replace lytes PRN     Plan:  - Trend ammonia levels        Hematology/Oncology: Small bowel excision biopsy results from 9/13/19 returned today revealing "Diffuse large B-cell lymphoma  - H/H has been stable since she received 2 U PRBC on 9/23 and 1 U on 9/25. Received cryoprecipitate 1 U on 9/24, and 9/25 for low fibrinogen.   - H/H stable   - F/U CBC, fibrinogen, PTT/INR  - Hem/Onc on board in regards the Lymphoma >> recommendations = Transfuse for fibrinogen <100, Hb <7, plt <10 or active bleeding, and daily tumor lysis workup   - Palliative care on board     Plan:  - F/U Hem/Onc recs      Infectious Disease:   - Afebrile  - WBC  WNL  - Currently on ceftriaxone 2g q24h for SBP prophylaxis X total of 5 days         Endocrine: Hypothyroidism   - Glucose goal of 140 - 180 u  - SSI    "

## 2019-09-26 NOTE — PLAN OF CARE
"Dx: Small bowel perforation    Shift Events: No acute events. Afebrile. VSS. MAP>65 per order.      Neuro: Arouses to Voice, Follows Commands and Moves All Extremities    Vital Signs: BP (!) 122/57 (BP Location: Left arm, Patient Position: Lying)   Pulse 97   Temp 98.5 °F (36.9 °C) (Oral)   Resp 18   Ht 5' 5" (1.651 m)   Wt (!) 143.6 kg (316 lb 9.3 oz)   LMP  (LMP Unknown)   SpO2 100%   Breastfeeding? No   BMI 52.68 kg/m²     Diet: NPO and Tube Feeds    Gtts: MIVF    Urine Output: Urinary Catheter 1270 cc/shift    Drains: Rectal tube 50 cc      Labs/Accuchecks: q6. No coverage needed     Skin: No new skin breakdown noted.       "

## 2019-09-26 NOTE — SUBJECTIVE & OBJECTIVE
Interval History/Significant Events: No acute events overnight. Pt is has been stable. Pt was opening eyes and following commands, but very weak. Hem/Onc on board. Uric acid levels are high >> started on allopurinol. Cr today is 2.1      Follow-up For: Procedure(s) (LRB):  EGD (ESOPHAGOGASTRODUODENOSCOPY) (N/A)    Post-Operative Day: 3 Days Post-Op    Objective:     Vital Signs (Most Recent):  Temp: 98 °F (36.7 °C) (09/26/19 0700)  Pulse: 98 (09/26/19 0900)  Resp: 17 (09/26/19 0900)  BP: (!) 125/57 (09/26/19 0900)  SpO2: 100 % (09/26/19 0900) Vital Signs (24h Range):  Temp:  [98 °F (36.7 °C)-99.3 °F (37.4 °C)] 98 °F (36.7 °C)  Pulse:  [] 98  Resp:  [16-27] 17  SpO2:  [92 %-100 %] 100 %  BP: (106-151)/(51-65) 125/57     Weight: (!) 138.1 kg (304 lb 7.3 oz)  Body mass index is 50.66 kg/m².      Intake/Output Summary (Last 24 hours) at 9/26/2019 1012  Last data filed at 9/26/2019 0900  Gross per 24 hour   Intake 2132.5 ml   Output 1720 ml   Net 412.5 ml       Physical Exam   Constitutional: She appears well-developed and well-nourished. No distress.   Eyes: Conjunctivae are normal. Right eye exhibits no discharge. Left eye exhibits no discharge. No scleral icterus.   Cardiovascular: Normal rate and regular rhythm. Exam reveals no friction rub.   No murmur heard.  Pulmonary/Chest: Effort normal. No stridor. No respiratory distress. She has no wheezes. She has rales.   Intubated    Abdominal: Soft. Bowel sounds are normal. She exhibits no distension. There is no tenderness. There is no guarding.   Musculoskeletal: She exhibits no edema, tenderness or deformity.   Neurological:   On sedation    Skin: She is not diaphoretic.       Vents:  Vent Mode: Spont (09/26/19 0855)  Set Rate: 0 bmp (09/26/19 0855)  Vt Set: 400 mL (09/26/19 0855)  Pressure Support: 0 cmH20 (09/26/19 0855)  PEEP/CPAP: 8 cmH20 (09/26/19 0855)  Oxygen Concentration (%): 40 (09/26/19 0900)  Peak Airway Pressure: 10 cmH2O (09/26/19 0855)  Plateau  Pressure: 0 cmH20 (09/26/19 0855)  Total Ve: 10.4 mL (09/26/19 0855)  F/VT Ratio<105 (RSBI): (!) 22.35 (09/26/19 0855)    Lines/Drains/Airways     Central Venous Catheter Line                 Percutaneous Central Line Insertion/Assessment - triple lumen  09/23/19 0300 right internal jugular 3 days          Drain                 Urethral Catheter 09/23/19 0130 Double-lumen 3 days         Rectal Tube 09/24/19 0700 fecal management system 2 days         NG/OG Tube 09/24/19 1200 Right nostril 1 day          Airway                 Airway - Non-Surgical 09/24/19 0739 Endotracheal Tube 2 days          Peripheral Intravenous Line                 Midline Catheter Insertion/Assessment  - Single Lumen 09/16/19 1032 Right brachial vein 18g x 10cm 9 days                Significant Labs:    CBC/Anemia Profile:  Recent Labs   Lab 09/25/19  2030 09/26/19  0300 09/26/19  0850   WBC 8.00  8.00 6.44 5.89   HGB 7.7*  7.7* 7.5* 7.4*   HCT 25.2*  25.2* 23.8* 24.5*   PLT 54*  54* 54* 49*   *  110* 109* 111*   RDW 24.2*  24.2* 24.7* 25.0*        Chemistries:  Recent Labs   Lab 09/25/19  0400 09/26/19  0300   * 147*   K 4.5 4.0   * 113*   CO2 20* 20*   BUN 81* 87*   CREATININE 2.0* 2.1*   CALCIUM 8.1* 8.0*   ALBUMIN 1.6* 1.5*   PROT 4.7* 4.5*   BILITOT 5.1* 6.8*   ALKPHOS 88 83   ALT 34 34   AST 81* 90*   MG 2.1 2.3   PHOS 4.8* 4.3       All pertinent labs within the past 24 hours have been reviewed.    Significant Imaging:  I have reviewed all pertinent imaging results/findings within the past 24 hours.

## 2019-09-26 NOTE — PROGRESS NOTES
"[facility]  Critical Care - Surgery  Progress Note    Patient Name: Zaira Gupta  MRN: [id number]  Admission Date: 9/13/2019  Hospital Length of Stay: 13 days  Code Status: Full Code  Attending Provider: Zay Sánchez MD  Primary Care Provider: Minna Canchola NP   Principal Problem: Small bowel perforation    Subjective:     Hospital/ICU Course:  9/13: Admit s/p small bowel resection for small bowel perforation. Extubated, HDS, not on pressors.   9/15: Started on CLD without issues. Passing flatus.   9/16: stable for step down. PICC line placed, patient lost IV access.  9/17: stable for step down. Lost PICC line overnight. New consult placed.  - 9/23 Pt was stepped up from the floor due to bleeding per rectum (melena/bright red). She is stable, her mental status got worse during the afternoon. Started on lactulose for high ammonia. EGD was done showed not active duodenal ulcer, and major source of bleeding was found. Placed on protonix/octerotide gtt and ceftriaxone (for SBP prophylaxis)   - 9/24 During day time Pt GCS 8 or below but she was satting well on NC. She got intubated for airway protection, and rectal tube placement. Pathology from the Small bowel excision results from 9/13/19 returned today revealing "Diffuse large B-cell lymphoma. Hem/Onc are on board   - 9/25 Pt still has GCS of 8, her ammonia levels are trending down. Breathing spontaneously on the vent   - 9/26 Today Pt was opening eyes and following commands, but very weak. Hem/Onc on board. Uric acid levels are high >> started on allopurinol. Cr today is 2.1      Interval History/Significant Events: No acute events overnight. Pt is has been stable. Pt was opening eyes and following commands, but very weak. Hem/Onc on board. Uric acid levels are high >> started on allopurinol. Cr today is 2.1      Follow-up For: Procedure(s) (LRB):  EGD (ESOPHAGOGASTRODUODENOSCOPY) (N/A)    Post-Operative Day: 3 Days Post-Op    Objective: "     Vital Signs (Most Recent):  Temp: 98 °F (36.7 °C) (09/26/19 0700)  Pulse: 98 (09/26/19 0900)  Resp: 17 (09/26/19 0900)  BP: (!) 125/57 (09/26/19 0900)  SpO2: 100 % (09/26/19 0900) Vital Signs (24h Range):  Temp:  [98 °F (36.7 °C)-99.3 °F (37.4 °C)] 98 °F (36.7 °C)  Pulse:  [] 98  Resp:  [16-27] 17  SpO2:  [92 %-100 %] 100 %  BP: (106-151)/(51-65) 125/57     Weight: (!) 138.1 kg (304 lb 7.3 oz)  Body mass index is 50.66 kg/m².      Intake/Output Summary (Last 24 hours) at 9/26/2019 1012  Last data filed at 9/26/2019 0900  Gross per 24 hour   Intake 2132.5 ml   Output 1720 ml   Net 412.5 ml       Physical Exam   Constitutional: She appears well-developed and well-nourished. No distress.   Eyes: Conjunctivae are normal. Right eye exhibits no discharge. Left eye exhibits no discharge. No scleral icterus.   Cardiovascular: Normal rate and regular rhythm. Exam reveals no friction rub.   No murmur heard.  Pulmonary/Chest: Effort normal. No stridor. No respiratory distress. She has no wheezes. She has rales.   Intubated    Abdominal: Soft. Bowel sounds are normal. She exhibits no distension. There is no tenderness. There is no guarding.   Musculoskeletal: She exhibits no edema, tenderness or deformity.   Neurological:   On sedation    Skin: She is not diaphoretic.       Vents:  Vent Mode: Spont (09/26/19 0855)  Set Rate: 0 bmp (09/26/19 0855)  Vt Set: 400 mL (09/26/19 0855)  Pressure Support: 0 cmH20 (09/26/19 0855)  PEEP/CPAP: 8 cmH20 (09/26/19 0855)  Oxygen Concentration (%): 40 (09/26/19 0900)  Peak Airway Pressure: 10 cmH2O (09/26/19 0855)  Plateau Pressure: 0 cmH20 (09/26/19 0855)  Total Ve: 10.4 mL (09/26/19 0855)  F/VT Ratio<105 (RSBI): (!) 22.35 (09/26/19 0855)    Lines/Drains/Airways     Central Venous Catheter Line                 Percutaneous Central Line Insertion/Assessment - triple lumen  09/23/19 0300 right internal jugular 3 days          Drain                 Urethral Catheter 09/23/19 0130  Double-lumen 3 days         Rectal Tube 09/24/19 0700 fecal management system 2 days         NG/OG Tube 09/24/19 1200 Right nostril 1 day          Airway                 Airway - Non-Surgical 09/24/19 0739 Endotracheal Tube 2 days          Peripheral Intravenous Line                 Midline Catheter Insertion/Assessment  - Single Lumen 09/16/19 1032 Right brachial vein 18g x 10cm 9 days                Significant Labs:    CBC/Anemia Profile:  Recent Labs   Lab 09/25/19  2030 09/26/19  0300 09/26/19  0850   WBC 8.00  8.00 6.44 5.89   HGB 7.7*  7.7* 7.5* 7.4*   HCT 25.2*  25.2* 23.8* 24.5*   PLT 54*  54* 54* 49*   *  110* 109* 111*   RDW 24.2*  24.2* 24.7* 25.0*        Chemistries:  Recent Labs   Lab 09/25/19  0400 09/26/19  0300   * 147*   K 4.5 4.0   * 113*   CO2 20* 20*   BUN 81* 87*   CREATININE 2.0* 2.1*   CALCIUM 8.1* 8.0*   ALBUMIN 1.6* 1.5*   PROT 4.7* 4.5*   BILITOT 5.1* 6.8*   ALKPHOS 88 83   ALT 34 34   AST 81* 90*   MG 2.1 2.3   PHOS 4.8* 4.3       All pertinent labs within the past 24 hours have been reviewed.    Significant Imaging:  I have reviewed all pertinent imaging results/findings within the past 24 hours.    Assessment/Plan:     * Small bowel perforation  Ms. Gupta is a 59 y/o lady with a known case of Alcohol induced hepatitis/chirrosis with splenomegaly, Portal HTN (S/P TIPS on 202), Reflex esophagitis, CKD 3 and hypothyroidism. Pt was previously stepped down from SICU  After she has EX-LAP for bowel perforation on 9/13/2019. She got stepped up from the floor due to blood/melena per rectum. Pathology report from bowel resection biopsy showed diffuse B cell lymphoma (with poor prognosis) based on Hem/Onc. As mentioned by hem/Onc that the intra-luminal lymphoma might had been the cause of the perforation, and even with starting chemo the Pt might have additional perforations     Neuro:   - Sedation: Currently off propofol   - On 9/24 got intubated for airway protection  "(due to severe altered mental status)  - Etiology of AMS most likely due to high ammonia levels (hepatic encephalopathy) >> Pt today is more alert, and following commands  - Previously on PO/Rectal lactulose >> Currently switched to rifaximin     Plan:  - Assess her mental status throughout the day       Pulmonary:   - Currently intubated on 9/24 for airway protection due to low GCS      Cardiac:   - @ home he takes spironolactone 100 mg, and lasix 40 mg   - Hemodynamic stable  - Not on pressors nor drips   - No ECHO on chart         Renal: Known case of CKD 3  - Weinberg in place  - Monitor UOP  - on 9/25 Cr bumped up >> GENIE most likely due to pre-renal cause Vs. Elevated uric acid Vs. Hepatorenal picture   - F/U BUN and CR, has been stable since 9/25   - On IVF maintenance   - Uric acid levels are elevated >> started on allopurinol    Plan:  - F/U Uric acid         Fluids/Electrolytes/Nutrition/GI: Alcohol induced hepatitis/chirrosis with splenomegaly, Portal HTN (S/P TIPS on 202), Reflex esophagitis. S/P Bowel perforation and underwent EX LAP on 9/13   - NGT placed 9/24   - Rectal tube placed on 9/24   - Previously on Protonix and octreotide gtt. Currently on pantoprazole 40  mg BID  - Nutritional status: Started on Trickle feeds 10 cc/hr  - replace lytes PRN     Plan:  - Trend ammonia levels        Hematology/Oncology: Small bowel excision biopsy results from 9/13/19 returned today revealing "Diffuse large B-cell lymphoma  - H/H has been stable since she received 2 U PRBC on 9/23 and 1 U on 9/25. Received cryoprecipitate 1 U on 9/24, and 9/25 for low fibrinogen.   - H/H stable   - F/U CBC, fibrinogen, PTT/INR  - Hem/Onc on board in regards the Lymphoma >> recommendations = Transfuse for fibrinogen <100, Hb <7, plt <10 or active bleeding, and daily tumor lysis workup   - Palliative care on board     Plan:  - F/U Hem/Onc recs      Infectious Disease:   - Afebrile  - WBC  WNL  - Currently on ceftriaxone 2g q24h for " SBP prophylaxis X total of 5 days         Endocrine: Hypothyroidism   - Glucose goal of 140 - 180 u  - SSI        Shiva Salinas MD  Critical Care - Surgery  [facility]

## 2019-09-26 NOTE — PROGRESS NOTES
[facility]  General Surgery  Progress Note    Subjective:     History of Present Illness:  No notes on file    Post-Op Info:  Procedure(s) (LRB):  EGD (ESOPHAGOGASTRODUODENOSCOPY) (N/A)   3 Days Post-Op     Interval History: Patient noted to have bowel eviscerating from midline incision during wound vac change this morning. Remains unresponsive.     Medications:  Continuous Infusions:   lactated ringers 100 mL/hr at 09/26/19 1100     Scheduled Meds:   albuterol-ipratropium  3 mL Nebulization Q4H WAKE    allopurinol  300 mg Per NG tube Daily    cefTRIAXone (ROCEPHIN) IVPB  1 g Intravenous Q24H    chlorhexidine  15 mL Mouth/Throat BID    levothyroxine  175 mcg Per NG tube Daily    pantoprazole  40 mg Intravenous BID    rifAXImin  550 mg Per NG tube BID     PRN Meds:sodium chloride, benzocaine, Dextrose 10% Bolus, Dextrose 10% Bolus, glucagon (human recombinant), insulin aspart U-100, lidocaine, ondansetron, promethazine (PHENERGAN) IVPB, sodium chloride 0.9%     Review of patient's allergies indicates:   Allergen Reactions    Aspirin (bulk) Other (See Comments)    Heparin analogues Other (See Comments)     Difficulty to arouse    Nsaids (non-steroidal anti-inflammatory drug)      Objective:     Vital Signs (Most Recent):  Temp: 98.2 °F (36.8 °C) (09/26/19 1100)  Pulse: 98 (09/26/19 1138)  Resp: 18 (09/26/19 1138)  BP: 120/60 (09/26/19 1100)  SpO2: 100 % (09/26/19 1138) Vital Signs (24h Range):  Temp:  [98 °F (36.7 °C)-99.1 °F (37.3 °C)] 98.2 °F (36.8 °C)  Pulse:  [] 98  Resp:  [16-27] 18  SpO2:  [92 %-100 %] 100 %  BP: (106-151)/(51-65) 120/60     Weight: (!) 138.1 kg (304 lb 7.3 oz)  Body mass index is 50.66 kg/m².    Intake/Output - Last 3 Shifts       09/24 0700 - 09/25 0659 09/25 0700 - 09/26 0659 09/26 0700 - 09/27 0659    I.V. (mL/kg) 236 (1.6) 1470 (10.6)     Blood  537.5     NG/GT  170 50    IV Piggyback 500      Total Intake(mL/kg) 736 (5.1) 2177.5 (15.8) 50 (0.4)    Urine  (mL/kg/hr) 745 (0.2) 1390 (0.4) 230 (0.3)    Drains  100     Other 150 100     Stool 300 150     Total Output 1195 1740 230    Net -459 +437.5 -180                 Physical Exam   Constitutional:   Intubated. Not on sedation. Minimal response to stimuli   HENT:   Head: Normocephalic and atraumatic.   Eyes:   Not opening eyes to painful stimulus   Cardiovascular: Normal rate.   Pulmonary/Chest:   Vent Mode: Spont  Oxygen Concentration (%):  (40) 40  Resp Rate Total:  (16 br/min-34 br/min) 21 br/min  Vt Set:  (400 mL) 400 mL  PEEP/CPAP:  (8 cmH20) 8 cmH20  Pressure Support:  (0 cmH20) 0 cmH20  Mean Airway Pressure:  (9.4 cmH20-9.6 cmH20) 9.5 cmH20     Abdominal: Soft. She exhibits no distension. There is no tenderness.   Neurological:   Minimal response to stimuli       Significant Labs:  CBC:   Recent Labs   Lab 09/26/19  0850   WBC 5.89   RBC 2.21*   HGB 7.4*   HCT 24.5*   PLT 49*   *   MCH 33.5*   MCHC 30.2*     CMP:   Recent Labs   Lab 09/26/19  0300      CALCIUM 8.0*   ALBUMIN 1.5*   PROT 4.5*   *   K 4.0   CO2 20*   *   BUN 87*   CREATININE 2.1*   ALKPHOS 83   ALT 34   AST 90*   BILITOT 6.8*     ABGs:   Recent Labs   Lab 09/24/19  0908   PH 7.345*   PCO2 38.8   PO2 105*   HCO3 21.2*   POCSATURATED 98   BE -5     Significant Diagnostics:  I have reviewed all pertinent imaging results/findings within the past 24 hours.    Assessment/Plan:     * Small bowel perforation  58F s/p 9/13 Ex-Lap and small bowel resection. Transferred to SICU early AM 9/23 for new GI bleed.    - Grim prognosis  - Staff to discuss with family  - Palliative care consult  - Comfort care      Acute GI bleeding  GI scoped pt, duodenal ulcer seen, bx sent    Acute confusional state  NGT once ETT placed, start lactulose per NGT, titrate to 3x bowel movements daily  Recent Labs     09/24/19  0228   AMMONIA 124*        Hypothyroidism  Levothyroxine         Lien Arango MD  General Surgery  [facility]

## 2019-09-26 NOTE — ASSESSMENT & PLAN NOTE
58F s/p 9/13 Ex-Lap and small bowel resection. Transferred to SICU early AM 9/23 for new GI bleed.    - Grim prognosis  - Staff to discuss with family  - Palliative care consult  - Comfort care

## 2019-09-26 NOTE — SUBJECTIVE & OBJECTIVE
Interval History: Patient noted to have bowel eviscerating from midline incision during wound vac change this morning. Remains unresponsive.     Medications:  Continuous Infusions:   lactated ringers 100 mL/hr at 09/26/19 1100     Scheduled Meds:   albuterol-ipratropium  3 mL Nebulization Q4H WAKE    allopurinol  300 mg Per NG tube Daily    cefTRIAXone (ROCEPHIN) IVPB  1 g Intravenous Q24H    chlorhexidine  15 mL Mouth/Throat BID    levothyroxine  175 mcg Per NG tube Daily    pantoprazole  40 mg Intravenous BID    rifAXImin  550 mg Per NG tube BID     PRN Meds:sodium chloride, benzocaine, Dextrose 10% Bolus, Dextrose 10% Bolus, glucagon (human recombinant), insulin aspart U-100, lidocaine, ondansetron, promethazine (PHENERGAN) IVPB, sodium chloride 0.9%     Review of patient's allergies indicates:   Allergen Reactions    Aspirin (bulk) Other (See Comments)    Heparin analogues Other (See Comments)     Difficulty to arouse    Nsaids (non-steroidal anti-inflammatory drug)      Objective:     Vital Signs (Most Recent):  Temp: 98.2 °F (36.8 °C) (09/26/19 1100)  Pulse: 98 (09/26/19 1138)  Resp: 18 (09/26/19 1138)  BP: 120/60 (09/26/19 1100)  SpO2: 100 % (09/26/19 1138) Vital Signs (24h Range):  Temp:  [98 °F (36.7 °C)-99.1 °F (37.3 °C)] 98.2 °F (36.8 °C)  Pulse:  [] 98  Resp:  [16-27] 18  SpO2:  [92 %-100 %] 100 %  BP: (106-151)/(51-65) 120/60     Weight: (!) 138.1 kg (304 lb 7.3 oz)  Body mass index is 50.66 kg/m².    Intake/Output - Last 3 Shifts       09/24 0700 - 09/25 0659 09/25 0700 - 09/26 0659 09/26 0700 - 09/27 0659    I.V. (mL/kg) 236 (1.6) 1470 (10.6)     Blood  537.5     NG/GT  170 50    IV Piggyback 500      Total Intake(mL/kg) 736 (5.1) 2177.5 (15.8) 50 (0.4)    Urine (mL/kg/hr) 745 (0.2) 1390 (0.4) 230 (0.3)    Drains  100     Other 150 100     Stool 300 150     Total Output 1195 1740 230    Net -459 +437.5 -180                 Physical Exam   Constitutional:   Intubated. Not on sedation.  Minimal response to stimuli   HENT:   Head: Normocephalic and atraumatic.   Eyes:   Not opening eyes to painful stimulus   Cardiovascular: Normal rate.   Pulmonary/Chest:   Vent Mode: Spont  Oxygen Concentration (%):  (40) 40  Resp Rate Total:  (16 br/min-34 br/min) 21 br/min  Vt Set:  (400 mL) 400 mL  PEEP/CPAP:  (8 cmH20) 8 cmH20  Pressure Support:  (0 cmH20) 0 cmH20  Mean Airway Pressure:  (9.4 cmH20-9.6 cmH20) 9.5 cmH20     Abdominal: Soft. She exhibits no distension. There is no tenderness.   Neurological:   Minimal response to stimuli       Significant Labs:  CBC:   Recent Labs   Lab 09/26/19  0850   WBC 5.89   RBC 2.21*   HGB 7.4*   HCT 24.5*   PLT 49*   *   MCH 33.5*   MCHC 30.2*     CMP:   Recent Labs   Lab 09/26/19  0300      CALCIUM 8.0*   ALBUMIN 1.5*   PROT 4.5*   *   K 4.0   CO2 20*   *   BUN 87*   CREATININE 2.1*   ALKPHOS 83   ALT 34   AST 90*   BILITOT 6.8*     ABGs:   Recent Labs   Lab 09/24/19  0908   PH 7.345*   PCO2 38.8   PO2 105*   HCO3 21.2*   POCSATURATED 98   BE -5     Significant Diagnostics:  I have reviewed all pertinent imaging results/findings within the past 24 hours.

## 2019-09-26 NOTE — PROGRESS NOTES
Larger amount of bowel protruding from Abd. than initially observed w/ ANA LUISA Wooten.  Bowel covered w/ wet gauze. MD Brad and CN notified.

## 2019-09-26 NOTE — PLAN OF CARE
Recommendations  Recommendation/Intervention:   1. As medically able, recommend modifying TF to Peptamen Intense VHP at a goal rate of 60 mL/hr - to provide 1440 kcal/day, 132g protein/day, and 1210mL free fluid/day.   2. If unable to advance TF, recommend initiating Custom TPN 135g AA and 150g Dextrose + IV lipids daily - to provide 1550 kcal/day and 135g protein/day.   RD to monitor.    Goals: Patient to receive nutrition by RD follow-up  Nutrition Goal Status: progressing towards goal    Full assessment completed, see RD Note 9/26/2019.

## 2019-09-26 NOTE — HPI
"HPI obtained from chart review.     Ms. Gupta is a 58 y.o. Lady with PMH of:  obesity, autoimmune hepatitis, cirrhosis, hypothyroidism, CKD stage 3, and S/P TIPS. Initially presented to  [facility] ED with c/o acute left-sided abdominal pain x1 day.  Pain was sudden, severe and nothing she had ever experienced. Occurred when she was sitting and eating. She also reported c/o  mild nausea, congestion and cough for about one week.  No c/o  vomiting, constipation or diarrhea. Has been listed for liver transplant since 2011, MELD 19.    CT at  At [facility] ED intraperitoneal free air noted. She was transferred to [facility] for  high acuity monitoring and perioperative management for possible surgical repair of suspected perforation. 9/13/19 -emergent exploratory lap, small bowel resection     Hospital/ICU Course:  9/13: Admit s/p small bowel resection for small bowel perforation. Extubated, HDS, not on pressors.   9/15: Started on CLD without issues. Passing flatus.   9/16: stable for step down. PICC line placed, patient lost IV access.  9/17: stable for step down. Lost PICC line overnight. New consult placed.  - 9/23 Pt was stepped up from the floor due to bleeding per rectum (melena/bright red). She is stable, her mental status got worse during the afternoon. Started on lactulose for high ammonia. EGD was done showed not active duodenal ulcer, and major source of bleeding was found. Placed on protonix/octerotide gtt and ceftriaxone (for SBP prophylaxis)   - 9/24 During day time Pt GCS 8 or below but she was satting well on NC. She got intubated for airway protection, and rectal tube placement. Pathology from the Small bowel excision results from 9/13/19 returned today revealing "Diffuse large B-cell lymphoma. Hem/Onc are on board   - 9/25 Pt still has GCS of 8, her ammonia levels are trending down. Breathing spontaneously on the vent   -9/26- wound care discovered evisceration through midline abdominal wound "     Palliative medicine consulted for end of life and hospice.               I

## 2019-09-26 NOTE — PROGRESS NOTES
Patient seen for wound care follow up for midline abdominal wound. Wound vac removed, evisceration/bowel noted. Dr. Arango came to bedside to evaluate, wet to dry kerlix applied. Patient pending palliative care/goals of care meeting. Nursing to change wet to dry packing daily, MD approved. Nursing to continue care. Wound care to assist as needed.    Midline abdominal incision

## 2019-09-26 NOTE — NURSING
"Dx: Small bowel perforation    Shift Events: 1 u cryo administered. 1u PRBC administered. LA @ 100 started. Trickle TF started at 10 cc/hr. Patient not following commands but opens eyes to voice. Palliative care discussion with family.    Neuro: Arouses to Voice and Moves All Extremities    Vital Signs: BP (!) 132/56 (BP Location: Left arm, Patient Position: Lying)   Pulse 109   Temp 98.6 °F (37 °C) (Oral)   Resp (!) 27   Ht 5' 5" (1.651 m)   Wt (!) 143.6 kg (316 lb 9.3 oz)   LMP  (LMP Unknown)   SpO2 100%   Breastfeeding? No   BMI 52.68 kg/m²     Diet: NPO and Tube Feeds    Gtts: LR @ 100    Urine Output: Urinary Catheter 620 cc/shift    Drains: Wound vac 50 cc/shift   NG output 100 cc/ shift    "

## 2019-09-26 NOTE — CONSULTS
[facility]  Palliative Medicine  Consult Note    Patient Name: Zaira Gupta  MRN: [id number]  Admission Date: 9/13/2019  Hospital Length of Stay: 13 days  Code Status: Full Code   Attending Provider: Zay Sánchez MD  Consulting Provider: JEFERSON Leigh  Primary Care Physician: Minna Canchola NP  Principal Problem:Small bowel perforation        Inpatient consult to Palliative Care  Consult performed by: JEFERSON Douglas  Consult ordered by: Lien Arango MD  Reason for consult: end of life, hospice   Assessment/Recommendations: Palliative medicine consult received. Chart reviewed and patient discussed with Dr. Almazan, Dr. Sánchez's team.   Palliative medicine APRN at bedside, no family present. Palliative medicine attempted to reach family by telephone.   2:50 PM Received telephone call from patient's sister Lacie.   Both she and the patient's daughter are in route to hospital and should arrive within the hour.  SICU resident notified of family's call.   Full consult pending.   Thank you for consult and opportunity to participate in Ms. Gupta's care.   Luisa Reilly, APRN, ACNS-BC, OCN   Palliative Medicine   Ext [phone]

## 2019-09-26 NOTE — ASSESSMENT & PLAN NOTE
Palliative medicine consulted by Dr. Sánchez for end of life hospice.    Palliative medicine met with patient at bedside.  Ms. Gupta intubated. No family present, will contact family by telephone.     Impression: Ms. Gupta is a 59 yo lady s/p exlap and SBO secondary to perforation.  Pathology positive for diffuse B-cell lymphoma -  limited treatment options and poor prognosis. Evisceration through midline abdominal wound. She is intubated PEEP 8 FiO2 40%. Encephalopathic - responds to voice, opening eyes, unable to follow commands. Appears comfortable with no facial grimacing or moaning.    More responsive when family present.      Advance Care Planning     - No advanced directives on file.  Ms. Gupta unable to complete advanced directives at this time.   - Next of kin for medical decisions- altaf Shaw [phone]  - Full code per primary tea         Goals of Care:   - family unavailable at time of initial assessment.  Contacted by telephone.  Return phone call received family will be arriving in early evening.     4:15 PM Family meeting with Dr. Sánchez, patient's daughter Antonio, sister Lacie, niece and Palliative Medicine APRN  - Dr. Sánchez provided patient with clinical updates - and emphasizing the poor prognosis and death.  - Palliative care explained appropriateness of comfort and hospice care if Ms. Moy is able to be extubated.   - Family has little knowledge and experience with hospice care.    - Patient's daughter is very distraught and appears overwhelmed by this information.  Patient's sister appears to have good understanding.   - Resuscitation status discussed. Risk and benefits of CPR provided.  Family is amenable to DNR orders      Plan/Recommendations  - Family amenable to DNR orders - to be written per primary team.   - Family would benefit from continued information and education regarding clinical condition and prognosis   - Palliative care will continue to follow and  assist family in developing realistic goals of care.    - If Mrs. Gupta is able to be extubated. Recommend general inpatient hosp[place] [facility]       .

## 2019-09-26 NOTE — SUBJECTIVE & OBJECTIVE
Interval History: eviscerated wound     Past Medical History:   Diagnosis Date    Acid reflux     Anemia     Arthritis     Autoimmune hepatitis 10/23/2012      Ref. Range 2012 09:38  BRANT Latest Range: Neg <1:160  Pos, Titer to follow (A)  BRANT HEP-2 Titer Latest Range: Neg <1:160 titer Pos 1:320 (A)  BRANT Hep-2 Pattern No range found Homogeneous (A)  Anti-SSA Antibody Latest Range: <20 EU 1.68  Anti-SSA Interpretation Latest Range: Negative  Negative  Anti-SSB Antibody Latest Range: <20 EU 1.76  Anti-SSB Interpretation Latest Range: Negative  Negativ    Cirrhosis     Dry mouth     Esophageal varix bleeding     Hypothyroidism     Obesity     Thrombocytopenia        Past Surgical History:   Procedure Laterality Date    BLADDER SURGERY  in      SECTION, CLASSIC      DILATION AND CURETTAGE OF UTERUS   MAB    ESOPHAGOGASTRODUODENOSCOPY N/A 2019    Procedure: EGD (ESOPHAGOGASTRODUODENOSCOPY);  Surgeon: Silverio Tran MD;  Location: [address]);  Service: Endoscopy;  Laterality: N/A;    TIPS PROCEDURE      TIPS revision  2012    TONSILLECTOMY, ADENOIDECTOMY         Review of patient's allergies indicates:   Allergen Reactions    Aspirin (bulk) Other (See Comments)    Heparin analogues Other (See Comments)     Difficulty to arouse    Nsaids (non-steroidal anti-inflammatory drug)        Medications:  Continuous Infusions:   lactated ringers 100 mL/hr at 19 1500     Scheduled Meds:   albuterol-ipratropium  3 mL Nebulization Q4H WAKE    allopurinol  300 mg Per NG tube Daily    cefTRIAXone (ROCEPHIN) IVPB  1 g Intravenous Q24H    chlorhexidine  15 mL Mouth/Throat BID    levothyroxine  175 mcg Per NG tube Daily    pantoprazole  40 mg Intravenous BID    rifAXImin  550 mg Per NG tube BID     PRN Meds:sodium chloride, benzocaine, Dextrose 10% Bolus, Dextrose 10% Bolus, glucagon (human recombinant), insulin aspart U-100, lidocaine, ondansetron,  promethazine (PHENERGAN) IVPB, sodium chloride 0.9%    Family History     Problem Relation (Age of Onset)    Arthritis Mother    Diabetes Mother, Sister, Sister    Hypertension Sister        Tobacco Use    Smoking status: Former Smoker     Packs/day: 0.10     Types: Cigarettes    Smokeless tobacco: Never Used   Substance and Sexual Activity    Alcohol use: No     Alcohol/week: 0.0 standard drinks    Drug use: No    Sexual activity: Never       Review of Systems   Unable to perform ROS: Intubated     Objective:     Vital Signs (Most Recent):  Temp: 98.2 °F (36.8 °C) (09/26/19 1500)  Pulse: 107 (09/26/19 1536)  Resp: (!) 22 (09/26/19 1536)  BP: 131/62 (09/26/19 1500)  SpO2: 100 % (09/26/19 1536) Vital Signs (24h Range):  Temp:  [98 °F (36.7 °C)-98.5 °F (36.9 °C)] 98.2 °F (36.8 °C)  Pulse:  [] 107  Resp:  [16-28] 22  SpO2:  [96 %-100 %] 100 %  BP: (106-151)/(53-65) 131/62     Weight: (!) 138.1 kg (304 lb 7.3 oz)  Body mass index is 50.66 kg/m².    Review of Symptoms  Symptom Assessment (ESAS 0-10 scale)   ESAS 0 1 2 3 4 5 6 7 8 9 10   Pain              Dyspnea              Anxiety              Nausea              Depression               Anorexia              Fatigue              Insomnia              Restlessness               Agitation              CAM / Delirium __ --  ___+   Constipation     __ --  ___+   Diarrhea           __ --  ___+  Bowel Management Plan (BMP): No    Comments: patient is intubated, unable to complete ESAS    Pain Assessment: unable to verbalize, appears comfortable, no facial grimacing or moaning   OME in 24 hours: 0    Performance Status: 10    ECOG Performance Status Grade: 4 - Completely disabled    Physical Exam   Constitutional: No distress.   Obese, appears critically ill, intubated    HENT:   Ng tube to r nare   Neck: Thyromegaly present.   Cardiovascular: Regular rhythm and normal heart sounds.   tachycardia   Pulmonary/Chest:   Intubated PEEP 8 FiO2 40%   Abdominal:   Large  abdominal wound with wound vac.  Evisceration noted with wound vac dressing change     Neurological:   Encephalopathic, unresponsive    Skin: Skin is warm and dry. There is pallor.   Psychiatric:   Unable to assess    Nursing note and vitals reviewed.      Significant Labs: All pertinent labs within the past 24 hours have been reviewed.  CBC:   Recent Labs   Lab 09/26/19  0850   WBC 5.89   HGB 7.4*   HCT 24.5*   *   PLT 49*     BMP:  Recent Labs   Lab 09/26/19  0300      *   K 4.0   *   CO2 20*   BUN 87*   CREATININE 2.1*   CALCIUM 8.0*   MG 2.3     LFT:  Lab Results   Component Value Date    AST 90 (H) 09/26/2019    GGT 30 02/06/2012    ALKPHOS 83 09/26/2019    BILITOT 6.8 (H) 09/26/2019     Albumin:   Albumin   Date Value Ref Range Status   09/26/2019 1.5 (L) 3.5 - 5.2 g/dL Final     Protein:   Total Protein   Date Value Ref Range Status   09/26/2019 4.5 (L) 6.0 - 8.4 g/dL Final     Lactic acid:   Lab Results   Component Value Date    LACTATE 3.2 (H) 09/16/2019    LACTATE 6.0 (HH) 09/15/2019       Significant Imaging: I have reviewed all pertinent imaging results/findings within the past 24 hours.    Advance Care Planning   Advanced Directives::  Living Will: No  LaPOST: No  Do Not Resuscitate Status: No  Medical Power of : No    Decision-Making Capacity: Patient unable to communicate due to disease severity/cognitive impairment       Living Arrangements: Lives with family    Psychosocial/Cultural: not , one daughter, lives with and is care taker for disabled mother and sister AIDEE    Spiritual:     F- Ronit and Belief: Mormon     I - Importance:   .  C - Community:     A - Address in Care:

## 2019-09-26 NOTE — PROGRESS NOTES
"[facility]  Adult Nutrition  Progress Note    SUMMARY       Recommendations  Recommendation/Intervention:   1. As medically able, recommend modifying TF to Peptamen Intense VHP at a goal rate of 60 mL/hr - to provide 1440 kcal/day, 132g protein/day, and 1210mL free fluid/day.   2. If unable to advance TF, recommend initiating Custom TPN 135g AA and 150g Dextrose + IV lipids daily - to provide 1550 kcal/day and 135g protein/day.   RD to monitor.    Goals: Patient to receive nutrition by RD follow-up  Nutrition Goal Status: progressing towards goal  Communication of RD Recs: discussed on rounds    Reason for Assessment  Reason For Assessment: RD follow-up  Diagnosis: surgery/postoperative complications(small bowel perforation s/p surgery 9/13)  Relevant Medical History: AIH, CKD3  Interdisciplinary Rounds: attended  General Information Comments: Intubated 9/24. Trickle TF started yesterday. Physical exam remains unchanged, continues to appear nourished.  Nutrition Discharge Planning: Unable to determine at this time.    Nutrition Risk Screen  Nutrition Risk Screen: dysphagia or difficulty swallowing    Nutrition/Diet History  Spiritual, Cultural Beliefs, Scientology Practices, Values that Affect Care: no(Simultaneous filing. User may not have seen previous data.)  Factors Affecting Nutritional Intake: NPO, on mechanical ventilation    Anthropometrics  Temp: 98.2 °F (36.8 °C)  Height Method: Stated  Height: 5' 5" (165.1 cm)  Height (inches): 65 in  Weight Method: Bed Scale  Weight: (!) 138.1 kg (304 lb 7.3 oz)  Weight (lb): (!) 304.46 lb  Ideal Body Weight (IBW), Female: 125 lb  % Ideal Body Weight, Female (lb): 213.94 lb  BMI (Calculated): 44.6  BMI Grade: greater than 40 - morbid obesity    Lab/Procedures/Meds  Pertinent Labs Reviewed: reviewed  Pertinent Labs Comments: Na 147, Cl 113, BUN 87, Ca 8.0, Alb 1.5, T. bili 6.8  Pertinent Medications Reviewed: reviewed  Pertinent Medications Comments: " pantoprazole    Estimated/Assessed Needs  Weight Used For Calorie Calculations: 121.3 kg (267 lb 6.7 oz)(admit weight)  Energy Calorie Requirements (kcal): 1698-6148 kcal/day  Energy Need Method: Kcal/kg(11-14)  Protein Requirements: 114-142 g/day(2.0-2.5 g/kg)  Weight Used For Protein Calculations: 56.8 kg (125 lb 3.5 oz)(IBW)  Fluid Requirements (mL): 1 mL/kcal or per MD  Estimated Fluid Requirement Method: RDA Method  RDA Method (mL): 1334    Nutrition Prescription Ordered  Current Diet Order: NPO  Current Nutrition Support Formula Ordered: Peptamen 1.5 w/Prebio  Current Nutrition Support Rate Ordered: 10 (ml)  Current Nutrition Support Frequency Ordered: mL/hr    Evaluation of Received Nutrient/Fluid Intake  Enteral Calories (kcal): 360  Enteral Protein (gm): 16  Enteral (Free Water) Fluid (mL): 184  % Kcal Needs: 21%  % Protein Needs: 14%  I/O: +12.5L since admit  Energy Calories Required: not meeting needs  Protein Required: not meeting needs  Fluid Required: (per MD)  Comments: LBM 9/26  Tolerance: tolerating  % Intake of Estimated Energy Needs: 0 - 25 %  % Meal Intake: NPO    Nutrition Risk  Level of Risk/Frequency of Follow-up: high(2x/week)     Assessment and Plan  Nutrition Problem  Inadequate energy intake     Related to (etiology):   Decreased ability to consume sufficient energy     Signs and Symptoms (as evidenced by):   NPO with no alternative means of nutrition at this time     Interventions (treatment strategy):  Collaboration of nutrition care with other providers     Nutrition Diagnosis Status:   Continues - on trickle TF    Monitor and Evaluation  Food and Nutrient Intake: energy intake  Food and Nutrient Adminstration: diet order, enteral and parenteral nutrition administration  Anthropometric Measurements: weight, weight change  Biochemical Data, Medical Tests and Procedures: electrolyte and renal panel, gastrointestinal profile, inflammatory profile  Nutrition-Focused Physical Findings:  overall appearance     Nutrition Follow-Up  RD Follow-up?: Yes

## 2019-09-27 PROBLEM — G93.40 ENCEPHALOPATHY ACUTE: Status: ACTIVE | Noted: 2019-01-01

## 2019-09-27 PROBLEM — C83.30 DIFFUSE LARGE B CELL LYMPHOMA: Status: ACTIVE | Noted: 2019-01-01

## 2019-09-27 NOTE — PLAN OF CARE
No acute events. VSS. Pt afebrile. NSR to ST throughout shift maintaining MAPs>65 per order. Pt continues to follow commands in all extremities. Pt on spontaneous, 40%, PEEP 8, tolerating well, sats >95%. Pt currently has LR @ 100mL/hr. UOP > 30mL/hr. New wound vac placed to abdominal incision per Dr. Dickerson with 250mL serosanguineous output this shift. No new skin breakdown noted throughout shift. Plan of care reviewed with patient and family. Questions and concerns addressed. Pt remained free from falls and injury throughout shift. Will continue to monitor.

## 2019-09-27 NOTE — ASSESSMENT & PLAN NOTE
58F s/p 9/13 Ex-Lap and small bowel resection. Transferred to SICU early AM 9/23 for new GI bleed.    - Grim prognosis  - Staff to discuss with family this am  - Palliative care consult in  - Comfort care

## 2019-09-27 NOTE — PROGRESS NOTES
"[facility]  Critical Care - Surgery  Progress Note    Patient Name: Zaira Gupta  MRN: [id number]  Admission Date: 9/13/2019  Hospital Length of Stay: 14 days  Code Status: DNR  Attending Provider: Zay Sánchez MD  Primary Care Provider: Minna Canchola NP   Principal Problem: Small bowel perforation    Subjective:     Hospital/ICU Course:  9/13: Admit s/p small bowel resection for small bowel perforation. Extubated, HDS, not on pressors.   9/15: Started on CLD without issues. Passing flatus.   9/16: stable for step down. PICC line placed, patient lost IV access.  9/17: stable for step down. Lost PICC line overnight. New consult placed.  - 9/23 Pt was stepped up from the floor due to bleeding per rectum (melena/bright red). She is stable, her mental status got worse during the afternoon. Started on lactulose for high ammonia. EGD was done showed not active duodenal ulcer, and major source of bleeding was found. Placed on protonix/octerotide gtt and ceftriaxone (for SBP prophylaxis)   - 9/24 During day time Pt GCS 8 or below but she was satting well on NC. She got intubated for airway protection, and rectal tube placement. Pathology from the Small bowel excision results from 9/13/19 returned today revealing "Diffuse large B-cell lymphoma. Hem/Onc are on board   - 9/25 Pt still has GCS of 8, her ammonia levels are trending down. Breathing spontaneously on the vent   - 9/26 Today Pt was opening eyes and following commands, but very weak. Hem/Onc on board. Uric acid levels are high >> started on allopurinol. Cr today is 2.1. Family talk with ed about comfort care, and DNR form has been singed     Interval History/Significant Events: No acute events overnight. Goals of care with family done yesterday, DNR form signed and comfort care is the plan     Follow-up For: Procedure(s) (LRB):  EGD (ESOPHAGOGASTRODUODENOSCOPY) (N/A)    Post-Operative Day: 4 Days Post-Op    Objective:     Vital Signs " (Most Recent):  Temp: 98.8 °F (37.1 °C) (09/27/19 0700)  Pulse: 96 (09/27/19 1000)  Resp: 17 (09/27/19 1000)  BP: (!) 113/53 (09/27/19 1000)  SpO2: 100 % (09/27/19 1000) Vital Signs (24h Range):  Temp:  [98.2 °F (36.8 °C)-98.8 °F (37.1 °C)] 98.8 °F (37.1 °C)  Pulse:  [] 96  Resp:  [14-30] 17  SpO2:  [98 %-100 %] 100 %  BP: ()/() 113/53     Weight: (!) 137.9 kg (304 lb 0.2 oz)  Body mass index is 50.59 kg/m².      Intake/Output Summary (Last 24 hours) at 9/27/2019 1106  Last data filed at 9/27/2019 1000  Gross per 24 hour   Intake 2575 ml   Output 1705 ml   Net 870 ml       Physical Exam   Constitutional: She appears well-developed and well-nourished. No distress.   Eyes: Conjunctivae are normal. Right eye exhibits no discharge. Left eye exhibits no discharge. No scleral icterus.   Cardiovascular: Normal rate and regular rhythm. Exam reveals no friction rub.   No murmur heard.  Pulmonary/Chest: Effort normal. No stridor. No respiratory distress. She has no wheezes. She has rales.   Intubated    Abdominal: Soft. Bowel sounds are normal. She exhibits no distension. There is no tenderness. There is no guarding.   Musculoskeletal: She exhibits no edema, tenderness or deformity.   Neurological:   On sedation    Skin: She is not diaphoretic.       Vents:  Vent Mode: PSV (09/27/19 0902)  Set Rate: 30 bmp (09/26/19 2010)  Vt Set: 0 mL (09/26/19 1732)  Pressure Support: 0 cmH20 (09/27/19 0902)  PEEP/CPAP: 8 cmH20 (09/27/19 0902)  Oxygen Concentration (%): 40 (09/27/19 0804)  Peak Airway Pressure: 10 cmH2O (09/27/19 0902)  Plateau Pressure: 0 cmH20 (09/26/19 1732)  Total Ve: 7.41 mL (09/27/19 0902)  F/VT Ratio<105 (RSBI): (!) 36.12 (09/27/19 0902)    Lines/Drains/Airways     Central Venous Catheter Line                 Percutaneous Central Line Insertion/Assessment - triple lumen  09/23/19 0300 right internal jugular 4 days          Drain                 Urethral Catheter 09/23/19 0130 Double-lumen 4 days          Rectal Tube 09/24/19 0700 fecal management system 3 days         NG/OG Tube 09/24/19 1200 Right nostril 2 days          Airway                 Airway - Non-Surgical 09/24/19 0739 Endotracheal Tube 3 days          Peripheral Intravenous Line                 Midline Catheter Insertion/Assessment  - Single Lumen 09/16/19 1032 Right brachial vein 18g x 10cm 11 days                Significant Labs:    CBC/Anemia Profile:  Recent Labs   Lab 09/26/19  2041 09/27/19  0300 09/27/19  0909   WBC 5.14 6.27 5.63   HGB 7.9* 7.5* 7.8*   HCT 24.8* 25.5* 25.3*   PLT 48* 49* 45*   * 112* 111*   RDW 24.9* 25.5* 26.4*        Chemistries:  Recent Labs   Lab 09/26/19  0300 09/27/19  0300   * 147*   K 4.0 4.0   * 114*   CO2 20* 20*   BUN 87* 84*   CREATININE 2.1* 2.0*   CALCIUM 8.0* 8.1*   ALBUMIN 1.5* 1.5*   PROT 4.5* 4.6*   BILITOT 6.8* 6.6*   ALKPHOS 83 89   ALT 34 41   AST 90* 113*   MG 2.3 2.5   PHOS 4.3 3.5       All pertinent labs within the past 24 hours have been reviewed.    Significant Imaging:  I have reviewed all pertinent imaging results/findings within the past 24 hours.    Assessment/Plan:     * Small bowel perforation  Ms. Gupta is a 57 y/o lady with a known case of Alcohol induced hepatitis/chirrosis with splenomegaly, Portal HTN (S/P TIPS on 202), Reflex esophagitis, CKD 3 and hypothyroidism. Pt was previously stepped down from SICU  After she has EX-LAP for bowel perforation on 9/13/2019. She got stepped up from the floor due to blood/melena per rectum. Pathology report from bowel resection biopsy showed diffuse B cell lymphoma (with poor prognosis) based on Hem/Onc. As mentioned by hem/Onc that the intra-luminal lymphoma might had been the cause of the perforation, and even with starting chemo the Pt might have additional perforations     Neuro:   - Sedation: Currently off propofol   - On 9/24 got intubated for airway protection (due to severe altered mental status)  - Etiology of AMS most  "likely due to high ammonia levels (hepatic encephalopathy) >> Pt today alert, and following commands (not consistent)   - Previously on PO/Rectal lactulose >> switched to rifaximin >> currently D/C    Plan:  - D/C all meds       Pulmonary:   - Currently intubated on 9/24 for airway protection due to low GCS    Plan:  - Extubation and switch to comfort care (possible inpatient hospice)       Cardiac:   - @ home he takes spironolactone 100 mg, and lasix 40 mg   - Hemodynamic stable  - Not on pressors nor drips   - No ECHO on chart         Renal: Known case of CKD 3  - Weinberg in place  - Monitor UOP  - on 9/25 Cr bumped up >> GENIE most likely due to pre-renal cause Vs. Elevated uric acid Vs. Hepatorenal picture   - On IVF maintenance   - On allopurinol for possible tumor lysis syndrome Vs. Gout      Fluids/Electrolytes/Nutrition/GI: Alcohol induced hepatitis/chirrosis with splenomegaly, Portal HTN (S/P TIPS on 202), Reflex esophagitis. S/P Bowel perforation and underwent EX LAP on 9/13   - NGT placed 9/24   - Rectal tube placed on 9/24   - Previously on Protonix and octreotide gtt. Currently on pantoprazole 40  mg BID  - Nutritional status: On Trickle feeds 10 cc/hr  - replace lytes PRN       Hematology/Oncology: Small bowel excision biopsy results from 9/13/19 returned today revealing "Diffuse large B-cell lymphoma  - H/H has been stable since she received 2 U PRBC on 9/23 and 1 U on 9/25. Received cryoprecipitate 1 U on 9/24, and 9/25 for low fibrinogen.   - H/H stable   - Hem/Onc on board in regards the Lymphoma >> recommendations = Transfuse for fibrinogen <100, Hb <7, plt <10 or active bleeding, and daily tumor lysis workup   - Palliative care on board and plan for inpatient hospice     Plan:  - F/U Hem/Onc recs  - Consult inpatient hospice       Infectious Disease:   - Afebrile  - WBC  WNL  - D/C ceftriaxone (for SBP prophylaxis )        Endocrine: Hypothyroidism   - Glucose goal of 140 - 180 u  - SSI    Dispo: " transfer Inpatient hospice for comfort care            Shiva Salinas MD  Critical Care - Surgery  [facility]

## 2019-09-27 NOTE — SUBJECTIVE & OBJECTIVE
Interval History: Patient noted to have bowel eviscerating from midline incision during wound vac yesterday. Wound vac replaced by SI team. No further acute events overnight. Family presented at bedside overnight; questions addressed by nursing staff; will returning to talk with physician team. Still in NSR to ST throughout overnight shift. UOP > 30 ml/hr.    Medications:  Continuous Infusions:   lactated ringers 100 mL/hr at 09/27/19 0600     Scheduled Meds:   albuterol-ipratropium  3 mL Nebulization Q4H WAKE    allopurinol  300 mg Per NG tube Daily    cefTRIAXone (ROCEPHIN) IVPB  1 g Intravenous Q24H    chlorhexidine  15 mL Mouth/Throat BID    levothyroxine  175 mcg Per NG tube Daily    pantoprazole  40 mg Intravenous BID    rifAXImin  550 mg Per NG tube BID     PRN Meds:sodium chloride, benzocaine, Dextrose 10% Bolus, Dextrose 10% Bolus, glucagon (human recombinant), insulin aspart U-100, lidocaine, ondansetron, promethazine (PHENERGAN) IVPB, sodium chloride 0.9%     Review of patient's allergies indicates:   Allergen Reactions    Aspirin (bulk) Other (See Comments)    Heparin analogues Other (See Comments)     Difficulty to arouse    Nsaids (non-steroidal anti-inflammatory drug)      Objective:     Vital Signs (Most Recent):  Temp: 98.6 °F (37 °C) (09/27/19 0300)  Pulse: 94 (09/27/19 0645)  Resp: 17 (09/27/19 0645)  BP: (!) 111/56 (09/27/19 0645)  SpO2: 99 % (09/27/19 0645) Vital Signs (24h Range):  Temp:  [98.2 °F (36.8 °C)-98.7 °F (37.1 °C)] 98.6 °F (37 °C)  Pulse:  [] 94  Resp:  [15-30] 17  SpO2:  [98 %-100 %] 99 %  BP: ()/() 111/56     Weight: (!) 137.9 kg (304 lb 0.2 oz)  Body mass index is 50.59 kg/m².    Intake/Output - Last 3 Shifts       09/25 0700 - 09/26 0659 09/26 0700 - 09/27 0659 09/27 0700 - 09/28 0659    I.V. (mL/kg) 1470 (10.6) 2345 (17)     Blood 537.5      NG/ 240     IV Piggyback       Total Intake(mL/kg) 2177.5 (15.8) 2585 (18.7)     Urine (mL/kg/hr) 1390  (0.4) 1495 (0.5)     Drains 100      Other 100 250     Stool 150 50     Total Output 1740 1795     Net +437.5 +790                  Physical Exam   Constitutional:   Intubated. Not on sedation. Minimal response to stimuli   HENT:   Head: Normocephalic and atraumatic.   Eyes:   Not opening eyes to painful stimulus   Cardiovascular: Normal rate.   Pulmonary/Chest:   Vent Mode: Spont  Oxygen Concentration (%):  (40) 40  Resp Rate Total:  (16 br/min-34 br/min) 21 br/min  Vt Set:  (400 mL) 400 mL  PEEP/CPAP:  (8 cmH20) 8 cmH20  Pressure Support:  (0 cmH20) 0 cmH20  Mean Airway Pressure:  (9.4 cmH20-9.6 cmH20) 9.5 cmH20     Abdominal: Soft. She exhibits no distension. There is no tenderness.   Neurological:   Minimal response to stimuli       Significant Labs:  CBC:   Recent Labs   Lab 09/27/19  0300   WBC 6.27   RBC 2.28*   HGB 7.5*   HCT 25.5*   PLT 49*   *   MCH 32.9*   MCHC 29.4*     CMP:   Recent Labs   Lab 09/27/19  0300      CALCIUM 8.1*   ALBUMIN 1.5*   PROT 4.6*   *   K 4.0   CO2 20*   *   BUN 84*   CREATININE 2.0*   ALKPHOS 89   ALT 41   *   BILITOT 6.6*     ABGs:   Recent Labs   Lab 09/24/19  0908   PH 7.345*   PCO2 38.8   PO2 105*   HCO3 21.2*   POCSATURATED 98   BE -5     Significant Diagnostics:  I have reviewed all pertinent imaging results/findings within the past 24 hours.

## 2019-09-27 NOTE — CONSULTS
[facility]  Palliative Medicine  Consult Note    Patient Name: Zaira Gupta  MRN: [id number]  Admission Date: 9/13/2019  Hospital Length of Stay: 14 days  Code Status: DNR   Attending Provider: Zay Sánchez MD  Consulting Provider: JEFERSON Leigh  Primary Care Physician: Minna Canchola NP  Principal Problem:Small bowel perforation    Patient information was obtained from relative(s), past medical records and ER records.      Consults  Assessment/Plan:     Palliative care encounter  Palliative medicine consulted by Dr. Sánchez for end of life hospice.    Palliative medicine met with patient at bedside.  Ms. Gupta intubated. No family present, will contact family by telephone.     Impression: Ms. Gupta is a 59 yo lady s/p exlap and SBO secondary to perforation.  Pathology positive for diffuse B-cell lymphoma -  limited treatment options and poor prognosis. Evisceration through midline abdominal wound. She is intubated PEEP 8 FiO2 40%. Encephalopathic - responds to voice, opening eyes, unable to follow commands. Appears comfortable with no facial grimacing or moaning.    More responsive when family present.      Advance Care Planning     - No advanced directives on file.  Ms. Gupta unable to complete advanced directives at this time.   - Next of kin for medical decisions- daughter  Maddie Shaw [phone]  - Full code per primary tea        Goals of Care:   - family unavailable at time of initial assessment.  Contacted by telephone.  Return phone call received family will be arriving in early evening.     4:15 PM Family meeting with Dr. Sánchez, patient's daughter Antonio, sister Lacie, niece and Palliative Medicine APRN  - Dr. Sánchez provided patient with clinical updates - and emphasizing the poor prognosis and death.  - Palliative care explained appropriateness of comfort and hospice care if Ms. Moy is able to be extubated.   - Family has little knowledge and  experience with hospice care.    - Patient's daughter is very distraught and appears overwhelmed by this information.  Patient's sister appears to have good understanding.   - Resuscitation status discussed. Risk and benefits of CPR provided.  Family is amenable to DNR orders      Plan/Recommendations  - Family amenable to DNR orders - to be written per primary team.   - Family would benefit from continued information and education regarding clinical condition and prognosis   - Palliative care will continue to follow and assist family in developing realistic goals of care.    - If Mrs. Gupta is able to be extubated. Recommend general inpatient hospice here at [facility].       .         Thank you for your consult. I will follow-up with patient. Please contact us if you have any additional questions.    Subjective:     HPI:   HPI obtained from chart review.     Ms. Gupta is a 58 y.o. Lady with PMH of:  obesity, autoimmune hepatitis, cirrhosis, hypothyroidism, CKD stage 3, and S/P TIPS . Initially presented to  [facility] ED with c/o acute left-sided abdominal pain x1 day.  Pain was sudden, severe and nothing she had ever experienced. Occurred when she was sitting and eating. She also reported c/o  mild nausea, congestion and cough for about one week.  No c/o  vomiting, constipation or diarrhea. Has been listed for liver transplant since 2011, MELD 19.    CT at  At [facility] ED intraperitoneal free air noted. [facility]  high acuity monitoring and perioperative management for possible surgical repair of suspected perforation.  9/13/19 -emergent exploratory lap, small bowel resection     Hospital/ICU Course:  9/13: Admit s/p small bowel resection for small bowel perforation. Extubated, HDS, not on pressors.   9/15: Started on CLD without issues. Passing flatus.   9/16: stable for step down. PICC line placed, patient lost IV access.  9/17: stable for step down. Lost PICC line overnight. New consult  "placed.  -  Pt was stepped up from the floor due to bleeding per rectum (melena/bright red). She is stable, her mental status got worse during the afternoon. Started on lactulose for high ammonia. EGD was done showed not active duodenal ulcer, and major source of bleeding was found. Placed on protonix/octerotide gtt and ceftriaxone (for SBP prophylaxis)   -  During day time Pt GCS 8 or below but she was satting well on NC. She got intubated for airway protection, and rectal tube placement. Pathology from the Small bowel excision results from 19 returned today revealing "Diffuse large B-cell lymphoma. Hem/Onc are on board   -  Pt still has GCS of 8, her ammonia levels are trending down. Breathing spontaneously on the vent   -- wound care discovered evisceration through midline abdominal wound     Palliative medicine consulted for end of life and hospice.               I    Hospital Course:  No notes on file    Interval History: eviscerated wound     Past Medical History:   Diagnosis Date    Acid reflux     Anemia     Arthritis     Autoimmune hepatitis 10/23/2012      Ref. Range 2012 09:38  BRANT Latest Range: Neg <1:160  Pos, Titer to follow (A)  BRANT HEP-2 Titer Latest Range: Neg <1:160 titer Pos 1:320 (A)  BRANT Hep-2 Pattern No range found Homogeneous (A)  Anti-SSA Antibody Latest Range: <20 EU 1.68  Anti-SSA Interpretation Latest Range: Negative  Negative  Anti-SSB Antibody Latest Range: <20 EU 1.76  Anti-SSB Interpretation Latest Range: Negative  Negativ    Cirrhosis     Dry mouth     Esophageal varix bleeding     Hypothyroidism     Obesity     Thrombocytopenia        Past Surgical History:   Procedure Laterality Date    BLADDER SURGERY  in      SECTION, CLASSIC      DILATION AND CURETTAGE OF UTERUS      22 MAB    ESOPHAGOGASTRODUODENOSCOPY N/A 2019    Procedure: EGD (ESOPHAGOGASTRODUODENOSCOPY);  Surgeon: Silverio Tran MD;  Location: [facility] ( [facility]);  Service: Endoscopy;  Laterality: N/A;    TIPS PROCEDURE      TIPS revision  7/5/2012    TONSILLECTOMY, ADENOIDECTOMY         Review of patient's allergies indicates:   Allergen Reactions    Aspirin (bulk) Other (See Comments)    Heparin analogues Other (See Comments)     Difficulty to arouse    Nsaids (non-steroidal anti-inflammatory drug)        Medications:  Continuous Infusions:   lactated ringers 100 mL/hr at 09/26/19 1500     Scheduled Meds:   albuterol-ipratropium  3 mL Nebulization Q4H WAKE    allopurinol  300 mg Per NG tube Daily    cefTRIAXone (ROCEPHIN) IVPB  1 g Intravenous Q24H    chlorhexidine  15 mL Mouth/Throat BID    levothyroxine  175 mcg Per NG tube Daily    pantoprazole  40 mg Intravenous BID    rifAXImin  550 mg Per NG tube BID     PRN Meds:sodium chloride, benzocaine, Dextrose 10% Bolus, Dextrose 10% Bolus, glucagon (human recombinant), insulin aspart U-100, lidocaine, ondansetron, promethazine (PHENERGAN) IVPB, sodium chloride 0.9%    Family History     Problem Relation (Age of Onset)    Arthritis Mother    Diabetes Mother, Sister, Sister    Hypertension Sister        Tobacco Use    Smoking status: Former Smoker     Packs/day: 0.10     Types: Cigarettes    Smokeless tobacco: Never Used   Substance and Sexual Activity    Alcohol use: No     Alcohol/week: 0.0 standard drinks    Drug use: No    Sexual activity: Never       Review of Systems   Unable to perform ROS: Intubated     Objective:     Vital Signs (Most Recent):  Temp: 98.2 °F (36.8 °C) (09/26/19 1500)  Pulse: 107 (09/26/19 1536)  Resp: (!) 22 (09/26/19 1536)  BP: 131/62 (09/26/19 1500)  SpO2: 100 % (09/26/19 1536) Vital Signs (24h Range):  Temp:  [98 °F (36.7 °C)-98.5 °F (36.9 °C)] 98.2 °F (36.8 °C)  Pulse:  [] 107  Resp:  [16-28] 22  SpO2:  [96 %-100 %] 100 %  BP: (106-151)/(53-65) 131/62     Weight: (!) 138.1 kg (304 lb 7.3 oz)  Body mass index is 50.66 kg/m².    Review of Symptoms  Symptom Assessment  (ESAS 0-10 scale)   ESAS 0 1 2 3 4 5 6 7 8 9 10   Pain              Dyspnea              Anxiety              Nausea              Depression               Anorexia              Fatigue              Insomnia              Restlessness               Agitation              CAM / Delirium __ --  ___+   Constipation     __ --  ___+   Diarrhea           __ --  ___+  Bowel Management Plan (BMP): No    Comments: patient is intubated, unable to complete ESAS    Pain Assessment: unable to verbalize, appears comfortable, no facial grimacing or moaning   OME in 24 hours: 0    Performance Status: 10    ECOG Performance Status Grade: 4 - Completely disabled    Physical Exam   Constitutional: No distress.   Obese, appears critically ill, intubated    HENT:   Ng tube to r nare   Neck: Thyromegaly present.   Cardiovascular: Regular rhythm and normal heart sounds.   tachycardia   Pulmonary/Chest:   Intubated PEEP 8 FiO2 40%   Abdominal:   Large abdominal wound with wound vac.  Evisceration noted with wound vac dressing change     Neurological:   Encephalopathic, unresponsive    Skin: Skin is warm and dry. There is pallor.   Psychiatric:   Unable to assess    Nursing note and vitals reviewed.      Significant Labs: All pertinent labs within the past 24 hours have been reviewed.  CBC:   Recent Labs   Lab 09/26/19  0850   WBC 5.89   HGB 7.4*   HCT 24.5*   *   PLT 49*     BMP:  Recent Labs   Lab 09/26/19  0300      *   K 4.0   *   CO2 20*   BUN 87*   CREATININE 2.1*   CALCIUM 8.0*   MG 2.3     LFT:  Lab Results   Component Value Date    AST 90 (H) 09/26/2019    GGT 30 02/06/2012    ALKPHOS 83 09/26/2019    BILITOT 6.8 (H) 09/26/2019     Albumin:   Albumin   Date Value Ref Range Status   09/26/2019 1.5 (L) 3.5 - 5.2 g/dL Final     Protein:   Total Protein   Date Value Ref Range Status   09/26/2019 4.5 (L) 6.0 - 8.4 g/dL Final     Lactic acid:   Lab Results   Component Value Date    LACTATE 3.2 (H) 09/16/2019     LACTATE 6.0 () 09/15/2019       Significant Imaging: I have reviewed all pertinent imaging results/findings within the past 24 hours.    Advance Care Planning   Advanced Directives::  Living Will: No  LaPOST: No  Do Not Resuscitate Status: No  Medical Power of : No    Decision-Making Capacity: Patient unable to communicate due to disease severity/cognitive impairment       Living Arrangements: Lives with family    Psychosocial/Cultural: not , one daughter, lives with and is care taker for disabled mother and sister AIDEE    Spiritual:     F- Ronit and Belief: Anglican     I - Importance:   .  C - Community:     A - Address in Care:        > 50% of  70 min visit spent in chart review, face to face discussion of goals of care,  symptom assessment, coordination of care and emotional support.  In addition greater than 20 minutes spent in advanced care planning     Luisa Reilly, CNS  Palliative Medicine  [facility]

## 2019-09-27 NOTE — PLAN OF CARE
Per MD note: Grim prognosis  - Staff to discuss with family  - Palliative care consult  - Comfort care

## 2019-09-27 NOTE — CARE UPDATE
Patient extubated w/o parameters per MD. Placed on nasal cannula 5 lpm. Patient comfortable and able to vocalize. Will continue to monitor

## 2019-09-27 NOTE — H&P
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 Morphine gtt OK     - Dyspnea: mild 2/2 severe cough; on NC O2     - Agitation: none     - Mentation: awake and alert, decreased attention (requires assistance with suction)     - Other: thirst, asking for ice chips    3: Current Treatment regimen   Glycopyrrolate for secretions (optimal over scopolamine), morphine and ativan PRN    4: Family discussion   Two sisters, brother in law, mother in wheelchair, and 3 young nephews at bedside.  All grieving appropriately.  Sister stated that pt not aware about transition to hospice - I discussed with pt that I am from Internal Medicine, and that we would move her out of ICU to a regular bed, keep her comfortable, and ensure that she does not suffer.  Daughter not present at time of my eval.     5: Plan of care   Comfort care    6: Follow up plans:    Daily       Subjective:     Hospice Qualifying Diagnosis:   Diffuse large B cell lymphoma with bowel perforation/evisceration. 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  Not a candidate for surgery of chemotherapy.     HPI:   Zaira Gupta is a 58 y.o. female with autoimmune hepatitis/cirrhosis with splenomegaly and portal HTN (S/P TIPS), CKD-3, Reflex esophagitis, and  "hypothyroidism, admitted to SICU as a transfer from [facility] on 9/13 for pneumoperitoneum, "CT images reviewed, significant free air throughout abdomen.  Plan for Class A emergent laparotomy.  Patient aware she is at a higher risk for morbidity and mortality due to her underlying liver disease," did ok and stepped down to the floor then stepped back up to SICU for melena.   Pathology report from bowel resection biopsy showed diffuse B cell lymphoma (with poor prognosis) based on Hem/Onc. As mentioned by hem/Onc that the intra-luminal lymphoma might had been the cause of the perforation, and even with starting chemo she may have additional perforations.  Palliative Care (Luisa CASTANON) consulted 9/26.  Per General surgery Dr Sánchez today 9/27, " Patient made DNR/DNI overnight.  Extensive discussion had with family including daughter and sister.  Patient with extremely poor prognosis.  Would not benefit from surgical intervention for recent evisceration."   Intubated and encephalopathic.  Palliative extubation today around noon, to 5L NC, pt comfortable and able to vocalize.      Per palliative care consult yesterday, "4:15 PM Family meeting with Dr. Sánchez, patient's daughter Antonio, sister Lacie, niece and Palliative Medicine APRN  - Dr. Sánchez provided patient with clinical updates - and emphasizing the poor prognosis and death.  - Palliative care explained appropriateness of comfort and hospice care if Ms. Moy is able to be extubated.   - Family has little knowledge and experience with hospice care.    - Patient's daughter is very distraught and appears overwhelmed by this information.  Patient's sister appears to have good understanding.   - Resuscitation status discussed. Risk and benefits of CPR provided.  Family is amenable to DNR orders       Plan/Recommendations  - Family amenable to DNR orders - to be written per primary team.   - Family would benefit from continued information and education regarding " "clinical condition and prognosis   - Palliative care will continue to follow and assist family in developing realistic goals of care.    - If Mrs. Gupta is able to be extubated. Recommend general inpatient hospice here at [facility]"       Past Medical History:   Diagnosis Date    Acid reflux     Anemia     Arthritis     Autoimmune hepatitis 10/23/2012      Ref. Range 2012 09:38  BRANT Latest Range: Neg <1:160  Pos, Titer to follow (A)  BRANT HEP-2 Titer Latest Range: Neg <1:160 titer Pos 1:320 (A)  BRANT Hep-2 Pattern No range found Homogeneous (A)  Anti-SSA Antibody Latest Range: <20 EU 1.68  Anti-SSA Interpretation Latest Range: Negative  Negative  Anti-SSB Antibody Latest Range: <20 EU 1.76  Anti-SSB Interpretation Latest Range: Negative  Negativ    Cirrhosis     Dry mouth     Esophageal varix bleeding     Hypothyroidism     Obesity     Thrombocytopenia        Past Surgical History:   Procedure Laterality Date    BLADDER SURGERY  in      SECTION, CLASSIC      DILATION AND CURETTAGE OF UTERUS   MAB    ESOPHAGOGASTRODUODENOSCOPY N/A 2019    Procedure: EGD (ESOPHAGOGASTRODUODENOSCOPY);  Surgeon: Silverio Tran MD;  Location: [address];  Service: Endoscopy;  Laterality: N/A;    TIPS PROCEDURE      TIPS revision  2012    TONSILLECTOMY, ADENOIDECTOMY          Family History     Problem Relation (Age of Onset)    Arthritis Mother    Diabetes Mother, Sister, Sister    Hypertension Sister          Tobacco Use    Smoking status: Former Smoker     Packs/day: 0.10     Types: Cigarettes    Smokeless tobacco: Never Used   Substance and Sexual Activity    Alcohol use: No     Alcohol/week: 0.0 standard drinks    Drug use: No    Sexual activity: Never       Review of patient's allergies indicates:   Allergen Reactions    Aspirin (bulk) Other (See Comments)    Heparin analogues Other (See Comments)     Difficulty to arouse    Nsaids (non-steroidal anti-inflammatory " drug)          Review of Symptoms    Pain Scale: 0 /10   Constitutional: no fever or chills  Respiratory: +cough; no shortness of breath  Cardiovascular: no chest pain or palpitations  Gastrointestinal: no nausea or vomiting      Objective:     Vital Signs (Most Recent):  LMP  (LMP Unknown)         General appearance: no distress, coughing  Mental status: awake, decreased attention, answering questions appropriately   Abd: soft, NT, ND  Ext: no c/c/e    Medications:  Scheduled Meds:    Scheduled Meds:  Continuous Infusions:  PRN Meds:.       Significant Imaging: I have reviewed all pertinent imaging results/findings within the past 24 hours.    Advanced Directives::  Living Will: No  LaPOST: No  Do Not Resuscitate Status: Yes  Surrogate Decision maker / Medical Power of : daughter Maddie Shaw [phone]      > 50% of 38 min visit spent in chart review, face to face discussion of goals of care,  symptom assessment, coordination of care and emotional support.    Cyn York MD  Hospital Medicine [facility]

## 2019-09-27 NOTE — PLAN OF CARE
Brief Hematology Follow-Up Note     Patient seen today, she is opening eyes to voice. Daughter, grandson and nephew at bedside.     Impression  1) Stage IV DLBCL, diagnosed by bowel resection 9/13/19  -Available imaging to date: CTA chest 4/1/19 showed pulmonary nodules ~1cm. CT A/P on 9/12/19 showed perinephric LN, moderate splenomegaly, small cirrhotic liver     2) Transaminitis in setting of history of autoimmune hepatitis and cirrhosis  -Tbili 6.6 today     3) Coagulopathy due to liver dysfunction vs DIC     4) GENIE  -Cr 2.0 today     5) Thrombocytopenia, anemia, possibly d/t lymphoma, vs related to medications, blood loss, critical illness     6) AMS with concern for airway protection  -Patient intubated on 9/24, extubated 9/27     Recommendations  -Allopurinol for TLS prophylaxis  -Patient high risk for treatment (including risk of bowel perforation, and worsening hepatic encephalopathy) and mortality is 100% without treatment. Discussed risks of treatment and poor prognosis with family at bedside.  -Patient transitioning to inpatient hospice     The following was staffed and discussed with supervising physician Dr. Ambriz. Please contact BMT Fellow with any questions.     Sarai Hinkle MD PGY-V  Hematology/Oncology Fellow

## 2019-09-27 NOTE — SUBJECTIVE & OBJECTIVE
Interval History/Significant Events: No acute events overnight. Goals of care with family done yesterday, DNR form signed and comfort care is the plan     Follow-up For: Procedure(s) (LRB):  EGD (ESOPHAGOGASTRODUODENOSCOPY) (N/A)    Post-Operative Day: 4 Days Post-Op    Objective:     Vital Signs (Most Recent):  Temp: 98.8 °F (37.1 °C) (09/27/19 0700)  Pulse: 96 (09/27/19 1000)  Resp: 17 (09/27/19 1000)  BP: (!) 113/53 (09/27/19 1000)  SpO2: 100 % (09/27/19 1000) Vital Signs (24h Range):  Temp:  [98.2 °F (36.8 °C)-98.8 °F (37.1 °C)] 98.8 °F (37.1 °C)  Pulse:  [] 96  Resp:  [14-30] 17  SpO2:  [98 %-100 %] 100 %  BP: ()/() 113/53     Weight: (!) 137.9 kg (304 lb 0.2 oz)  Body mass index is 50.59 kg/m².      Intake/Output Summary (Last 24 hours) at 9/27/2019 1106  Last data filed at 9/27/2019 1000  Gross per 24 hour   Intake 2575 ml   Output 1705 ml   Net 870 ml       Physical Exam   Constitutional: She appears well-developed and well-nourished. No distress.   Eyes: Conjunctivae are normal. Right eye exhibits no discharge. Left eye exhibits no discharge. No scleral icterus.   Cardiovascular: Normal rate and regular rhythm. Exam reveals no friction rub.   No murmur heard.  Pulmonary/Chest: Effort normal. No stridor. No respiratory distress. She has no wheezes. She has rales.   Intubated    Abdominal: Soft. Bowel sounds are normal. She exhibits no distension. There is no tenderness. There is no guarding.   Musculoskeletal: She exhibits no edema, tenderness or deformity.   Neurological:   On sedation    Skin: She is not diaphoretic.       Vents:  Vent Mode: PSV (09/27/19 0902)  Set Rate: 30 bmp (09/26/19 2010)  Vt Set: 0 mL (09/26/19 1732)  Pressure Support: 0 cmH20 (09/27/19 0902)  PEEP/CPAP: 8 cmH20 (09/27/19 0902)  Oxygen Concentration (%): 40 (09/27/19 0804)  Peak Airway Pressure: 10 cmH2O (09/27/19 0902)  Plateau Pressure: 0 cmH20 (09/26/19 1732)  Total Ve: 7.41 mL (09/27/19 0902)  F/VT Ratio<105  (RSBI): (!) 36.12 (09/27/19 0902)    Lines/Drains/Airways     Central Venous Catheter Line                 Percutaneous Central Line Insertion/Assessment - triple lumen  09/23/19 0300 right internal jugular 4 days          Drain                 Urethral Catheter 09/23/19 0130 Double-lumen 4 days         Rectal Tube 09/24/19 0700 fecal management system 3 days         NG/OG Tube 09/24/19 1200 Right nostril 2 days          Airway                 Airway - Non-Surgical 09/24/19 0739 Endotracheal Tube 3 days          Peripheral Intravenous Line                 Midline Catheter Insertion/Assessment  - Single Lumen 09/16/19 1032 Right brachial vein 18g x 10cm 11 days                Significant Labs:    CBC/Anemia Profile:  Recent Labs   Lab 09/26/19  2041 09/27/19  0300 09/27/19  0909   WBC 5.14 6.27 5.63   HGB 7.9* 7.5* 7.8*   HCT 24.8* 25.5* 25.3*   PLT 48* 49* 45*   * 112* 111*   RDW 24.9* 25.5* 26.4*        Chemistries:  Recent Labs   Lab 09/26/19  0300 09/27/19  0300   * 147*   K 4.0 4.0   * 114*   CO2 20* 20*   BUN 87* 84*   CREATININE 2.1* 2.0*   CALCIUM 8.0* 8.1*   ALBUMIN 1.5* 1.5*   PROT 4.5* 4.6*   BILITOT 6.8* 6.6*   ALKPHOS 83 89   ALT 34 41   AST 90* 113*   MG 2.3 2.5   PHOS 4.3 3.5       All pertinent labs within the past 24 hours have been reviewed.    Significant Imaging:  I have reviewed all pertinent imaging results/findings within the past 24 hours.

## 2019-09-27 NOTE — PROGRESS NOTES
[facility]  General Surgery  Progress Note    Subjective:     History of Present Illness:  No notes on file    Post-Op Info:  Procedure(s) (LRB):  EGD (ESOPHAGOGASTRODUODENOSCOPY) (N/A)   4 Days Post-Op     Interval History: Patient noted to have bowel eviscerating from midline incision during wound vac yesterday. Wound vac replaced by SI team. No further acute events overnight. Family presented at bedside overnight; questions addressed by nursing staff; will returning to talk with physician team. Still in NSR to ST throughout overnight shift. UOP > 30 ml/hr.    Medications:  Continuous Infusions:   lactated ringers 100 mL/hr at 09/27/19 0600     Scheduled Meds:   albuterol-ipratropium  3 mL Nebulization Q4H WAKE    allopurinol  300 mg Per NG tube Daily    cefTRIAXone (ROCEPHIN) IVPB  1 g Intravenous Q24H    chlorhexidine  15 mL Mouth/Throat BID    levothyroxine  175 mcg Per NG tube Daily    pantoprazole  40 mg Intravenous BID    rifAXImin  550 mg Per NG tube BID     PRN Meds:sodium chloride, benzocaine, Dextrose 10% Bolus, Dextrose 10% Bolus, glucagon (human recombinant), insulin aspart U-100, lidocaine, ondansetron, promethazine (PHENERGAN) IVPB, sodium chloride 0.9%     Review of patient's allergies indicates:   Allergen Reactions    Aspirin (bulk) Other (See Comments)    Heparin analogues Other (See Comments)     Difficulty to arouse    Nsaids (non-steroidal anti-inflammatory drug)      Objective:     Vital Signs (Most Recent):  Temp: 98.6 °F (37 °C) (09/27/19 0300)  Pulse: 94 (09/27/19 0645)  Resp: 17 (09/27/19 0645)  BP: (!) 111/56 (09/27/19 0645)  SpO2: 99 % (09/27/19 0645) Vital Signs (24h Range):  Temp:  [98.2 °F (36.8 °C)-98.7 °F (37.1 °C)] 98.6 °F (37 °C)  Pulse:  [] 94  Resp:  [15-30] 17  SpO2:  [98 %-100 %] 99 %  BP: ()/() 111/56     Weight: (!) 137.9 kg (304 lb 0.2 oz)  Body mass index is 50.59 kg/m².    Intake/Output - Last 3 Shifts       09/25 0700 -  09/26 0659 09/26 0700 - 09/27 0659 09/27 0700 - 09/28 0659    I.V. (mL/kg) 1470 (10.6) 2345 (17)     Blood 537.5      NG/ 240     IV Piggyback       Total Intake(mL/kg) 2177.5 (15.8) 2585 (18.7)     Urine (mL/kg/hr) 1390 (0.4) 1495 (0.5)     Drains 100      Other 100 250     Stool 150 50     Total Output 1740 1795     Net +437.5 +790                  Physical Exam   Constitutional:   Intubated. Not on sedation. Minimal response to stimuli   HENT:   Head: Normocephalic and atraumatic.   Eyes:   Not opening eyes to painful stimulus   Cardiovascular: Normal rate.   Pulmonary/Chest:   Vent Mode: Spont  Oxygen Concentration (%):  (40) 40  Resp Rate Total:  (16 br/min-34 br/min) 21 br/min  Vt Set:  (400 mL) 400 mL  PEEP/CPAP:  (8 cmH20) 8 cmH20  Pressure Support:  (0 cmH20) 0 cmH20  Mean Airway Pressure:  (9.4 cmH20-9.6 cmH20) 9.5 cmH20     Abdominal: Soft. She exhibits no distension. There is no tenderness.   Neurological:   Minimal response to stimuli       Significant Labs:  CBC:   Recent Labs   Lab 09/27/19  0300   WBC 6.27   RBC 2.28*   HGB 7.5*   HCT 25.5*   PLT 49*   *   MCH 32.9*   MCHC 29.4*     CMP:   Recent Labs   Lab 09/27/19  0300      CALCIUM 8.1*   ALBUMIN 1.5*   PROT 4.6*   *   K 4.0   CO2 20*   *   BUN 84*   CREATININE 2.0*   ALKPHOS 89   ALT 41   *   BILITOT 6.6*     ABGs:   Recent Labs   Lab 09/24/19  0908   PH 7.345*   PCO2 38.8   PO2 105*   HCO3 21.2*   POCSATURATED 98   BE -5     Significant Diagnostics:  I have reviewed all pertinent imaging results/findings within the past 24 hours.    Assessment/Plan:     * Small bowel perforation  58F s/p 9/13 Ex-Lap and small bowel resection. Transferred to SICU early AM 9/23 for new GI bleed.    - Grim prognosis  - Staff to discuss with family this am  - Palliative care consult in  - Comfort care    Danyelle Almazan MD  General Surgery  [facility]

## 2019-09-27 NOTE — PLAN OF CARE
Per MD note:  Interval History: Transferred to SICU o/n after having several bloody BMs, drop in H/H, and tachycardia.  Received 2u PRBC.  R IJ central line placed and briefly on low dose levo. POD # 10, S/p Exploratory lap.        09/23/19 1500   Discharge Reassessment   Assessment Type Discharge Planning Reassessment   Provided patient/caregiver education on the expected discharge date and the discharge plan No  (Ill, in ICU. Plan TBD. PT/OT will need to re-eval once she stepsdown form ICU. (Prior to ICU admit pt D/c plan was O SNF. )   Discharge Plan A Skilled Nursing Facility   Discharge Plan B Skilled Nursing Facility   DME Needed Upon Discharge    (TBD)   Anticipated Discharge Disposition SNF

## 2019-09-27 NOTE — NURSING
Patient admitted to room 8065 under care of [facility] hospice. Hospice nurse present.

## 2019-09-27 NOTE — ASSESSMENT & PLAN NOTE
Ms. Gupta is a 59 y/o lady with a known case of Alcohol induced hepatitis/chirrosis with splenomegaly, Portal HTN (S/P TIPS on 202), Reflex esophagitis, CKD 3 and hypothyroidism. Pt was previously stepped down from SICU  After she has EX-LAP for bowel perforation on 9/13/2019. She got stepped up from the floor due to blood/melena per rectum. Pathology report from bowel resection biopsy showed diffuse B cell lymphoma (with poor prognosis) based on Hem/Onc. As mentioned by hem/Onc that the intra-luminal lymphoma might had been the cause of the perforation, and even with starting chemo the Pt might have additional perforations     Neuro:   - Sedation: Currently off propofol   - On 9/24 got intubated for airway protection (due to severe altered mental status)  - Etiology of AMS most likely due to high ammonia levels (hepatic encephalopathy) >> Pt today alert, and following commands (not consistent)   - Previously on PO/Rectal lactulose >> switched to rifaximin >> currently D/C    Plan:  - D/C all meds       Pulmonary:   - Currently intubated on 9/24 for airway protection due to low GCS    Plan:  - Extubation and switch to comfort care (possible inpatient hospice)       Cardiac:   - @ home he takes spironolactone 100 mg, and lasix 40 mg   - Hemodynamic stable  - Not on pressors nor drips   - No ECHO on chart         Renal: Known case of CKD 3  - Weinberg in place  - Monitor UOP  - on 9/25 Cr bumped up >> GENIE most likely due to pre-renal cause Vs. Elevated uric acid Vs. Hepatorenal picture   - On IVF maintenance   - On allopurinol for possible tumor lysis syndrome Vs. Gout      Fluids/Electrolytes/Nutrition/GI: Alcohol induced hepatitis/chirrosis with splenomegaly, Portal HTN (S/P TIPS on 202), Reflex esophagitis. S/P Bowel perforation and underwent EX LAP on 9/13   - NGT placed 9/24   - Rectal tube placed on 9/24   - Previously on Protonix and octreotide gtt. Currently on pantoprazole 40  mg BID  - Nutritional status: On  "Trickle feeds 10 cc/hr  - replace lytes PRN       Hematology/Oncology: Small bowel excision biopsy results from 9/13/19 returned today revealing "Diffuse large B-cell lymphoma  - H/H has been stable since she received 2 U PRBC on 9/23 and 1 U on 9/25. Received cryoprecipitate 1 U on 9/24, and 9/25 for low fibrinogen.   - H/H stable   - Hem/Onc on board in regards the Lymphoma >> recommendations = Transfuse for fibrinogen <100, Hb <7, plt <10 or active bleeding, and daily tumor lysis workup   - Palliative care on board and plan for inpatient hospice     Plan:  - F/U Hem/Onc recs  - Consult inpatient hospice       Infectious Disease:   - Afebrile  - WBC  WNL  - D/C ceftriaxone (for SBP prophylaxis )        Endocrine: Hypothyroidism   - Glucose goal of 140 - 180 u  - SSI    Dispo: transfer Inpatient hospice for comfort care   "

## 2019-09-27 NOTE — PLAN OF CARE
09/27/19 1138   Post-Acute Status   Post-Acute Authorization Home Health/Hospice   Home Health/Hospice Status Referrals Sent   Jeny notified by Dr. Shannon that pt needs inpatient hospice. JENY left message for Sultana at [facility] and faxed over referral.      UPDATE:11:52 AM  JENY spoke with Dave ([phone]) with [facility]. He has received the referral and will update JENY.    Dinah Burgos LMSW  [facility]  [phone]

## 2019-09-27 NOTE — DISCHARGE INSTRUCTIONS
Handoff given to ANA LUISA Palmer in [id number].  Team and CN aware.  Pt. Being transferred to inpatient hospice.

## 2019-09-28 NOTE — NURSING
Patient oriented to room and surrounding. Family present at bedside. Suction set up. Review of plan of care. Call light in reach.

## 2019-09-28 NOTE — PLAN OF CARE
Pt remains free from falls/injury. No acute events during shift. Bed in lowest position, wheels locked, side rails up times 2, call light w/in reach, bed alarm on. Pt turned q2h. All comfort concerns addressed. Morphin drip increased to 1.5 mg/hr. Dressing to multiple skin tears changed per pt request. Pt suctioned PRN. WCTM.

## 2019-09-28 NOTE — PROGRESS NOTES
Patient transferred to inpatient hospice yesterday  Patient seen and examined  Speaking and asking about progress, non scenically   Wound vac in place, some separation at right side, fixed leak and restarted wound vac  No surgical interventions needed  Please call on call surgery resident with any questions    Rick Alcaraz MD PGY V  General Surgery Consults

## 2019-09-28 NOTE — PLAN OF CARE
No acute changes overnight. Hourly rounding performed. Comfort measures in place. Pt resting. No needs at this time. Call bell in reach. Will continue to monitor.

## 2019-09-28 NOTE — PROGRESS NOTES
"[facility]  Inpatient Progress Note    Patient Name: Zaira Gupta  MRN: [id number]  Admission Date: 9/27/2019  Hospital Length of Stay: 1 days  Code Status: DNR   Attending Provider: Cyn York MD  Hospice Diagnosis:Diffuse large B cell lymphoma        Assessment/Plan:     Hospice qualifying diagnosis:  Diffuse large B cell lymphoma    Hospice Encounter / Goals of care discussion:      Narrative: 58F with cirrhosis 2/2 autoimmune hepatitis, now with newly Dx-ed diffuse large B cell lymphoma with bowel perforation and evisceration, not a candidate for surgery or chemo/XRT, transitioned to hospice overnight, extubated today, doing okay, not yet on morphine or ativan, just with severe secretions/cough      1: Symptom Assessment:                 - Pain: none at this time. Morphine gtt WA                 - Dyspnea: mild                  - Agitation: none                 - Mentation: awake and alert             3: Current Treatment regimen   Scheduled Meds:  Continuous Infusions:   morphine 1.75 mg/hr (09/28/19 1806)     As Needed: glycopyrrolate, lorazepam, morphine, ondansetron, promethazine (PHENERGAN) IVPB       4: Family discussion  None at bedside     5: Plan of care              Comfort care     6: Follow up plans:               Daily         Subjective:     Hospice Qualifying Diagnosis:   Diffuse large B cell lymphoma with bowel perforation/evisceration. 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  Not a candidate for surgery of chemotherapy.      HPI:   Zaira Gupta is a 58 y.o. female with autoimmune hepatitis/cirrhosis with splenomegaly and portal HTN (S/P TIPS), CKD-3, Reflex esophagitis, and hypothyroidism, admitted to SICU as a transfer from [facility] on 9/13 for pneumoperitoneum, "CT images reviewed, significant free air throughout abdomen.  Plan for Class A emergent laparotomy.  Patient aware she is at a higher risk for morbidity and mortality due to her underlying liver disease," did ok and " "stepped down to the floor then stepped back up to SICU for melena.   Pathology report from bowel resection biopsy showed diffuse B cell lymphoma (with poor prognosis) based on Hem/Onc. As mentioned by hem/Onc that the intra-luminal lymphoma might had been the cause of the perforation, and even with starting chemo she may have additional perforations.  Palliative Care (Luisa CASTANON) consulted 9/26.  Per General surgery Dr Sánchez today 9/27, " Patient made DNR/DNI overnight.  Extensive discussion had with family including daughter and sister.  Patient with extremely poor prognosis.  Would not benefit from surgical intervention for recent evisceration."   Intubated and encephalopathic.  Palliative extubation today around noon, to 5L NC, pt comfortable and able to vocalize.       Per palliative care consult yesterday, "4:15 PM Family meeting with Dr. Sánchez, patient's daughter Antonio, sister Lacie, niece and Palliative Medicine APRN  - Dr. Sánchez provided patient with clinical updates - and emphasizing the poor prognosis and death.  - Palliative care explained appropriateness of comfort and hospice care if Ms. Moy is able to be extubated.   - Family has little knowledge and experience with hospice care.    - Patient's daughter is very distraught and appears overwhelmed by this information.  Patient's sister appears to have good understanding.   - Resuscitation status discussed. Risk and benefits of CPR provided.  Family is amenable to DNR orders       9/28 pt seen, she is feeling some pain especially in her hands and mid abdmen, some drainage from the abd, nurses addressing it.       Objective:     Vital Signs (Most Recent):  BP (!) 102/42 (BP Location: Left arm, Patient Position: Lying)   Pulse 104   Temp 97.7 °F (36.5 °C) (Axillary)   Resp 20   LMP  (LMP Unknown)   SpO2 (!) 91%         Physical Exam  NAD  Waxes and wanes with pain I her arms.  Obesity  s1s2  CTA anteriorly.  Positive hands wipping " dressing.  Medications:    Scheduled Meds:  Continuous Infusions:   morphine 1.75 mg/hr (09/28/19 1806)     PRN Meds:.glycopyrrolate, lorazepam, morphine, ondansetron, promethazine (PHENERGAN) IVPB         > 50% of 24min visit spent in chart review, face to face discussion of goals of care,  symptom assessment, coordination of care and emotional support.    Yaniv WOODSON. KIMMIE. CPE.  Department of Hospital Medicine  [facility]    Palliative Medicine  [facility]

## 2019-09-28 NOTE — NURSING
Pt c/o persistent pain to backside. Rates 8-9/10. Pt turned/ repositioned, Morphin drip increased to 1.5 mg/hr. WCTM.

## 2019-09-28 NOTE — SUBJECTIVE & OBJECTIVE
Current Facility-Administered Medications on File Prior to Encounter   Medication    [COMPLETED] cefTRIAXone injection 1 g    [DISCONTINUED] 0.9%  NaCl infusion (for blood administration)    [DISCONTINUED] albuterol-ipratropium 2.5 mg-0.5 mg/3 mL nebulizer solution 3 mL    [DISCONTINUED] allopurinol tablet 300 mg    [DISCONTINUED] benzocaine 20 % oral spray    [DISCONTINUED] chlorhexidine 0.12 % solution 15 mL    [DISCONTINUED] dextrose 10% (D10W) Bolus    [DISCONTINUED] dextrose 10% (D10W) Bolus    [DISCONTINUED] glucagon (human recombinant) injection 1 mg    [DISCONTINUED] insulin aspart U-100 pen 0-5 Units    [DISCONTINUED] lactated ringers infusion    [DISCONTINUED] levothyroxine tablet 175 mcg    [DISCONTINUED] lidocaine 5 % ointment    [DISCONTINUED] ondansetron injection 4 mg    [DISCONTINUED] pantoprazole injection 40 mg    [DISCONTINUED] promethazine (PHENERGAN) 6.25 mg in dextrose 5 % 50 mL IVPB    [DISCONTINUED] rifAXIMin tablet 550 mg    [DISCONTINUED] sodium chloride 0.9% flush 10 mL     No current outpatient medications on file prior to encounter.       Review of patient's allergies indicates:   Allergen Reactions    Aspirin (bulk) Other (See Comments)    Heparin analogues Other (See Comments)     Difficulty to arouse    Nsaids (non-steroidal anti-inflammatory drug)        Past Medical History:   Diagnosis Date    Acid reflux     Anemia     Arthritis     Autoimmune hepatitis 10/23/2012      Ref. Range 2/6/2012 09:38  BRANT Latest Range: Neg <1:160  Pos, Titer to follow (A)  BRANT HEP-2 Titer Latest Range: Neg <1:160 titer Pos 1:320 (A)  BRANT Hep-2 Pattern No range found Homogeneous (A)  Anti-SSA Antibody Latest Range: <20 EU 1.68  Anti-SSA Interpretation Latest Range: Negative  Negative  Anti-SSB Antibody Latest Range: <20 EU 1.76  Anti-SSB Interpretation Latest Range: Negative  Negativ    Cirrhosis     Dry mouth     Esophageal varix bleeding     Hypothyroidism     Obesity      Thrombocytopenia      Past Surgical History:   Procedure Laterality Date    BLADDER SURGERY  in      SECTION, CLASSIC      DILATION AND CURETTAGE OF UTERUS   MAB    ESOPHAGOGASTRODUODENOSCOPY N/A 2019    Procedure: EGD (ESOPHAGOGASTRODUODENOSCOPY);  Surgeon: Silverio Tran MD;  Location: [facility] ([address] [place] [address]);  Service: Endoscopy;  Laterality: N/A;    TIPS PROCEDURE      TIPS revision  2012    TONSILLECTOMY, ADENOIDECTOMY       Family History     Problem Relation (Age of Onset)    Arthritis Mother    Diabetes Mother, Sister, Sister    Hypertension Sister        Tobacco Use    Smoking status: Former Smoker     Packs/day: 0.10     Types: Cigarettes    Smokeless tobacco: Never Used   Substance and Sexual Activity    Alcohol use: No     Alcohol/week: 0.0 standard drinks    Drug use: No    Sexual activity: Never     Review of Systems  Objective:     Vital Signs (Most Recent):  Temp: 97.7 °F (36.5 °C) (19)  Pulse: 104 (19)  Resp: 20 (19)  BP: (!) 102/42 (19)  SpO2: (!) 91 % (19) Vital Signs (24h Range):  Temp:  [96.7 °F (35.9 °C)-98.9 °F (37.2 °C)] 97.7 °F (36.5 °C)  Pulse:  [] 104  Resp:  [13-23] 20  SpO2:  [91 %-100 %] 91 %  BP: ()/(42-63) 102/42        There is no height or weight on file to calculate BMI.    Physical Exam   Constitutional: No distress.   HENT:   Head: Normocephalic.   Cardiovascular: Normal rate.   Pulmonary/Chest: Effort normal.   Abdominal: Soft.   Wound vac in place, some separation at right side   Neurological: She is alert.   Speaking, not making significant sense       Significant Labs:  CBC:   Recent Labs   Lab 19  0909   WBC 5.63   RBC 2.28*   HGB 7.8*   HCT 25.3*   PLT 45*   *   MCH 34.2*   MCHC 30.8*     CMP:   Recent Labs   Lab 19  0300      CALCIUM 8.1*   ALBUMIN 1.5*   PROT 4.6*   *   K 4.0   CO2 20*   *   BUN 84*   CREATININE 2.0*   ALKPHOS  89   ALT 41   *   BILITOT 6.6*       Significant Diagnostics:  I have reviewed all pertinent imaging results/findings within the past 24 hours.

## 2019-09-29 NOTE — PROGRESS NOTES
Patient seen and examined  Unable to examine abdomen given positioning  Patient appears comfortable and in no distress  Please call with any further questions  Will sign off at this time    Rick Alcaraz MD PGY V  General Surgery Consults

## 2019-09-29 NOTE — SIGNIFICANT EVENT
I was paged around [phone] to evaluate the patient who was in asystole and is not breathing.    I evaluated the pt.  She had no chest wall motion  No breath sounds appreciated on auscultation.  No heart pulse was appreciated on palpation and auscultation.  Absent corneal reflex  Absent pupillary reflex and reactivity to light.    The pt was pronounced dead at 10:23 am on 9/29/2019.    Daughter at bedside, given emotional support.  All her concerns are being addressed.    [facility] nurse/care coordinator is on site.    Primary Cause for Death:  Diffuse large B cell lymphoma with bowel perforation/evisceration.   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        Yaniv Zelaya DO. ADELINA. KIMMIE. CPE.  Department of Hospital Medicine  [facility]

## 2019-09-29 NOTE — NURSING
----- Message from Ami Valencia sent at 11/2/2018  9:52 AM CDT -----  Contact: Heather/[facility]  Heather/[facility] is requesting to speak with a nurse to see if Dr receive a fax for pt's clearance for a pump trial  Please call to advise  Call Back   Thanks   Called to pts bedside by pts nurse stating that patient had . No palpable pulses noted, no heartbeat or breath sounds auscultated. Hospice nurse notified and she is notifying attending. Pts daughter called and notified.

## 2019-10-04 NOTE — DISCHARGE SUMMARY
[facility]  General Surgery  Discharge Summary      Patient Name: Zaira Gupta  MRN: [id number]  Admission Date: 9/13/2019  Hospital Length of Stay: 14 days  Discharge Date and Time: 9/27/2019  5:26 PM  Attending Physician: Zay Sánchez MD  Discharging Provider: Lien Arango MD  Primary Care Provider: Minna Canchola NP     HPI: 57 yo  female with Hx of AIH, cirrhosis with splenomegaly and low-volume ascites, portal HTN s/p TIPS (2012), reflux esophagitis (on protonix), hypothyroidism, CKD III, and morbid obesity who presents to ED with chief complaint of abdominal pain. She states she had a coughing fit at 11:00 am and approximately and hour or two later began to have intense lower abdominal pain that was so severe she couldn't walk. After the abdominal pain she developed nausea and came to the ED.     Workup revealed perforated viscus, likely stomach. Given underlying significant liver disease, patient transferred here for higher level care.     Procedure(s) (LRB):  EGD (ESOPHAGOGASTRODUODENOSCOPY) (N/A)     Hospital Course: Ms. Gupta underwent an exploratory laparotomy with small bowel resection for a small bowel perforation on 9/13/19. Her post operative course was complicated by a GI bleed as well as hepatic encephalopathy. Her pathology report showed small bowel lymphoma. Given her extensive disease and poor functional status with her liver disease, the decision was made to make her hospice care.     Consults:   Consults (From admission, onward)        Status Ordering Provider     Inpatient consult to ENT  Once     Provider:  (Not yet assigned)    Completed LUANA BOUCHER     Inpatient consult to Gastroenterology  Once     Provider:  (Not yet assigned)    Completed MYLES MONSON     Inpatient consult to Hematology/Oncology  Once     Provider:  (Not yet assigned)    Completed YISSEL BROWNING     Inpatient consult to Hepatology  Once     Provider:  (Not yet  assigned)    Completed LUANA BOUCHER     Inpatient consult to Midline team  Once     Provider:  (Not yet assigned)    Completed ESTELLE GONZALEZ     Inpatient consult to Midline team  Once     Provider:  (Not yet assigned)    Completed MARTY GUTIERREZ     Inpatient consult to Palliative Care  Once     Provider:  (Not yet assigned)    Completed CARMELA GRAYSON          Significant Diagnostic Studies: Labs: BMP: No results for input(s): GLU, NA, K, CL, CO2, BUN, CREATININE, CALCIUM, MG in the last 48 hours. and CBC No results for input(s): WBC, HGB, HCT, PLT in the last 48 hours.    Pending Diagnostic Studies:     Procedure Component Value Units Date/Time    CBC auto differential [[id number]] Collected:  09/23/19 0606    Order Status:  Sent Lab Status:  In process Updated:  09/23/19 0607    Specimen:  Blood     CBC auto differential [[id number]] Collected:  09/17/19 0308    Order Status:  Sent Lab Status:  In process Updated:  09/17/19 0309    Specimen:  Blood     CBC auto differential [[id number]] Collected:  09/16/19 0310    Order Status:  Sent Lab Status:  In process Updated:  09/16/19 0310    Specimen:  Blood     Comprehensive metabolic panel [[id number]] Collected:  09/17/19 0308    Order Status:  Sent Lab Status:  In process Updated:  09/17/19 0309    Specimen:  Blood     Comprehensive metabolic panel [[id number]] Collected:  09/16/19 0310    Order Status:  Sent Lab Status:  In process Updated:  09/16/19 0310    Specimen:  Blood     Magnesium [[id number]] Collected:  09/17/19 0308    Order Status:  Sent Lab Status:  In process Updated:  09/17/19 0309    Specimen:  Blood     Magnesium [[id number]] Collected:  09/16/19 0310    Order Status:  Sent Lab Status:  In process Updated:  09/16/19 0310    Specimen:  Blood     Phosphorus [[id number]] Collected:  09/17/19 0308    Order Status:  Sent Lab Status:  In process Updated:  09/17/19 0309    Specimen:  Blood     Phosphorus [[id number]] Collected:  09/16/19 0310     Order Status:  Sent Lab Status:  In process Updated:  19    Specimen:  Blood         Final Active Diagnoses:    Diagnosis Date Noted POA    PRINCIPAL PROBLEM:  Small bowel perforation [K63.1] 2019 Yes    Advanced care planning/counseling discussion [Z71.89]  Not Applicable    Goals of care, counseling/discussion [Z71.89]  Not Applicable    Palliative care encounter [Z51.5] 2019 Not Applicable    Acute GI bleeding [K92.2] 2019 Unknown    Epistaxis [R04.0] 2019 Unknown    Dysphagia [R13.10] 2019 Unknown    Open wound of abdomen [S31.109A] 2019 Yes    Alteration in skin integrity [R23.9] 2019 Yes    Acute confusional state [F05]  Yes    Gastric perforation [K25.5] 2019 Yes    Cirrhosis [K74.60] 11/15/2013 Yes    Hypothyroidism [E03.9] 2013 Yes      Problems Resolved During this Admission:    Diagnosis Date Noted Date Resolved POA    Gastric perforation [K25.5] 2019 Yes      Discharged Condition:     Disposition: Hospice/Medical Facility    Follow Up:    Patient Instructions:   No discharge procedures on file.  Medications:   Patient  19 at Hospice facility    Lien Arango MD  General Surgery  [facility]

## 2019-10-11 NOTE — DISCHARGE SUMMARY
[facility].       DISCHARGE SUMMARY    Date of admission: 2019  Date of discharge:2019  Attending: Yaniv Zelaya DO.  Discharged as        I was paged around [phone] to evaluate the patient who was in asystole and is not breathing.    I evaluated the pt.  She had no chest wall motion  No breath sounds appreciated on auscultation.  No heart pulse was appreciated on palpation and auscultation.  Absent corneal reflex  Absent pupillary reflex and reactivity to light.    The pt was pronounced dead at 10:23 am on 2019.    Daughter at bedside, given emotional support.  All her concerns are being addressed.    [facility] nurse/care coordinator is on site.    Primary Cause for Death:  Diffuse large B cell lymphoma with bowel perforation/evisceration.   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        Yaniv Zelaya DO. ADELINA. KIMMIE. CPE.  Department of Hospital Medicine  [facility]

## 2022-10-31 NOTE — PROGRESS NOTES
[facility]  Critical Care - Surgery  Progress Note    Patient Name: Zaira Gupta  MRN: [id number]  Admission Date: 9/13/2019  Hospital Length of Stay: 2 days  Code Status: Full Code  Attending Provider: Zay Sánchez MD  Primary Care Provider: Minna Canchola NP   Principal Problem: Gastric perforation    Subjective:     Interval History/Significant Events: NAEO, VSS. Patient had one episode of orthostatic hypotension that resolved with a 500 cc bolus of LR. She reports good pain control with PCA, has not ambulated or had return to bowel function. She is passing gas. She denies subjective fevers, chills.     Follow-up For: Procedure(s) (LRB):  LAPAROTOMY, EXPLORATORY (N/A)    Post-Operative Day: 2 Days Post-Op    Objective:     Vital Signs (Most Recent):  Temp: 98.3 °F (36.8 °C) (09/15/19 0800)  Pulse: 106 (09/15/19 0900)  Resp: (!) 21 (09/15/19 0900)  BP: (!) 107/51 (09/15/19 0900)  SpO2: (!) 88 % (09/15/19 0900) Vital Signs (24h Range):  Temp:  [98.1 °F (36.7 °C)-98.8 °F (37.1 °C)] 98.3 °F (36.8 °C)  Pulse:  [102-112] 106  Resp:  [15-31] 21  SpO2:  [88 %-98 %] 88 %  BP: ()/(39-56) 107/51     Weight: 135 kg (297 lb 9.9 oz)  Body mass index is 49.53 kg/m².      Intake/Output Summary (Last 24 hours) at 9/15/2019 1053  Last data filed at 9/15/2019 0700  Gross per 24 hour   Intake 5193 ml   Output 1100 ml   Net 4093 ml       Physical Exam   Constitutional:   Obese female lying in bed   Cardiovascular: Normal rate, regular rhythm, normal heart sounds and intact distal pulses.   Pulmonary/Chest: Effort normal and breath sounds normal.   Abdominal: Soft. Bowel sounds are normal. She exhibits no distension. There is tenderness (incisional ).   Midline incision clean/dry/intact   Skin: Skin is warm and dry. Capillary refill takes less than 2 seconds.   Nursing note and vitals reviewed.      Vents:       Lines/Drains/Airways     Drain                 Urethral Catheter 09/13/19 0006  16 Fr. 2  days          Peripheral Intravenous Line                 Peripheral IV - Single Lumen 09/12/19 1814 20 G Left Hand 2 days         Peripheral IV - Single Lumen 09/13/19 0021 22 G Right Forearm 2 days         Peripheral IV - Single Lumen 09/13/19 0454 20 G Left Forearm 2 days                Significant Labs:    CBC/Anemia Profile:  Recent Labs   Lab 09/14/19  0329 09/15/19  0324   WBC 5.39 4.94   HGB 8.1* 7.8*   HCT 25.6* 26.2*   PLT 59* 58*   * 117*   RDW 20.1* 20.1*        Chemistries:  Recent Labs   Lab 09/13/19  1521 09/14/19  0329 09/15/19  0324    140 141   K 4.8 4.9 5.0    112* 112*   CO2 15* 14* 18*   BUN 27* 38* 52*   CREATININE 1.4 1.7* 1.5*   CALCIUM 7.4* 7.6* 8.2*   ALBUMIN  --  1.8* 1.7*   PROT  --  4.5* 4.4*   BILITOT  --  4.1* 3.9*   ALKPHOS  --  80 78   ALT  --  21 24   AST  --  51* 61*   MG 2.2 2.3 2.5   PHOS  --  5.0* 4.0       Significant Imaging:  I have reviewed all pertinent imaging results/findings within the past 24 hours.    Assessment/Plan:     Small bowel perforation  POD#2 SBR for small bowel perforation, recovering well in the ICU.    -Plan:    Neuro:   -PCA for pain control     Pulmonary:   -Comfortable on RA, recommend wean to po   - Pulmonary toilet  - IS    Cardiac:  - HDS not requiring pressors   - 500cc bolus for orthostatic hypotension given yesterday    Renal:   - UOP 35-75/hr   - Bun/Cr 38 / 1.7  - Serum ammonia 99    Fluids/Electrolytes/Nutrition/GI:   -Nutritional status: NPO with ice chips  -replace lytes PRN  -mIVF    Hematology/Oncology:  -Hgb 8.1 / 28.9 (12.6)   -Plts 85  -Anticoagulation: severe allergy to heparin/lovenox (asystole in past x2, note of allergy from heme/onc)    Infectious Disease:   -Afebrile  -WBC 12.25 (14)    Endocrine:  -Glucose goal of 120-180    Dispo:  -Recommend step down out of unit          Critical care was time spent personally by me on the following activities: development of treatment plan with patient or surrogate and  bedside caregivers, discussions with consultants, evaluation of patient's response to treatment, examination of patient, ordering and performing treatments and interventions, ordering and review of laboratory studies, ordering and review of radiographic studies, pulse oximetry, re-evaluation of patient's condition.  This critical care time did not overlap with that of any other provider or involve time for any procedures.     Geri Samuels MD  Critical Care - Surgery  [facility]     1

## (undated) DEVICE — SUT 1 48IN PDS II VIO MONO

## (undated) DEVICE — CUTTER PROXIMATE BLUE 75MM

## (undated) DEVICE — RELOAD PROXIMATE CUT BLUE 75MM

## (undated) DEVICE — DRAPE ABDOMINAL TIBURON 14X11

## (undated) DEVICE — ELECTRODE REM PLYHSV RETURN 9

## (undated) DEVICE — DRESSING ADH ISLAND 3.6 X 14

## (undated) DEVICE — DRAPE INCISE IOBAN 2 23X17IN

## (undated) DEVICE — BLADE 4 INCH EDGE UN-INS

## (undated) DEVICE — SEE MEDLINE ITEM 156902

## (undated) DEVICE — SEE MEDLINE ITEM 146417